# Patient Record
Sex: FEMALE | Race: WHITE | NOT HISPANIC OR LATINO | Employment: FULL TIME | URBAN - METROPOLITAN AREA
[De-identification: names, ages, dates, MRNs, and addresses within clinical notes are randomized per-mention and may not be internally consistent; named-entity substitution may affect disease eponyms.]

---

## 2016-10-27 LAB
ABO + RH BLD: NORMAL
ABO + RH BLD: NORMAL
BLD GP AB SCN SERPL QL: NEGATIVE
C TRACH DNA SPEC QL PROBE+SIG AMP: NEGATIVE
CULTURE MICRO: NO GROWTH
HBV SURFACE AG SERPL QL IA: NORMAL
HCT VFR BLD AUTO: 38.1 %
HEMOGLOBIN: 13 G/DL (ref 11.7–15.7)
HIV 1+2 AB+HIV1 P24 AG SERPL QL IA: NORMAL
N GONORRHOEA DNA SPEC QL PROBE+SIG AMP: NEGATIVE
PAP: NORMAL
PLATELET # BLD AUTO: 349 10^9/L
RUBELLA ANTIBODY IGG QUANTITATIVE: NORMAL IU/ML
T PALLIDUM IGG SER QL: NEGATIVE

## 2017-03-06 ENCOUNTER — PRENATAL OFFICE VISIT (OUTPATIENT)
Dept: OBGYN | Facility: CLINIC | Age: 27
End: 2017-03-06
Payer: COMMERCIAL

## 2017-03-06 ENCOUNTER — TELEPHONE (OUTPATIENT)
Dept: OBGYN | Facility: CLINIC | Age: 27
End: 2017-03-06

## 2017-03-06 VITALS
SYSTOLIC BLOOD PRESSURE: 116 MMHG | BODY MASS INDEX: 28.29 KG/M2 | HEIGHT: 69 IN | WEIGHT: 191 LBS | OXYGEN SATURATION: 98 % | HEART RATE: 100 BPM | DIASTOLIC BLOOD PRESSURE: 74 MMHG

## 2017-03-06 DIAGNOSIS — Z53.9 ERRONEOUS ENCOUNTER--DISREGARD: Primary | ICD-10-CM

## 2017-03-06 DIAGNOSIS — Z23 NEED FOR TDAP VACCINATION: ICD-10-CM

## 2017-03-06 DIAGNOSIS — Z34.90 SUPERVISION OF NORMAL PREGNANCY: Primary | ICD-10-CM

## 2017-03-06 LAB — GLUCOSE 1H P 50 G GLC PO SERPL-MCNC: 93 MG/DL (ref 60–129)

## 2017-03-06 PROCEDURE — 90471 IMMUNIZATION ADMIN: CPT | Performed by: OBSTETRICS & GYNECOLOGY

## 2017-03-06 PROCEDURE — 82950 GLUCOSE TEST: CPT | Performed by: OBSTETRICS & GYNECOLOGY

## 2017-03-06 PROCEDURE — 90715 TDAP VACCINE 7 YRS/> IM: CPT | Performed by: OBSTETRICS & GYNECOLOGY

## 2017-03-06 PROCEDURE — 99207 ZZC FIRST OB VISIT: CPT | Performed by: OBSTETRICS & GYNECOLOGY

## 2017-03-06 PROCEDURE — 36415 COLL VENOUS BLD VENIPUNCTURE: CPT | Performed by: OBSTETRICS & GYNECOLOGY

## 2017-03-06 RX ORDER — PRENATAL VIT/IRON FUM/FOLIC AC 27MG-0.8MG
1 TABLET ORAL DAILY
COMMUNITY
End: 2024-03-20

## 2017-03-06 NOTE — MR AVS SNAPSHOT
"              After Visit Summary   3/6/2017    Sophie Felton    MRN: [id number]           Patient Information     Date Of Birth          1990        Visit Information        Provider Department      3/6/2017 8:15 AM Gloria Wills MD [facility]        Today's Diagnoses     Supervision of normal pregnancy    -  1    Need for Tdap vaccination           Follow-ups after your visit        Who to contact     If you have questions or need follow up information about today's clinic visit or your schedule please contact [facility] directly at [phone].  Normal or non-critical lab and imaging results will be communicated to you by Wander [employer]hart, letter or phone within 4 business days after the clinic has received the results. If you do not hear from us within 7 days, please contact the clinic through Wander [employer]ongo[employer]hart or phone. If you have a critical or abnormal lab result, we will notify you by phone as soon as possible.  Submit refill requests through [employer] or call your pharmacy and they will forward the refill request to us. Please allow 3 business days for your refill to be completed.          Additional Information About Your Visit        MyChart Information     [employer] lets you send messages to your doctor, view your test results, renew your prescriptions, schedule appointments and more. To sign up, go to [email or web address] . Click on \"Log in\" on the left side of the screen, which will take you to the Welcome page. Then click on \"Sign up Now\" on the right side of the page.     You will be asked to enter the access code listed below, as well as some personal information. Please follow the directions to create your username and password.     Your access code is: [email or web address]  Expires: 2017  9:11 AM     Your access code will  in 90 days. If you need help or a new code, please call your [facility] or [phone].        Care EveryWhere ID     This is your Care EveryWhere " "ID. This could be used by other organizations to access your [place] medical records  [id number]        Your Vitals Were     Pulse Height Last Period Pulse Oximetry Breastfeeding? BMI (Body Mass Index)    100 5' 9\" (1.753 m) 08/24/2016 98% No 28.21 kg/m2       Blood Pressure from Last 3 Encounters:   03/06/17 116/74    Weight from Last 3 Encounters:   03/06/17 191 lb (86.6 kg)              We Performed the Following     ABO and Rh     Anti Treponema     CBC with platelets     Chlamydia trachomatis PCR     Conventional pap smear, diagnostic     Glucose tolerance gest screen 1 hour     Hepatitis B surface antigen     HIV Antigen Antibody Combo     INJECTION INTRAMUSCULAR OR SUB-Q     Neisseria gonorrhoeae PCR     OB hemoglobin     Rubella Antibody IgG Quantitative     TDAP (ADACEL AGES 11-64)     Urine Culture Aerobic Bacterial        Primary Care Provider    None Specified       No primary provider on file.        Thank you!     Thank you for choosing [facility]  for your care. Our goal is always to provide you with excellent care. Hearing back from our patients is one way we can continue to improve our services. Please take a few minutes to complete the written survey that you may receive in the mail after your visit with us. Thank you!             Your Updated Medication List - Protect others around you: Learn how to safely use, store and throw away your medicines at www.disposemymeds.org.          This list is accurate as of: 3/6/17  9:11 AM.  Always use your most recent med list.                   Brand Name Dispense Instructions for use    COLACE PO          prenatal multivitamin  plus iron 27-0.8 MG Tabs per tablet      Take 1 tablet by mouth daily         "

## 2017-03-06 NOTE — PROGRESS NOTES
SUBJECTIVE: Sophie Felton is an 27 year old female  here for initial OB visit with us.  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  Her pregnancy has been uncomplicated to date.  History of , SOL at 41 weeks, uncomplicated.  Then had SAB, periods resumed more widely spaced.  Pregnancy is dated by a 7w2d ultrasound which was 6 days discrepant with LMP NISHANT.  Had reassuring survey ultrasound.  Was seen at 24 weeks at [facility], had decreased fetal activity, baby has been active since then.  Considering delivery at [place], discussed, she will transfer to a  group that delivers there if this is her choice.  Also discussed CNM service here.  Tdap given today.        LMP was Patient's last menstrual period was 2016.  NISHANT is Estimated Date of Delivery: 2017, and she is approximately 26w6d  weeks today.     She reports no current significant problems.     EXAM: Blood pressure 116/74, pulse 100, weight 191 lb (86.6 kg), last menstrual period 2016, SpO2 98 %, not currently breastfeeding.  General appearance revealed a healthy, well-nourished woman in no distress.  Abdomen is soft, nontender without hernia or hepatosplenomegaly. There is no abnormal inguinal nor axillary adenopathy. Skin was normal.  Extremeties were without edema.  Neuro/psych exam revealed appropriate mood and affect.      PELVIC EXAM:  Deferred.     ASSESSMENT: Healthy , 26w6d.   PLAN:  1.  Follow up in 4 weeks  2.  Await one hour glucose.

## 2017-04-05 ENCOUNTER — PRENATAL OFFICE VISIT (OUTPATIENT)
Dept: OBGYN | Facility: CLINIC | Age: 27
End: 2017-04-05
Payer: COMMERCIAL

## 2017-04-05 VITALS
OXYGEN SATURATION: 95 % | WEIGHT: 193 LBS | BODY MASS INDEX: 28.5 KG/M2 | DIASTOLIC BLOOD PRESSURE: 67 MMHG | HEART RATE: 80 BPM | SYSTOLIC BLOOD PRESSURE: 104 MMHG

## 2017-04-05 DIAGNOSIS — Z34.80 SUPERVISION OF OTHER NORMAL PREGNANCY, ANTEPARTUM: Primary | ICD-10-CM

## 2017-04-05 PROCEDURE — 99207 ZZC PRENATAL VISIT: CPT | Performed by: OBSTETRICS & GYNECOLOGY

## 2017-04-05 NOTE — MR AVS SNAPSHOT
"              After Visit Summary   4/5/2017    Sophie Felton    MRN: [id number]           Patient Information     Date Of Birth          1990        Visit Information        Provider Department      4/5/2017 9:00 AM Gloria Wills MD [facility]        Today's Diagnoses     Supervision of other normal pregnancy, antepartum    -  1       Follow-ups after your visit        Your next 10 appointments already scheduled     Apr 18, 2017 12:45 PM CDT   ESTABLISHED PRENATAL with CHRISTOPHER Maldonado CNZORA   [facility] ([facility])    [address] [place] [address] [phone]              Who to contact     If you have questions or need follow up information about today's clinic visit or your schedule please contact [facility] directly at [phone].  Normal or non-critical lab and imaging results will be communicated to you by MyChart, letter or phone within 4 business days after the clinic has received the results. If you do not hear from us within 7 days, please contact the clinic through MyChart or phone. If you have a critical or abnormal lab result, we will notify you by phone as soon as possible.  Submit refill requests through Druidly or call your pharmacy and they will forward the refill request to us. Please allow 3 business days for your refill to be completed.          Additional Information About Your Visit        MyChart Information     Druidly lets you send messages to your doctor, view your test results, renew your prescriptions, schedule appointments and more. To sign up, go to [email or web address] . Click on \"Log in\" on the left side of the screen, which will take you to the Welcome page. Then click on \"Sign up Now\" on the right side of the page.     You will be asked to enter the access code listed below, as well as some personal information. Please follow the directions to create your " username and password.     Your access code is: [email or web address]  Expires: 2017 10:11 AM     Your access code will  in 90 days. If you need help or a new code, please call your [facility] or [phone].        Care EveryWhere ID     This is your Care EveryWhere ID. This could be used by other organizations to access your [place] medical records  [id number]        Your Vitals Were     Pulse Last Period Pulse Oximetry Breastfeeding? BMI (Body Mass Index)       80 2016 95% No 28.5 kg/m2        Blood Pressure from Last 3 Encounters:   17 104/67   17 116/74    Weight from Last 3 Encounters:   17 193 lb (87.5 kg)   17 191 lb (86.6 kg)              Today, you had the following     No orders found for display       Primary Care Provider    None Specified       No primary provider on file.        Thank you!     Thank you for choosing [facility]  for your care. Our goal is always to provide you with excellent care. Hearing back from our patients is one way we can continue to improve our services. Please take a few minutes to complete the written survey that you may receive in the mail after your visit with us. Thank you!             Your Updated Medication List - Protect others around you: Learn how to safely use, store and throw away your medicines at www.disposemymeds.org.          This list is accurate as of: 17  9:21 AM.  Always use your most recent med list.                   Brand Name Dispense Instructions for use    COLACE PO          prenatal multivitamin  plus iron 27-0.8 MG Tabs per tablet      Take 1 tablet by mouth daily

## 2017-04-05 NOTE — PROGRESS NOTES
Back pain, right sciatic pain--discussed, she will call if she wishes PT referral.  One hour glucose normal.  Probably will make next appt with CNM service, plans to stay with delivery at [place].  RTC 2 weeks.  AM

## 2017-04-18 ENCOUNTER — PRENATAL OFFICE VISIT (OUTPATIENT)
Dept: MIDWIFE SERVICES | Facility: CLINIC | Age: 27
End: 2017-04-18
Payer: COMMERCIAL

## 2017-04-18 VITALS
HEART RATE: 89 BPM | OXYGEN SATURATION: 96 % | WEIGHT: 198 LBS | SYSTOLIC BLOOD PRESSURE: 121 MMHG | DIASTOLIC BLOOD PRESSURE: 79 MMHG | BODY MASS INDEX: 29.24 KG/M2

## 2017-04-18 DIAGNOSIS — Z34.83 OTHER NORMAL PREGNANCY, NOT FIRST, THIRD TRIMESTER: Primary | ICD-10-CM

## 2017-04-18 PROCEDURE — 99207 ZZC PRENATAL VISIT: CPT | Performed by: ADVANCED PRACTICE MIDWIFE

## 2017-04-18 NOTE — MR AVS SNAPSHOT
"              After Visit Summary   4/18/2017    Sophie Felton    MRN: [id number]           Patient Information     Date Of Birth          1990        Visit Information        Provider Department      4/18/2017 12:45 PM [facility]        Today's Diagnoses     Other normal pregnancy, not first, third trimester    -  1       Follow-ups after your visit        Your next 10 appointments already scheduled     May 04, 2017  9:30 AM CDT   ESTABLISHED PRENATAL with Lizette Garrett CNM   [facility] ([facility]    [address] [place] [address] [phone]              Who to contact     If you have questions or need follow up information about today's clinic visit or your schedule please contact [facility] directly at [phone].  Normal or non-critical lab and imaging results will be communicated to you by MyChart, letter or phone within 4 business days after the clinic has received the results. If you do not hear from us within 7 days, please contact the clinic through MyChart or phone. If you have a critical or abnormal lab result, we will notify you by phone as soon as possible.  Submit refill requests through [employer] or call your pharmacy and they will forward the refill request to us. Please allow 3 business days for your refill to be completed.          Additional Information About Your Visit        MyChart Information     [employer] lets you send messages to your doctor, view your test results, renew your prescriptions, schedule appointments and more. To sign up, go to [email or web address] . Click on \"Log in\" on the left side of the screen, which will take you to the Welcome page. Then click on \"Sign up Now\" on the right side of the page.     You will be asked to enter the access code listed below, as well as some personal information. Please follow the directions to " create your username and password.     Your access code is: [email or web address]  Expires: 2017 10:11 AM     Your access code will  in 90 days. If you need help or a new code, please call your [facility] or [phone].        Care EveryWhere ID     This is your Care EveryWhere ID. This could be used by other organizations to access your [place] medical records  [id number]        Your Vitals Were     Pulse Last Period Pulse Oximetry Breastfeeding? BMI (Body Mass Index)       89 2016 96% No 29.24 kg/m2        Blood Pressure from Last 3 Encounters:   17 121/79   17 104/67   17 116/74    Weight from Last 3 Encounters:   17 198 lb (89.8 kg)   17 193 lb (87.5 kg)   17 191 lb (86.6 kg)              Today, you had the following     No orders found for display       Primary Care Provider    None Specified       No primary provider on file.        Thank you!     Thank you for choosing [facility]  for your care. Our goal is always to provide you with excellent care. Hearing back from our patients is one way we can continue to improve our services. Please take a few minutes to complete the written survey that you may receive in the mail after your visit with us. Thank you!             Your Updated Medication List - Protect others around you: Learn how to safely use, store and throw away your medicines at www.disposemymeds.org.          This list is accurate as of: 17  2:02 PM.  Always use your most recent med list.                   Brand Name Dispense Instructions for use    COLACE PO          prenatal multivitamin  plus iron 27-0.8 MG Tabs per tablet      Take 1 tablet by mouth daily

## 2017-04-18 NOTE — PROGRESS NOTES
33w0d  INO to [facility] care. Had unmedicated first birth at [place]. Had a . This time she is planning to not have a  but wants the support of midwifery and also the possibility of a waterbirth if things go well. She is uncertain if she wants to have another unmedicated birth and may opt for nitrous or fentanyl this time. She works as a nurse in [employer]. Reports good fetal movement. Denies leaking of fluid, vaginal bleeding, regular uterine contractions, headache or other concerns.    RTC in 2-3 wks . Discussed gbs, hgb, and hep c. EVELYNE

## 2017-05-04 ENCOUNTER — PRENATAL OFFICE VISIT (OUTPATIENT)
Dept: MIDWIFE SERVICES | Facility: CLINIC | Age: 27
End: 2017-05-04
Payer: COMMERCIAL

## 2017-05-04 VITALS
WEIGHT: 200.6 LBS | SYSTOLIC BLOOD PRESSURE: 116 MMHG | TEMPERATURE: 98 F | BODY MASS INDEX: 29.62 KG/M2 | HEART RATE: 98 BPM | OXYGEN SATURATION: 98 % | DIASTOLIC BLOOD PRESSURE: 68 MMHG

## 2017-05-04 DIAGNOSIS — Z34.80 SUPERVISION OF OTHER NORMAL PREGNANCY, ANTEPARTUM: Primary | ICD-10-CM

## 2017-05-04 PROCEDURE — 99207 ZZC PRENATAL VISIT: CPT | Performed by: ADVANCED PRACTICE MIDWIFE

## 2017-05-04 NOTE — PROGRESS NOTES
Feeling well.  Baby is active. Denies any leaking of fluid, vaginal bleeding, regular uterine contractions, or headaches or other concerns.  Discussed labs, including hep C, at 36 weeks, then weekly visits.  She wanted to take waterbirth consent home to review.  Needs to sign.  Reviewed to call [phone] for contractions, loss of fluid, vaginal bleeding, decreased fetal movement or any other questions or concerns.    RTC in 1 weeks.  Lizette Garrett, ESTEFANIA, APRN, CNM

## 2017-05-04 NOTE — MR AVS SNAPSHOT
After Visit Summary   5/4/2017    Sophie Felton    MRN: [id number]           Patient Information     Date Of Birth          1990        Visit Information        Provider Department      5/4/2017 9:30 AM Lizette Garrett CNM [facility]        Today's Diagnoses     Supervision of other normal pregnancy, antepartum    -  1       Follow-ups after your visit        Follow-up notes from your care team     Return in about 1 week (around 5/11/2017) for Prenatal care with ERICKA.      Your next 10 appointments already scheduled     May 10, 2017 10:00 AM CDT   ESTABLISHED PRENATAL with CHRISTOPHER Sweet CNM   [facility] ([facility])    [address] [place] [address] [address]  [facility] [id number] [phone]            May 16, 2017  9:30 AM CDT   ESTABLISHED PRENATAL with CHRISTOPHER Cummings CNM   [facility] ([facility])    [address] [place] [address]  [address]  [facility] [id number] [phone]            May 24, 2017 10:15 AM CDT   ESTABLISHED PRENATAL with CHRISTOPHER Monte CNM   [facility] ([facility]    [address] [place] [address]  [address]  [facility] [id number] [phone]            May 30, 2017  9:30 AM CDT   ESTABLISHED PRENATAL with CHRISTOPHER Kothari CNM   [facility] ([facility]    [address] [place] [address]  [address]  [facility] [address] [phone]            Jun 06, 2017  9:00 AM CDT   ESTABLISHED PRENATAL with CHRISTOPHER Stein CNM   [facility] ([facility])    [address] [place] [address]  [address]  [facility] [address] [phone]              Who to contact     If you have questions or need follow up information about today's clinic visit or your schedule please contact [facility] directly at [phone].  Normal or non-critical lab and imaging results  will be communicated to you by MyChart, letter or phone within 4 business days after the clinic has received the results. If you do not hear from us within 7 days, please contact the clinic through MyChart or phone. If you have a critical or abnormal lab result, we will notify you by phone as soon as possible.  Submit refill requests through Zak[employer] or call your pharmacy and they will forward the refill request to us. Please allow 3 business days for your refill to be completed.          Additional Information About Your Visit        Care EveryWhere ID     This is your Care EveryWhere ID. This could be used by other organizations to access your [place] medical records  [id number]        Your Vitals Were     Pulse Temperature Last Period Pulse Oximetry BMI (Body Mass Index)       98 98  F (36.7  C) (Oral) 08/24/2016 98% 29.62 kg/m2        Blood Pressure from Last 3 Encounters:   05/04/17 116/68   04/18/17 121/79   04/05/17 104/67    Weight from Last 3 Encounters:   05/04/17 200 lb 9.6 oz (91 kg)   04/18/17 198 lb (89.8 kg)   04/05/17 193 lb (87.5 kg)              Today, you had the following     No orders found for display       Primary Care Provider    None Specified       No primary provider on file.        Thank you!     Thank you for choosing [facility]  for your care. Our goal is always to provide you with excellent care. Hearing back from our patients is one way we can continue to improve our services. Please take a few minutes to complete the written survey that you may receive in the mail after your visit with us. Thank you!             Your Updated Medication List - Protect others around you: Learn how to safely use, store and throw away your medicines at www.disposemymeds.org.          This list is accurate as of: 5/4/17 10:23 AM.  Always use your most recent med list.                   Brand Name Dispense Instructions for use    COLACE PO          prenatal multivitamin  plus iron 27-0.8  MG Tabs per tablet      Take 1 tablet by mouth daily

## 2017-05-10 ENCOUNTER — PRENATAL OFFICE VISIT (OUTPATIENT)
Dept: MIDWIFE SERVICES | Facility: CLINIC | Age: 27
End: 2017-05-10
Payer: COMMERCIAL

## 2017-05-10 VITALS
SYSTOLIC BLOOD PRESSURE: 109 MMHG | WEIGHT: 200.3 LBS | DIASTOLIC BLOOD PRESSURE: 73 MMHG | HEART RATE: 87 BPM | TEMPERATURE: 97.7 F | OXYGEN SATURATION: 98 % | BODY MASS INDEX: 29.58 KG/M2

## 2017-05-10 DIAGNOSIS — Z34.80 SUPERVISION OF OTHER NORMAL PREGNANCY, ANTEPARTUM: Primary | ICD-10-CM

## 2017-05-10 LAB — HGB BLD-MCNC: 12.6 G/DL (ref 11.7–15.7)

## 2017-05-10 PROCEDURE — 36415 COLL VENOUS BLD VENIPUNCTURE: CPT | Performed by: ADVANCED PRACTICE MIDWIFE

## 2017-05-10 PROCEDURE — 00000218 ZZHCL STATISTIC OBHBG - HEMOGLOBIN: Performed by: ADVANCED PRACTICE MIDWIFE

## 2017-05-10 PROCEDURE — 99207 ZZC PRENATAL VISIT: CPT | Performed by: ADVANCED PRACTICE MIDWIFE

## 2017-05-10 PROCEDURE — 86803 HEPATITIS C AB TEST: CPT | Performed by: ADVANCED PRACTICE MIDWIFE

## 2017-05-10 PROCEDURE — 87653 STREP B DNA AMP PROBE: CPT | Performed by: ADVANCED PRACTICE MIDWIFE

## 2017-05-10 NOTE — MR AVS SNAPSHOT
After Visit Summary   5/10/2017    Sophie Felton    MRN: [id number]           Patient Information     Date Of Birth          1990        Visit Information        Provider Department      5/10/2017 10:00 AM Romana Lance APRN CNM [facility]        Today's Diagnoses     Supervision of other normal pregnancy, antepartum    -  1       Follow-ups after your visit        Your next 10 appointments already scheduled     May 16, 2017  9:30 AM CDT   ESTABLISHED PRENATAL with CHRISTOPHER Cummings CNM   [facility]    [address] [place] [id number]   [phone]            May 24, 2017 10:15 AM CDT   ESTABLISHED PRENATAL with CHRISTOPHER Monte CNM   [facility] ([facility])    [address] [place] [address] [phone]            May 30, 2017  9:30 AM CDT   ESTABLISHED PRENATAL with CHRISTOPHER Kothari CNM   [facility] ([facility]    [address] [place] [address] [phone]            Jun 06, 2017  9:00 AM CDT   ESTABLISHED PRENATAL with CHRISTOPHER Stein CNM   [facility] ([facility]    [address] [place] [id number] [phone]              Who to contact     If you have questions or need follow up information about today's clinic visit or your schedule please contact [facility] directly at [phone].  Normal or non-critical lab and imaging results will be communicated to you by MyChart, letter or phone within 4 business days after the clinic has received the results. If you do not hear from us within 7 days, please contact the clinic through MyChart or phone. If you have a critical or abnormal lab result, we will notify you by phone as soon as possible.  Submit refill requests  through [employer] or call your pharmacy and they will forward the refill request to us. Please allow 3 business days for your refill to be completed.          Additional Information About Your Visit        Care EveryWhere ID     This is your Care EveryWhere ID. This could be used by other organizations to access your [place] medical records  [id number]        Your Vitals Were     Pulse Temperature Last Period Pulse Oximetry BMI (Body Mass Index)       87 97.7  F (36.5  C) (Oral) 08/24/2016 98% 29.58 kg/m2        Blood Pressure from Last 3 Encounters:   05/10/17 109/73   05/04/17 116/68   04/18/17 121/79    Weight from Last 3 Encounters:   05/10/17 200 lb 4.8 oz (90.9 kg)   05/04/17 200 lb 9.6 oz (91 kg)   04/18/17 198 lb (89.8 kg)              We Performed the Following     Group B strep PCR     Hepatitis C antibody     OB hemoglobin        Primary Care Provider    None Specified       No primary provider on file.        Thank you!     Thank you for choosing [facility]  for your care. Our goal is always to provide you with excellent care. Hearing back from our patients is one way we can continue to improve our services. Please take a few minutes to complete the written survey that you may receive in the mail after your visit with us. Thank you!             Your Updated Medication List - Protect others around you: Learn how to safely use, store and throw away your medicines at www.disposemymeds.org.          This list is accurate as of: 5/10/17 10:31 AM.  Always use your most recent med list.                   Brand Name Dispense Instructions for use    COLACE PO          prenatal multivitamin  plus iron 27-0.8 MG Tabs per tablet      Take 1 tablet by mouth daily

## 2017-05-11 LAB
GP B STREP DNA SPEC QL NAA+PROBE: NORMAL
HCV AB SERPL QL IA: NORMAL
SPECIMEN SOURCE: NORMAL

## 2017-05-11 ASSESSMENT — PATIENT HEALTH QUESTIONNAIRE - PHQ9: SUM OF ALL RESPONSES TO PHQ QUESTIONS 1-9: 0

## 2017-05-16 ENCOUNTER — PRENATAL OFFICE VISIT (OUTPATIENT)
Dept: MIDWIFE SERVICES | Facility: CLINIC | Age: 27
End: 2017-05-16
Payer: COMMERCIAL

## 2017-05-16 VITALS
SYSTOLIC BLOOD PRESSURE: 103 MMHG | OXYGEN SATURATION: 97 % | WEIGHT: 202 LBS | HEART RATE: 100 BPM | BODY MASS INDEX: 29.83 KG/M2 | DIASTOLIC BLOOD PRESSURE: 72 MMHG

## 2017-05-16 DIAGNOSIS — O26.893 HEARTBURN DURING PREGNANCY IN THIRD TRIMESTER: ICD-10-CM

## 2017-05-16 DIAGNOSIS — Z34.80 SUPERVISION OF OTHER NORMAL PREGNANCY, ANTEPARTUM: Primary | ICD-10-CM

## 2017-05-16 DIAGNOSIS — R12 HEARTBURN DURING PREGNANCY IN THIRD TRIMESTER: ICD-10-CM

## 2017-05-16 PROCEDURE — 99207 ZZC PRENATAL VISIT: CPT | Performed by: NURSE PRACTITIONER

## 2017-05-16 NOTE — PATIENT INSTRUCTIONS
HEARTBURN PREVENTION    Heartburn can be incredibly uncomfortable and bothersome. It can worsen with pregnancy, certain medications, and other illnesses. Try the lifestyle tips and remedies to combat your symptoms. We encourage you to try these first before using medications.      Small frequent meals      Eat slowly       Separate eating solid foods from liquids by 30-60 minutes      Do not eat within 2 hour of going to sleep      Avoid going to sleep right after eating or prop yourself up on pillows to avoid being completely flat      Avoid acidic foods like tomatoes, spicy foods, caffeine, chocolate, peppermint, alcohol, sugar, white bread rice, and pasta (whole grains are easier to digest)      Chew gum or have a throat lozenge before eating to encourage salivation, saliva neutralizes stomach acid       Avoid tobacco and alcohol      Weight loss may also be helpful if you are overweight      NATURAL REMEDIES FOR HEARTBURN  You should avoid overuse of standard over the counter (OTC) antacids since they neutralize hydrochloric acid and arrest digestion.  Neutralized stomach acids later rebound with an increased acid secretion after the antacid wears off, which in turn produces even more stomach acid.  Prolonged use of antacids can lead to diarrhea and constipation with nausea.  Additionally, brucellae, a beneficial bacteria found in some dairy products, is killed by antacids.  It is better to try these natural remedies first.       Papaya tablets, papaya or papaya leaf tea      Fennel seeds, cumin seeds, anise seeds, and dill seed, can be eaten alone or made into tea      Essential oils to massage into the upper abdomen chamomile, fennel, lemon balm, nini sandalwood                                                      Sip a small amount of apple cider vinegar with warm water      Raw almonds      Drink a glass of milk before bed      Supplement with digestive enzymes (can be found in most drug/health food)  "stores    Plain baked potatoes    Comfrey-pepsin tablets (digestive aid)    Eat raw apple peels  Medications    If natural remedies and lifestyle changes fail consider using OTC medications. Many OTC medications are available for heartburn and are often very effective.      Antacids like Tums, Mylanta, Maalox     Zantac/Pepcid    Prilosec     Most OTC medications are recommended for short term use only, if you continue to have issues despite all of these helpful methods we recommend you be seen by a primary care provider for evaluation.    For questions or concerns please call:    Labor Instructions for Midwife Patients    When to call:  Both during and after office hours call [phone]. There is a triage RN to take your calls and answer your questions 24 hours a day.  If she cannot answer your question she will page the midwife on call for you.    When to call:  Call anytime you have important concerns about you or your baby.     Call if:    You are having contractions at regular intervals about 5-6 minutes apart lasting 30-60 seconds and becoming increasingly more intense     You have an uncontrollable gush of fluid from your vagina or feel a pop and gush like your water has broken    You have HEAVY bleeding, like heavy period, blood running down your legs, or  soaking a pad.     Some bleeding after a pelvic exam, after intercourse, or in labor when your cervix is dilating is normal and is referred to as \"bloody show\"    You have severe, continuous back or abdominal pain    You feel it is time to go to the hospital    If this is your first labor, call when contractions are very intense and have been about every 3-4 minutes for about an hour    If it is your second labor or more, call when contractions are strong and about every 3-5 minutes or sooner depending on your level of discomfort.     Keep in mind we are always here for you! If you have questions, concerns please don't hesitate to call us.     What to " "eat/drink in labor: Drink plenty of fluid (water most importantly, juice, soda or tea without caffeine). Eat rice, pasta, soup, cereal, bread/toast, and fruit. Avoid dairy and greasy food as they are difficult to digest and you may experience some nausea during labor.    Comfort measures:    Baths and showers (ok even with ruptured membranes, it may temporarily slow contractions if you are still in the early stage of labor)    Warm/hot packs for back pain or discomfort    Back, belly, or thigh massages    Standing, rocking, walking, leaning over bed or tables, side-lying and sleeping    Miscellaneous:     Contractions are timed from the beginning of one to the beginning of the next    Try hard to sleep during the early stage of labor when you are not that uncomfortable. Timing of contractions at this point is not important    Even if you cannot sleep, resting in bed or on the couch can help you maintain your energy for labor    When you arrive at the hospital the nurse will check your baby's heartbeat, check your cervix, and will call us. The midwife on call will come in and be with you when you are in active labor    After hours you need to enter the hospital through the emergency room    PREECLAMPSIA SIGNS AND SYMPTOMS    Preeclampsia is a dangerous condition that some women develop in the second half of pregnancy. It can also begin after the baby is born.  Preeclampsia causes high blood pressure and can cause problems with many organ systems in your body.  It can also affect the growth of your baby. The exact cause of preeclampsia is unknown, however, there are signs and symptoms to watch for:    -A bad headache that doesn't improve with Tylenol  -Visual changes such as spots, flashes of light, blurry vision  -Pain in the upper right part of your abdomen, especially under the ribs that doesn't go away  -Nausea and/or vomiting  -Feeling extremely tired  -Yellowing of the skin and/or eyes  -Feeling \"not quite " "right\" or that something is wrong  -An extreme amount of swelling (some swelling in pregnancy is very normal)    If your midwife feels that you are developing preeclampsia, you will have lab tests drawn and will be monitored very closely.     If you are experiencing anyof these symptoms, call  "

## 2017-05-16 NOTE — PROGRESS NOTES
States that she has been having daily heartburn; states that she is getting some relief with Tums.    Fetal movement: positive  Denies vaginal bleeding/lof, some contractions that resolve on own with rest  Warning signs reviewed  Labor signs and symptoms discussed, aware of numbers to call  Denies s/sx of preeclampsia and aware of what to report  Counseled on using Zantac OTC 150mg 1-2x day for heartburn  Plans for birth control after baby: barrier methods  Return to clinic 1 week OB visit    Comfort WHITE CNM

## 2017-05-16 NOTE — MR AVS SNAPSHOT
After Visit Summary   5/16/2017    Sophie Felton    MRN: [id number]           Patient Information     Date Of Birth          1990        Visit Information        Provider Department      5/16/2017 9:30 AM [facility]        Today's Diagnoses     Supervision of other normal pregnancy, antepartum    -  1    Heartburn during pregnancy in third trimester          Care Instructions         HEARTBURN PREVENTION    Heartburn can be incredibly uncomfortable and bothersome. It can worsen with pregnancy, certain medications, and other illnesses. Try the lifestyle tips and remedies to combat your symptoms. We encourage you to try these first before using medications.      Small frequent meals      Eat slowly       Separate eating solid foods from liquids by 30-60 minutes      Do not eat within 2 hour of going to sleep      Avoid going to sleep right after eating or prop yourself up on pillows to avoid being completely flat      Avoid acidic foods like tomatoes, spicy foods, caffeine, chocolate, peppermint, alcohol, sugar, white bread rice, and pasta (whole grains are easier to digest)      Chew gum or have a throat lozenge before eating to encourage salivation, saliva neutralizes stomach acid       Avoid tobacco and alcohol      Weight loss may also be helpful if you are overweight      NATURAL REMEDIES FOR HEARTBURN  You should avoid overuse of standard over the counter (OTC) antacids since they neutralize hydrochloric acid and arrest digestion.  Neutralized stomach acids later rebound with an increased acid secretion after the antacid wears off, which in turn produces even more stomach acid.  Prolonged use of antacids can lead to diarrhea and constipation with nausea.  Additionally, brucellae, a beneficial bacteria found in some dairy products, is killed by antacids.  It is better to try these natural remedies first.       Papaya tablets, papaya or papaya  "leaf tea      Fennel seeds, cumin seeds, anise seeds, and dill seed, can be eaten alone or made into tea      Essential oils to massage into the upper abdomen chamomile, fennel, lemon balm, nini sandalwood                                                      Sip a small amount of apple cider vinegar with warm water      Raw almonds      Drink a glass of milk before bed      Supplement with digestive enzymes (can be found in most drug/health food) stores    Plain baked potatoes    Comfrey-pepsin tablets (digestive aid)    Eat raw apple peels  Medications    If natural remedies and lifestyle changes fail consider using OTC medications. Many OTC medications are available for heartburn and are often very effective.      Antacids like Tums, Mylanta, Maalox     Zantac/Pepcid    Prilosec     Most OTC medications are recommended for short term use only, if you continue to have issues despite all of these helpful methods we recommend you be seen by a primary care provider for evaluation.    For questions or concerns please call:    Labor Instructions for Midwife Patients    When to call:  Both during and after office hours call [phone]. There is a triage RN to take your calls and answer your questions 24 hours a day.  If she cannot answer your question she will page the midwife on call for you.    When to call:  Call anytime you have important concerns about you or your baby.     Call if:    You are having contractions at regular intervals about 5-6 minutes apart lasting 30-60 seconds and becoming increasingly more intense     You have an uncontrollable gush of fluid from your vagina or feel a pop and gush like your water has broken    You have HEAVY bleeding, like heavy period, blood running down your legs, or  soaking a pad.     Some bleeding after a pelvic exam, after intercourse, or in labor when your cervix is dilating is normal and is referred to as \"bloody show\"    You have severe, continuous back or abdominal " pain    You feel it is time to go to the hospital    If this is your first labor, call when contractions are very intense and have been about every 3-4 minutes for about an hour    If it is your second labor or more, call when contractions are strong and about every 3-5 minutes or sooner depending on your level of discomfort.     Keep in mind we are always here for you! If you have questions, concerns please don't hesitate to call us.     What to eat/drink in labor: Drink plenty of fluid (water most importantly, juice, soda or tea without caffeine). Eat rice, pasta, soup, cereal, bread/toast, and fruit. Avoid dairy and greasy food as they are difficult to digest and you may experience some nausea during labor.    Comfort measures:    Baths and showers (ok even with ruptured membranes, it may temporarily slow contractions if you are still in the early stage of labor)    Warm/hot packs for back pain or discomfort    Back, belly, or thigh massages    Standing, rocking, walking, leaning over bed or tables, side-lying and sleeping    Miscellaneous:     Contractions are timed from the beginning of one to the beginning of the next    Try hard to sleep during the early stage of labor when you are not that uncomfortable. Timing of contractions at this point is not important    Even if you cannot sleep, resting in bed or on the couch can help you maintain your energy for labor    When you arrive at the hospital the nurse will check your baby's heartbeat, check your cervix, and will call us. The midwife on call will come in and be with you when you are in active labor    After hours you need to enter the hospital through the emergency room    PREECLAMPSIA SIGNS AND SYMPTOMS    Preeclampsia is a dangerous condition that some women develop in the second half of pregnancy. It can also begin after the baby is born.  Preeclampsia causes high blood pressure and can cause problems with many organ systems in your body.  It can also  "affect the growth of your baby. The exact cause of preeclampsia is unknown, however, there are signs and symptoms to watch for:    -A bad headache that doesn't improve with Tylenol  -Visual changes such as spots, flashes of light, blurry vision  -Pain in the upper right part of your abdomen, especially under the ribs that doesn't go away  -Nausea and/or vomiting  -Feeling extremely tired  -Yellowing of the skin and/or eyes  -Feeling \"not quite right\" or that something is wrong  -An extreme amount of swelling (some swelling in pregnancy is very normal)    If your midwife feels that you are developing preeclampsia, you will have lab tests drawn and will be monitored very closely.     If you are experiencing anyof these symptoms, call        Follow-ups after your visit        Follow-up notes from your care team     Return in about 1 week (around 5/23/2017) for Prenatal visit.      Your next 10 appointments already scheduled     May 24, 2017 10:15 AM CDT   [place] CHRISTOPHER Monte [place]   [facility] [place]    [address] [place] [id number] [phone]            May 30, 2017  9:30 AM CDT   ESTABLISHED PRENATAL with CHRISTOPHER Kothari CNM   [facility] [place]    [address] [place] [id number] [phone]            Jun 06, 2017  9:00 AM CDT   ESTABLISHED PRENATAL with CHRISTOPHER Stein CNM   [facility] [place]    [address] [place] [address] [id number] [phone]              Who to contact     If you have questions or need follow up information about today's clinic visit or your schedule please [place] [facility] at [phone].  Normal or non-critical lab and imaging results will be communicated to you by MyChart, letter or phone within 4 business days after the clinic " "has received the results. If you do not hear from us within 7 days, please contact the clinic through [employer] or phone. If you have a critical or abnormal lab result, we will notify you by phone as soon as possible.  Submit refill requests through [employer] or call your pharmacy and they will forward the refill request to us. Please allow 3 business days for your refill to be completed.          Additional Information About Your Visit        [employer] Information     [employer] lets you send messages to your doctor, view your test results, renew your prescriptions, schedule appointments and more. To sign up, go to [email or web address] . Click on \"Log in\" on the left side of the screen, which will take you to the Welcome page. Then click on \"Sign up Now\" on the right side of the page.     You will be asked to enter the access code listed below, as well as some personal information. Please follow the directions to create your username and password.     Your access code is: [email or web address]  Expires: 2017  9:30 AM     Your access code will  in 90 days. If you need help or a new code, please call your [facility] clinic or [phone].        Care EveryWhere ID     This is your Care EveryWhere ID. This could be used by other organizations to access your [facility] medical records  [id number]        Your Vitals Were     Pulse Last Period Pulse Oximetry Breastfeeding? BMI (Body Mass Index)       100 2016 97% No 29.83 kg/m2        Blood Pressure from Last 3 Encounters:   17 103/72   05/10/17 109/73   17 116/68    Weight from Last 3 Encounters:   17 202 lb (91.6 kg)   05/10/17 200 lb 4.8 oz (90.9 kg)   17 200 lb 9.6 oz (91 kg)              Today, you had the following     No orders found for display         Today's Medication Changes          These changes are accurate as of: 17 11:34 AM.  If you have any questions, ask your nurse or doctor.               Start taking these medicines.     "    Dose/Directions    ranitidine 150 MG tablet   Commonly known as:  ZANTAC   Used for:  Heartburn during pregnancy in third trimester   Started by:  Comfort Diaz APRN CNM        Dose:  150 mg   Take 1 tablet (150 mg) by mouth 2 times daily   Quantity:  60 tablet   Refills:  1            Where to get your medicines      Some of these will need a paper prescription and others can be bought over the counter.  Ask your nurse if you have questions.     You don't need a prescription for these medications     ranitidine 150 MG tablet                Primary Care Provider    None Specified       No primary provider on file.        Thank you!     [place] you [place] [facility]  [place]. Our goal is always to provide you with excellent care. Hearing back from our patients is one way we can continue to improve our services. Please take a few minutes to complete the written survey that you may receive in the mail after your visit with us. Thank you!             Your Updated Medication List - Protect others around you: Learn how to safely use, store and throw away your medicines at www.disposemymeds.org.          This list is accurate as of: 5/16/17 11:34 AM.  Always use your most recent med list.                   Brand Name Dispense Instructions for use    COLACE PO          prenatal multivitamin  plus iron 27-0.8 MG Tabs per tablet      Take 1 tablet by mouth daily       ranitidine 150 MG tablet    ZANTAC    60 tablet    Take 1 tablet (150 mg) by mouth 2 times daily

## 2017-05-24 ENCOUNTER — PRENATAL OFFICE VISIT (OUTPATIENT)
Dept: MIDWIFE SERVICES | Facility: CLINIC | Age: 27
End: 2017-05-24
Payer: COMMERCIAL

## 2017-05-24 VITALS — DIASTOLIC BLOOD PRESSURE: 70 MMHG | WEIGHT: 205.3 LBS | SYSTOLIC BLOOD PRESSURE: 124 MMHG | BODY MASS INDEX: 30.32 KG/M2

## 2017-05-24 DIAGNOSIS — Z34.80 SUPERVISION OF OTHER NORMAL PREGNANCY, ANTEPARTUM: Primary | ICD-10-CM

## 2017-05-24 PROCEDURE — 99207 ZZC PRENATAL VISIT: CPT | Performed by: ADVANCED PRACTICE MIDWIFE

## 2017-05-24 NOTE — MR AVS SNAPSHOT
"              After Visit Summary   5/24/2017    Sophie Felton    MRN: [id number]           Patient Information     Date Of Birth          1990        Visit Information        Provider Department      5/24/2017 10:15 AM Augusta Kim APRN CN[facility]        Today's Diagnoses     Supervision of other normal pregnancy, antepartum    -  1       Follow-ups after your visit        Your next 10 appointments already scheduled     Jun 06, 2017 11:00 AM CDT   ESTABLISHED PRENATAL with CHRISTOPHER Sweet CNM   [facility] [place]    [address] [place] [id number] [phone]              Who to contact     If you have questions or need follow up information about today's clinic visit or your schedule please contact [facility] [place] at [phone].  Normal or non-critical lab and imaging results will be communicated to you by MyChart, letter or phone within 4 business days after the clinic has received the results. If you do not hear from us within 7 days, please contact the clinic through MyChart or phone. If you have a critical or abnormal lab result, we will notify you by phone as soon as possible.  Submit refill requests through [employer] or call your pharmacy and they will forward the refill request to us. Please allow 3 business days for your refill to be completed.          Additional Information About Your Visit        MyChart Information     [employer] lets you send messages to your doctor, view your test results, renew your prescriptions, schedule appointments and more. To sign up, go to [email or web address] . Click on \"Log in\" on the left side of the screen, which will take you to the Welcome page. Then click on \"Sign up Now\" on the right side of the page.     You will be asked to enter the access code listed below, as well as some personal information. Please follow the directions to create " your username and password.     Your access code is: [email or web address]  Expires: 2017  9:30 AM     Your access code will  in 90 days. If you need help or a new code, please call your [facility] or [phone].        Care EveryWhere ID     This is your Care EveryWhere ID. This could be used by other organizations to access your [place] medical records  [id number]        Your Vitals Were     Last Period BMI (Body Mass Index)                2016 30.32 kg/m2           Blood Pressure from Last 3 Encounters:   17 124/70   17 103/72   05/10/17 109/73    Weight from Last 3 Encounters:   17 205 lb 4.8 oz (93.1 kg)   17 202 lb (91.6 kg)   05/10/17 200 lb 4.8 oz (90.9 kg)              Today, you had the following     No orders found for display       Primary Care Provider    None Specified       No primary provider on file.        Thank you!     Thank you for choosing [facility]  for your care. Our goal is always to provide you with excellent care. Hearing back from our patients is one way we can continue to improve our services. Please take a few minutes to complete the written survey that you may receive in the mail after your visit with us. Thank you!             Your Updated Medication List - Protect others around you: Learn how to safely use, store and throw away your medicines at www.disposemymeds.org.          This list is accurate as of: 17 10:57 AM.  Always use your most recent med list.                   Brand Name Dispense Instructions for use    COLACE PO          prenatal multivitamin  plus iron 27-0.8 MG Tabs per tablet      Take 1 tablet by mouth daily       ranitidine 150 MG tablet    ZANTAC    60 tablet    Take 1 tablet (150 mg) by mouth 2 times daily

## 2017-05-24 NOTE — PROGRESS NOTES
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 Discussed some comfort measures but likely r/t end of pregnancy. Baby is active. No regular contractions, LOF or bleeding. Discussed numbers to call, peds providers. RTC weekly. MML

## 2017-05-30 ENCOUNTER — PRENATAL OFFICE VISIT (OUTPATIENT)
Dept: MIDWIFE SERVICES | Facility: CLINIC | Age: 27
End: 2017-05-30
Payer: COMMERCIAL

## 2017-05-30 VITALS
BODY MASS INDEX: 30.42 KG/M2 | WEIGHT: 206 LBS | HEART RATE: 74 BPM | DIASTOLIC BLOOD PRESSURE: 84 MMHG | TEMPERATURE: 97.4 F | SYSTOLIC BLOOD PRESSURE: 125 MMHG

## 2017-05-30 DIAGNOSIS — Z34.80 SUPERVISION OF OTHER NORMAL PREGNANCY, ANTEPARTUM: Primary | ICD-10-CM

## 2017-05-30 PROCEDURE — 99207 ZZC PRENATAL VISIT: CPT | Performed by: ADVANCED PRACTICE MIDWIFE

## 2017-05-30 NOTE — MR AVS SNAPSHOT
"              After Visit Summary   5/30/2017    Sophie Felton    MRN: [id number]           Patient Information     Date Of Birth          1990        Visit Information        Provider Department      5/30/2017 9:30 AM Alejandro Amador APRN CNM [facility]        Today's Diagnoses     Supervision of other normal pregnancy, antepartum    -  1       Follow-ups after your visit        Your next 10 appointments already scheduled     Jun 06, 2017 11:00 AM CDT   ESTABLISHED PRENATAL with CHRISTOPHER Sweet CNM   [facility] ([facility])    [address] [place] [id number] [phone]              Who to contact     If you have questions or need follow up information about today's clinic visit or your schedule please contact [facility] directly at [phone].  Normal or non-critical lab and imaging results will be communicated to you by MyChart, letter or phone within 4 business days after the clinic has received the results. If you do not hear from us within 7 days, please contact the clinic through MyChart or phone. If you have a critical or abnormal lab result, we will notify you by phone as soon as possible.  Submit refill requests through [employer] or call your pharmacy and they will forward the refill request to us. Please allow 3 business days for your refill to be completed.          Additional Information About Your Visit        MyChart Information     [employer] lets you send messages to your doctor, view your test results, renew your prescriptions, schedule appointments and more. To sign up, go to [email or web address] . Click on \"Log in\" on the left side of the screen, which will take you to the Welcome page. Then click on \"Sign up Now\" on the right side of the page.     You will be asked to enter the access code listed below, as well as some personal information. Please follow the directions to create your " username and password.     Your access code is: [email or web address]  Expires: 2017  9:30 AM     Your access code will  in 90 days. If you need help or a new code, please call your [place] clinic or [phone].        Care EveryWhere ID     This is your Care EveryWhere ID. This could be used by other organizations to access your [place] medical records  [id number]        Your Vitals Were     Pulse Temperature Last Period BMI (Body Mass Index)          74 97.4  F (36.3  C) (Oral) 2016 30.42 kg/m2         Blood Pressure from Last 3 Encounters:   17 125/84   17 124/70   17 103/72    Weight from Last 3 Encounters:   17 206 lb (93.4 kg)   17 205 lb 4.8 oz (93.1 kg)   17 202 lb (91.6 kg)              Today, you had the following     No orders found for display       Primary Care Provider    None Specified       No primary provider on file.        Thank you!     Thank you for choosing [facility]  for your care. Our goal is always to provide you with excellent care. Hearing back from our patients is one way we can continue to improve our services. Please take a few minutes to complete the written survey that you may receive in the mail after your visit with us. Thank you!             Your Updated Medication List - Protect others around you: Learn how to safely use, store and throw away your medicines at www.disposemymeds.org.          This list is accurate as of: 17  3:28 PM.  Always use your most recent med list.                   Brand Name Dispense Instructions for use    COLACE PO          prenatal multivitamin  plus iron 27-0.8 MG Tabs per tablet      Take 1 tablet by mouth daily       ranitidine 150 MG tablet    ZANTAC    60 tablet    Take 1 tablet (150 mg) by mouth 2 times daily

## 2017-06-06 ENCOUNTER — PRENATAL OFFICE VISIT (OUTPATIENT)
Dept: MIDWIFE SERVICES | Facility: CLINIC | Age: 27
End: 2017-06-06
Payer: COMMERCIAL

## 2017-06-06 VITALS
BODY MASS INDEX: 30.72 KG/M2 | SYSTOLIC BLOOD PRESSURE: 118 MMHG | WEIGHT: 208 LBS | DIASTOLIC BLOOD PRESSURE: 81 MMHG | HEART RATE: 99 BPM | OXYGEN SATURATION: 100 %

## 2017-06-06 DIAGNOSIS — Z34.80 SUPERVISION OF OTHER NORMAL PREGNANCY, ANTEPARTUM: Primary | ICD-10-CM

## 2017-06-06 DIAGNOSIS — O48.0 POST-TERM PREGNANCY, 40-42 WEEKS OF GESTATION: ICD-10-CM

## 2017-06-06 PROCEDURE — 59426 ANTEPARTUM CARE ONLY: CPT | Performed by: ADVANCED PRACTICE MIDWIFE

## 2017-06-06 PROCEDURE — 99207 ZZC PRENATAL VISIT: CPT | Performed by: ADVANCED PRACTICE MIDWIFE

## 2017-06-06 NOTE — PROGRESS NOTES
Waiting for baby.  Post dates testing ordered, pt states she delivered 8 days past her due date last time.  Stopped working last Friday so is feeling more time pressure now to deliver. RTC 1 wk JR

## 2017-06-06 NOTE — MR AVS SNAPSHOT
"              After Visit Summary   6/6/2017    Sophie Felton    MRN: [id number]           Patient Information     Date Of Birth          1990        Visit Information        Provider Department      6/6/2017 11:00 AM Romana Lance APRN CNM [facility]        Today's Diagnoses     Supervision of other normal pregnancy, antepartum    -  1    Post-term pregnancy, 40-42 weeks of gestation           Follow-ups after your visit        Future tests that were ordered for you today     Open Future Orders        Priority Expected Expires Ordered    US OB > 14 Weeks Complete Single Routine  6/6/2018 6/6/2017            Who to contact     If you have questions or need follow up information about today's clinic visit or your schedule please contact [facility] directly at [phone].  Normal or non-critical lab and imaging results will be communicated to you by MyChart, letter or phone within 4 business days after the clinic has received the results. If you do not hear from us within 7 days, please contact the clinic through MyChart or phone. If you have a critical or abnormal lab result, we will notify you by phone as soon as possible.  Submit refill requests through [employer] or call your pharmacy and they will forward the refill request to us. Please allow 3 business days for your refill to be completed.          Additional Information About Your Visit        MyChart Information     [employer] lets you send messages to your doctor, view your test results, renew your prescriptions, schedule appointments and more. To sign up, go to [email or web address] . Click on \"Log in\" on the left side of the screen, which will take you to the Welcome page. Then click on \"Sign up Now\" on the right side of the page.     You will be asked to enter the access code listed below, as well as some personal information. Please follow the directions to create your username and password.     Your access " code is: [email or web address]  Expires: 2017  9:30 AM     Your access code will  in 90 days. If you need help or a new code, please call your [place] clinic or [phone].        Care EveryWhere ID     This is your Care EveryWhere ID. This could be used by other organizations to access your [place] medical records  [id number]        Your Vitals Were     Pulse Last Period Pulse Oximetry Breastfeeding? BMI (Body Mass Index)       99 2016 100% No 30.72 kg/m2        Blood Pressure from Last 3 Encounters:   17 118/81   17 125/84   17 124/70    Weight from Last 3 Encounters:   17 208 lb (94.3 kg)   17 206 lb (93.4 kg)   17 205 lb 4.8 oz (93.1 kg)               Primary Care Provider    None Specified       No primary provider on file.        Thank you!     Thank you for choosing [facility]  for your care. Our goal is always to provide you with excellent care. Hearing back from our patients is one way we can continue to improve our services. Please take a few minutes to complete the written survey that you may receive in the mail after your visit with us. Thank you!             Your Updated Medication List - Protect others around you: Learn how to safely use, store and throw away your medicines at www.disposemymeds.org.          This list is accurate as of: 17 12:12 PM.  Always use your most recent med list.                   Brand Name Dispense Instructions for use    COLACE PO          prenatal multivitamin  plus iron 27-0.8 MG Tabs per tablet      Take 1 tablet by mouth daily       ranitidine 150 MG tablet    ZANTAC    60 tablet    Take 1 tablet (150 mg) by mouth 2 times daily

## 2017-06-08 ENCOUNTER — HOSPITAL ENCOUNTER (INPATIENT)
Facility: CLINIC | Age: 27
LOS: 2 days | Discharge: HOME HEALTH | End: 2017-06-10
Attending: ADVANCED PRACTICE MIDWIFE | Admitting: ADVANCED PRACTICE MIDWIFE
Payer: COMMERCIAL

## 2017-06-08 ENCOUNTER — TELEPHONE (OUTPATIENT)
Dept: MIDWIFE SERVICES | Facility: CLINIC | Age: 27
End: 2017-06-08

## 2017-06-08 PROCEDURE — 72200001 ZZH LABOR CARE VAGINAL DELIVERY SINGLE

## 2017-06-08 PROCEDURE — 12000030 ZZH R&B OB INTERMEDIATE UMMC

## 2017-06-08 PROCEDURE — 99214 OFFICE O/P EST MOD 30 MIN: CPT

## 2017-06-08 PROCEDURE — 25000132 ZZH RX MED GY IP 250 OP 250 PS 637: Performed by: ADVANCED PRACTICE MIDWIFE

## 2017-06-08 PROCEDURE — 99215 OFFICE O/P EST HI 40 MIN: CPT

## 2017-06-08 PROCEDURE — 59410 OBSTETRICAL CARE: CPT | Performed by: ADVANCED PRACTICE MIDWIFE

## 2017-06-08 PROCEDURE — 25000132 ZZH RX MED GY IP 250 OP 250 PS 637

## 2017-06-08 RX ORDER — ACETAMINOPHEN 325 MG/1
650 TABLET ORAL EVERY 4 HOURS PRN
Status: DISCONTINUED | OUTPATIENT
Start: 2017-06-08 | End: 2017-06-10 | Stop reason: HOSPADM

## 2017-06-08 RX ORDER — IBUPROFEN 800 MG/1
800 TABLET, FILM COATED ORAL
Status: DISCONTINUED | OUTPATIENT
Start: 2017-06-08 | End: 2017-06-08

## 2017-06-08 RX ORDER — METHYLERGONOVINE MALEATE 0.2 MG/ML
200 INJECTION INTRAVENOUS
Status: DISCONTINUED | OUTPATIENT
Start: 2017-06-08 | End: 2017-06-08

## 2017-06-08 RX ORDER — OXYCODONE AND ACETAMINOPHEN 5; 325 MG/1; MG/1
1 TABLET ORAL
Status: DISCONTINUED | OUTPATIENT
Start: 2017-06-08 | End: 2017-06-08

## 2017-06-08 RX ORDER — ACETAMINOPHEN 325 MG/1
650 TABLET ORAL EVERY 4 HOURS PRN
Status: DISCONTINUED | OUTPATIENT
Start: 2017-06-08 | End: 2017-06-08

## 2017-06-08 RX ORDER — OXYTOCIN 10 [USP'U]/ML
10 INJECTION, SOLUTION INTRAMUSCULAR; INTRAVENOUS
Status: DISCONTINUED | OUTPATIENT
Start: 2017-06-08 | End: 2017-06-08

## 2017-06-08 RX ORDER — CARBOPROST TROMETHAMINE 250 UG/ML
250 INJECTION, SOLUTION INTRAMUSCULAR
Status: DISCONTINUED | OUTPATIENT
Start: 2017-06-08 | End: 2017-06-08

## 2017-06-08 RX ORDER — LANOLIN 100 %
OINTMENT (GRAM) TOPICAL
Status: DISCONTINUED | OUTPATIENT
Start: 2017-06-08 | End: 2017-06-10 | Stop reason: HOSPADM

## 2017-06-08 RX ORDER — OXYTOCIN/0.9 % SODIUM CHLORIDE 30/500 ML
100 PLASTIC BAG, INJECTION (ML) INTRAVENOUS CONTINUOUS
Status: DISCONTINUED | OUTPATIENT
Start: 2017-06-08 | End: 2017-06-10 | Stop reason: HOSPADM

## 2017-06-08 RX ORDER — NALOXONE HYDROCHLORIDE 0.4 MG/ML
.1-.4 INJECTION, SOLUTION INTRAMUSCULAR; INTRAVENOUS; SUBCUTANEOUS
Status: DISCONTINUED | OUTPATIENT
Start: 2017-06-08 | End: 2017-06-08

## 2017-06-08 RX ORDER — OXYTOCIN/0.9 % SODIUM CHLORIDE 30/500 ML
100-340 PLASTIC BAG, INJECTION (ML) INTRAVENOUS CONTINUOUS PRN
Status: DISCONTINUED | OUTPATIENT
Start: 2017-06-08 | End: 2017-06-08

## 2017-06-08 RX ORDER — OXYTOCIN 10 [USP'U]/ML
10 INJECTION, SOLUTION INTRAMUSCULAR; INTRAVENOUS
Status: DISCONTINUED | OUTPATIENT
Start: 2017-06-08 | End: 2017-06-10 | Stop reason: HOSPADM

## 2017-06-08 RX ORDER — SODIUM CHLORIDE, SODIUM LACTATE, POTASSIUM CHLORIDE, CALCIUM CHLORIDE 600; 310; 30; 20 MG/100ML; MG/100ML; MG/100ML; MG/100ML
INJECTION, SOLUTION INTRAVENOUS CONTINUOUS
Status: DISCONTINUED | OUTPATIENT
Start: 2017-06-08 | End: 2017-06-08

## 2017-06-08 RX ORDER — FENTANYL CITRATE 50 UG/ML
50-100 INJECTION, SOLUTION INTRAMUSCULAR; INTRAVENOUS
Status: DISCONTINUED | OUTPATIENT
Start: 2017-06-08 | End: 2017-06-08

## 2017-06-08 RX ORDER — OXYTOCIN/0.9 % SODIUM CHLORIDE 30/500 ML
340 PLASTIC BAG, INJECTION (ML) INTRAVENOUS CONTINUOUS PRN
Status: DISCONTINUED | OUTPATIENT
Start: 2017-06-08 | End: 2017-06-10 | Stop reason: HOSPADM

## 2017-06-08 RX ORDER — HYDROCORTISONE 2.5 %
CREAM (GRAM) TOPICAL 3 TIMES DAILY PRN
Status: DISCONTINUED | OUTPATIENT
Start: 2017-06-08 | End: 2017-06-10 | Stop reason: HOSPADM

## 2017-06-08 RX ORDER — NALOXONE HYDROCHLORIDE 0.4 MG/ML
.1-.4 INJECTION, SOLUTION INTRAMUSCULAR; INTRAVENOUS; SUBCUTANEOUS
Status: DISCONTINUED | OUTPATIENT
Start: 2017-06-08 | End: 2017-06-10 | Stop reason: HOSPADM

## 2017-06-08 RX ORDER — ONDANSETRON 2 MG/ML
4 INJECTION INTRAMUSCULAR; INTRAVENOUS EVERY 6 HOURS PRN
Status: DISCONTINUED | OUTPATIENT
Start: 2017-06-08 | End: 2017-06-08

## 2017-06-08 RX ORDER — OXYTOCIN/0.9 % SODIUM CHLORIDE 30/500 ML
PLASTIC BAG, INJECTION (ML) INTRAVENOUS
Status: DISCONTINUED
Start: 2017-06-08 | End: 2017-06-08 | Stop reason: HOSPADM

## 2017-06-08 RX ORDER — AMOXICILLIN 250 MG
1-2 CAPSULE ORAL 2 TIMES DAILY
Status: DISCONTINUED | OUTPATIENT
Start: 2017-06-08 | End: 2017-06-10 | Stop reason: HOSPADM

## 2017-06-08 RX ORDER — MISOPROSTOL 200 UG/1
400 TABLET ORAL
Status: DISCONTINUED | OUTPATIENT
Start: 2017-06-08 | End: 2017-06-10 | Stop reason: HOSPADM

## 2017-06-08 RX ORDER — OXYTOCIN 10 [USP'U]/ML
INJECTION, SOLUTION INTRAMUSCULAR; INTRAVENOUS
Status: DISCONTINUED
Start: 2017-06-08 | End: 2017-06-08 | Stop reason: HOSPADM

## 2017-06-08 RX ORDER — BISACODYL 10 MG
10 SUPPOSITORY, RECTAL RECTAL DAILY PRN
Status: DISCONTINUED | OUTPATIENT
Start: 2017-06-10 | End: 2017-06-10 | Stop reason: HOSPADM

## 2017-06-08 RX ORDER — IBUPROFEN 400 MG/1
400-800 TABLET, FILM COATED ORAL EVERY 6 HOURS PRN
Status: DISCONTINUED | OUTPATIENT
Start: 2017-06-08 | End: 2017-06-10 | Stop reason: HOSPADM

## 2017-06-08 RX ORDER — OXYCODONE HYDROCHLORIDE 5 MG/1
5-10 TABLET ORAL
Status: DISCONTINUED | OUTPATIENT
Start: 2017-06-08 | End: 2017-06-10 | Stop reason: HOSPADM

## 2017-06-08 RX ORDER — LIDOCAINE HYDROCHLORIDE 10 MG/ML
INJECTION, SOLUTION EPIDURAL; INFILTRATION; INTRACAUDAL; PERINEURAL
Status: DISCONTINUED
Start: 2017-06-08 | End: 2017-06-08 | Stop reason: HOSPADM

## 2017-06-08 RX ADMIN — MISOPROSTOL 800 MCG: 200 TABLET ORAL at 13:03

## 2017-06-08 RX ADMIN — SENNOSIDES AND DOCUSATE SODIUM 2 TABLET: 8.6; 5 TABLET ORAL at 19:31

## 2017-06-08 RX ADMIN — ACETAMINOPHEN 650 MG: 325 TABLET, FILM COATED ORAL at 21:27

## 2017-06-08 RX ADMIN — IBUPROFEN 800 MG: 400 TABLET ORAL at 13:43

## 2017-06-08 RX ADMIN — IBUPROFEN 800 MG: 400 TABLET ORAL at 19:31

## 2017-06-08 NOTE — TELEPHONE ENCOUNTER
Call to pt, pt talking on phone, states she slept on and off through the night, has been losing her mucous plug.  Discussed early labor at home, when to come to the hospital.  Birthplace notified.    Romana Lance CNM

## 2017-06-08 NOTE — IP AVS SNAPSHOT
MRN:[id number]                      After Visit Summary   6/8/2017    Sophie Felton    MRN: [id number]           Thank you!     Thank you for choosing [facility] for your care. Our goal is always to provide you with excellent care. Hearing back from our patients is one way we can continue to improve our services. Please take a few minutes to complete the written survey that you may receive in the mail after you visit with us. Thank you!        Patient Information     Date Of Birth          1990        Designated Caregiver       Most Recent Value    Caregiver    Will someone help with your care after discharge? no      About your hospital stay     You were admitted on:  June 8, 2017 You last received care in the:  [facility]    You were discharged on:  Lorrie 10, 2017       Who to Call     For medical emergencies, please call 911.  For non-urgent questions about your medical care, please call your primary care provider or clinic, None          Attending Provider     Provider Specialty    Romana Lance APRN CNM OB/Gyn       Primary Care Provider    Physician No Ref-Primary      Further instructions from your care team       Postpartum Vaginal Delivery Instructions    Activity       Ask family and friends for help when you need it.    Do not place anything in your vagina for 6 weeks.    You are not restricted on other activities, but take it easy for a few weeks to allow your body to recover from delivery.  You are able to do any activities you feel up to that point.    No driving until you have stopped taking your pain medications (usually two weeks after delivery).     Call your health care provider if you have any of these symptoms:       Increased pain, swelling, redness, or fluid around your stiches from an episiotomy or perineal tear.    A fever above 100.4 F (38 C) with or without chills when placing a thermometer under your tongue.    You soak a sanitary pad with blood within 1 hour,  "or you see blood clots larger than a golf ball.    Bleeding that lasts more than 6 weeks.    Vaginal discharge that smells bad.    Severe pain, cramping or tenderness in your lower belly area.    A need to urinate more frequently (use the toilet more often), more urgently (use the toilet very quickly), or it burns when you urinate.    Nausea and vomiting.    Redness, swelling or pain around a vein in your leg.    Problems breastfeeding or a red or painful area on your breast.    Chest pain and cough or are gasping for air.    Problems coping with sadness, anxiety, or depression.  If you have any concerns about hurting yourself or the baby, call your provider immediately.     You have questions or concerns after you return home.     Keep your hands clean:  Always wash your hands before touching your perineal area and stitches.  This helps reduce your risk of infection.  If your hands aren't dirty, you may use an alcohol hand-rub to clean your hands. Keep your nails clean and short.        Pending Results     No orders found from 6/6/2017 to 6/9/2017.            Statement of Approval     Ordered          06/10/17 1104  I have reviewed and agree with all the recommendations and orders detailed in this document.  EFFECTIVE NOW     Approved and electronically signed by:  Augusta Kim APRN CNM             Admission Information     Date & Time Provider Department Dept. Phone    6/8/2017 Romana Lance APRN CNM [facility] [phone]      Your Vitals Were     Blood Pressure Pulse Temperature Respirations Height Weight    125/86 73 97.6  F (36.4  C) (Oral) 18 1.778 m (5' 10\") 94.3 kg (208 lb)    Last Period Pulse Oximetry BMI (Body Mass Index)             08/24/2016 98% 29.84 kg/m2         MyChart Information     [employer] lets you send messages to your doctor, view your test results, renew your prescriptions, schedule appointments and more. To sign up, go to [email or web address] . Click on \"Log in\" on the left " "side of the screen, which will take you to the Welcome page. Then click on \"Sign up Now\" on the right side of the page.     You will be asked to enter the access code listed below, as well as some personal information. Please follow the directions to create your username and password.     Your access code is: [email or web address]  Expires: 2017  9:30 AM     Your access code will  in 90 days. If you need help or a new code, please call your [facility] or [phone].        Care EveryWhere ID     This is your Care EveryWhere ID. This could be used by other organizations to access your [place] medical records  [id number]           Review of your medicines      CONTINUE these medicines which have NOT CHANGED        Dose / Directions    COLACE PO        Refills:  0       prenatal multivitamin  plus iron 27-0.8 MG Tabs per tablet        Dose:  1 tablet   Take 1 tablet by mouth daily   Refills:  0       ranitidine 150 MG tablet   Commonly known as:  ZANTAC   Used for:  Heartburn during pregnancy in third trimester        Dose:  150 mg   Take 1 tablet (150 mg) by mouth 2 times daily   Quantity:  60 tablet   Refills:  1                Protect others around you: Learn how to safely use, store and throw away your medicines at www.disposemymeds.org.             Medication List: This is a list of all your medications and when to take them. Check marks below indicate your daily home schedule. Keep this list as a reference.      Medications           Morning Afternoon Evening Bedtime As Needed    COLACE PO                                prenatal multivitamin  plus iron 27-0.8 MG Tabs per tablet   Take 1 tablet by mouth daily                                ranitidine 150 MG tablet   Commonly known as:  ZANTAC   Take 1 tablet (150 mg) by mouth 2 times daily   Last time this was given:  150 mg on 6/10/2017  8:23 AM                                  "

## 2017-06-08 NOTE — PROGRESS NOTES
Pt is breathing with contractions and appears that she is in active labor currently.  Will admit and transfer to a room.    Offer comfort measures, bath tub, nitrous.    Romana Lance CNM

## 2017-06-08 NOTE — PLAN OF CARE
Problem: Postpartum, Vaginal Delivery (Adult)  Goal: Signs and Symptoms of Listed Potential Problems Will be Absent or Manageable (Postpartum, Vaginal Delivery)  Signs and symptoms of listed potential problems will be absent or manageable by discharge/transition of care (reference Postpartum, Vaginal Delivery (Adult) CPG).   Outcome: Therapy, progress toward functional goals as expected  Data: Sophie Felton transferred to [facility] via wheelchair at 1505. Baby transferred via parent's arms.  Action: Receiving unit notified of transfer: Yes. Patient and family notified of room change. Report given to Shanda at 1520. Belongings sent to receiving unit. Accompanied by Registered Nurse. Oriented patient to surroundings. Call light within reach. ID bands double-checked with receiving RN.  Response: Patient tolerated transfer and is stable.

## 2017-06-08 NOTE — TELEPHONE ENCOUNTER
Pt's  called in and stated that they were going to be heading in to the hospital soon. Ctx started yesterday 2:00pm, they are currently 5-6 mins apart, lasting about a min, a little mucous/spotting, + FM, GBS negative  at 40w2d. Informed them that the midwife on call would call them to come up with a plan, but if things progressed rapidly before they here from her, they should go on in to the hospital. Shanda Feliz, RN

## 2017-06-08 NOTE — L&D DELIVERY NOTE
Delivery Note  IUP at 40w2d  weeks gestation delivered on 2017.     delivery of a viable  Female infant.  Apgars of 8 at 1 minute and 9 at 5 minutes.  Labor was spontaneous.  Medications administered  in labor:  Pain Rx nitrous; Antibiotics No; Other none  Perineum: Intact  Placenta-mechanism: spontaneous, intact,  with a 3 vessel cord. Rectal misoprostol was given.  QBL being calculated.  Complications of regency, labor and delivery: None, beautiful birth  Birth attendants: Romana Lance CNZORA    Delivery Summary    Sophie Felton MRN# [id number]   Age: 27 year old YOB: 1990     ASSESSMENT & PLAN:         Rupture date/time:     Rupture type:  Spontaneous rupture of membranes occuring during spontaneous labor or augmentation      Delivery/Placenta Date and Time    Delivery Date:  17 Delivery Time:  12:57 PM      Apgars    Living status:  Living    1 Minute 5 Minute 10 Minute 15 Minute 20 Minute   Skin color: 1  1       Heart rate: 2  2       Reflex irritability: 1  2       Muscle tone: 2  2       Respiratory effort: 2  2       Total: 8  9          Apgars assigned by:  STACEY ANDRADE RN      Cord    Vessels:  3 Vessels Complications:  None   Cord Blood Disposition:  Lab Gases Sent?:  No          Resuscitation    Methods:  None       Care at Delivery:  Infant born with spontaneous cry. Infant dried and stimulated and placed skin to skin on mother's abdomen.       Skin to Skin and Feeding Plan    Skin to skin initiation date/time: 17 1257   Skin to skin with:  Mother   Skin to skin end date/time:        Labor Events and Shoulder Dystocia    Fetal Tracing Prior to Delivery:  Category 1   Shoulder dystocia present?:  Neg            Delivery (Maternal) (Provider to Complete) (109894)    Episiotomy:  None   Perineal lacerations:  None          Mother's Information  Mother: Sophie Felton #[id number]    Start of Mother's Information     IO Blood Loss  17 0057 -  06/08/17 1316    Mom's I/O Activity            End of Mother's Information  Mother: Sophie Felton #[id number]            Delivery - Provider to Complete (588900)    Delivering clinician:  KEYSHA SANTANA CNM Care:  Exclusive CNM care in labor   Attempted Delivery Types (Choose all that apply):  Spontaneous Vaginal Delivery   Delivery Type (Choose the 1 that will go to the Birth History):  Vaginal, Spontaneous Delivery                           Placenta    Delayed Cord Clamping:  Done   Removal:  Spontaneous   Disposition:  Hospital disposal      Anesthesia    Method:  Nitrous Oxide         Presentation and Position    Presentation:  Vertex   Position:  Left Occiput Anterior                    CHRISTOPHER Hopkins CNM

## 2017-06-08 NOTE — IP AVS SNAPSHOT
[facility]    [address] [address]    Phone:  [phone]                                       After Visit Summary   6/8/2017    Sophie Felton    MRN: [id number]           After Visit Summary Signature Page     I have received my discharge instructions, and my questions have been answered. I have discussed any challenges I see with this plan with the nurse or doctor.    ..........................................................................................................................................  Patient/Patient Representative Signature      ..........................................................................................................................................  Patient Representative Print Name and Relationship to Patient    ..................................................               ................................................  Date                                            Time    ..........................................................................................................................................  Reviewed by Signature/Title    ...................................................              ..............................................  Date                                                            Time

## 2017-06-09 LAB — HGB BLD-MCNC: 11.2 G/DL (ref 11.7–15.7)

## 2017-06-09 PROCEDURE — 85018 HEMOGLOBIN: CPT | Performed by: ADVANCED PRACTICE MIDWIFE

## 2017-06-09 PROCEDURE — 25000132 ZZH RX MED GY IP 250 OP 250 PS 637: Performed by: ADVANCED PRACTICE MIDWIFE

## 2017-06-09 PROCEDURE — 36415 COLL VENOUS BLD VENIPUNCTURE: CPT | Performed by: ADVANCED PRACTICE MIDWIFE

## 2017-06-09 PROCEDURE — 12000030 ZZH R&B OB INTERMEDIATE UMMC

## 2017-06-09 RX ADMIN — ACETAMINOPHEN 650 MG: 325 TABLET, FILM COATED ORAL at 09:21

## 2017-06-09 RX ADMIN — RANITIDINE HYDROCHLORIDE 150 MG: 150 TABLET, FILM COATED ORAL at 23:34

## 2017-06-09 RX ADMIN — ACETAMINOPHEN 650 MG: 325 TABLET, FILM COATED ORAL at 05:05

## 2017-06-09 RX ADMIN — IBUPROFEN 800 MG: 400 TABLET ORAL at 01:11

## 2017-06-09 RX ADMIN — IBUPROFEN 800 MG: 400 TABLET ORAL at 08:02

## 2017-06-09 RX ADMIN — IBUPROFEN 800 MG: 400 TABLET ORAL at 20:10

## 2017-06-09 RX ADMIN — IBUPROFEN 800 MG: 400 TABLET ORAL at 13:48

## 2017-06-09 RX ADMIN — ACETAMINOPHEN 650 MG: 325 TABLET, FILM COATED ORAL at 13:49

## 2017-06-09 RX ADMIN — OXYCODONE HYDROCHLORIDE 5 MG: 5 TABLET ORAL at 05:19

## 2017-06-09 RX ADMIN — ACETAMINOPHEN 650 MG: 325 TABLET, FILM COATED ORAL at 22:25

## 2017-06-09 RX ADMIN — SENNOSIDES AND DOCUSATE SODIUM 1 TABLET: 8.6; 5 TABLET ORAL at 18:16

## 2017-06-09 RX ADMIN — ACETAMINOPHEN 650 MG: 325 TABLET, FILM COATED ORAL at 18:16

## 2017-06-09 RX ADMIN — ACETAMINOPHEN 650 MG: 325 TABLET, FILM COATED ORAL at 01:11

## 2017-06-09 NOTE — PLAN OF CARE
Problem: Goal Outcome Summary  Goal: Goal Outcome Summary  Outcome: Improving  Data: Vital signs within normal limits. Postpartum checks within normal limits - see flow record. Patient eating and drinking normally. Patient able to empty bladder independently and is up ambulating. Patient performing self cares and is able to care for infant.  Action: Pain has been adequately managed with Tylenol, Ibuprofen, and hot packs. Patient education done about hand expression. Will continue to offer assistance with breast feeding. Pt hand expressing.  Response: Positive attachment behaviors observed with infant. Pt's mother visited earlier this evening.   Plan: Continue with the plan of care.

## 2017-06-09 NOTE — LACTATION NOTE
This note was copied from a baby's chart.  Met with family on rounds, called back at time of latch.  Mom  first baby with no use of formula for 8 months, remembers some difficulty in first couple days with bottom lip, latch but all was working well by the time they got home. Infant now with some difficulty getting bottom lip out and staying interested in feeding, latches but often no or disorganized suck. Mom with excellent technique, reinforce good hold of baby and breast & encourage aiming high and trying to keep hand low on baby's head. Encourage breast massage briefly before, hand express drops prn to get him started (mom easily expressed several drops) and hand expression to spoon after each feeding & give her results. Dad here also, helpful with positioning and comforting baby, encouraging mom. Infant fussy & hesitant to suck, demo sucking on finger first & latch after get organized suck. Large burp, encourage skin to skin & burp then try latch again but if not latched after 20 min attempt, ok to hand express and give that - try at breast again with next cues.

## 2017-06-09 NOTE — PROGRESS NOTES
"Post Partum Note    Sophie Felton      MRN#: [id number]  Age: 27 year old      YOB: 1990      Post-partum day #1    SIGNIFICANT PROBLEMS:  Patient Active Problem List    Diagnosis Date Noted     Normal labor and delivery 2017     Priority: Medium     Heartburn during pregnancy in third trimester 2017     Priority: Medium     Supervision of other normal pregnancy, antepartum 2017     Priority: Medium     Need for Tdap vaccination 2017     Priority: Medium     Given 2017         INTERVAL HISTORY:  /87  Pulse 73  Temp 98.4  F (36.9  C) (Oral)  Resp 16  Ht 1.778 m (5' 10\")  Wt 94.3 kg (208 lb)  LMP 2016  Breastfeeding? Unknown  BMI 29.84 kg/m2    Pt stable, baby is rooming in  Breast feeding status:initiated but problematic as daughter is not latching on well and having to hand express into spoon to make sure she is getting calories.  Wt down 3 oz since birth.  Peds monitoring.  Complications since 2 hours post delivery: None  Patient is tolerating acitivity well Voiding without difficulty, cramping is relieved by Ibuprophen, lochia is decreasing and patient denies clots.  Perineal pain is is minimal.  The perineum is intact    Normal postpartum exam    Postpartum hemoglobin   Hemoglobin   Date Value Ref Range Status   2017 11.2 (L) 11.7 - 15.7 g/dL Final     Blood type   Lab Results   Component Value Date    ABO A 10/27/2016       Lab Results   Component Value Date    RH Pos 10/27/2016     Rubella status No results found for: RUBELLAABIGG    ASSESSMENT/PLAN:  Stable Post-partum day #1.  Complications:breastfeeding difficulties - latch.    Plan d/c home tomorrow  RTC 6 weeks  Teaching done: D/C Instructions: Nutrition/Activity, Engorgement Management, Birth Control Options, Warning Signs/When to Call: Excessive Bleeding, Infection, PP Depression, Kegals and Crunches, RTC Clinic for PP Appointment and PNV    Postpartum warning s/s reviewed, " including bleeding/clots, fever, mastitis, or depression    Birthcontrol planned:Condoms  Current Discharge Medication List      CONTINUE these medications which have NOT CHANGED    Details   ranitidine (ZANTAC) 150 MG tablet Take 1 tablet (150 mg) by mouth 2 times daily  Qty: 60 tablet, Refills: 1    Associated Diagnoses: Heartburn during pregnancy in third trimester      Prenatal Vit-Fe Fumarate-FA (PRENATAL MULTIVITAMIN  PLUS IRON) 27-0.8 MG TABS per tablet Take 1 tablet by mouth daily      Docusate Sodium (COLACE PO)

## 2017-06-09 NOTE — PLAN OF CARE
Problem: Goal Outcome Summary  Goal: Goal Outcome Summary  Outcome: Improving  Data: Vital signs within normal limits. Postpartum checks within normal limits - see flow record. Patient eating and drinking normally. Patient able to empty bladder independently and is up ambulating. Patient performing self cares and is able to care for infant. Pt passed one clot in the toilet that was hard to visualize. Fundus firm at U/1, small amount of lochia. No further clots. Instructed pt to call if she passes any more clots or feels any gushes of fluid.   Action: Patient medicated during the shift for pain. See MAR. Patient reassessed within 1 hour after each medication and pain was improved - patient stated she was comfortable. Patient education done about hand expression and checking babys latch. See flow record.  Response: Positive attachment behaviors observed with infant. Support person, spouse, present.   Plan: Continue with the plan of care.

## 2017-06-10 ENCOUNTER — NURSE TRIAGE (OUTPATIENT)
Dept: NURSING | Facility: CLINIC | Age: 27
End: 2017-06-10

## 2017-06-10 VITALS
HEART RATE: 73 BPM | BODY MASS INDEX: 29.78 KG/M2 | WEIGHT: 208 LBS | DIASTOLIC BLOOD PRESSURE: 86 MMHG | OXYGEN SATURATION: 98 % | SYSTOLIC BLOOD PRESSURE: 125 MMHG | TEMPERATURE: 97.6 F | HEIGHT: 70 IN | RESPIRATION RATE: 18 BRPM

## 2017-06-10 PROCEDURE — 25000132 ZZH RX MED GY IP 250 OP 250 PS 637: Performed by: ADVANCED PRACTICE MIDWIFE

## 2017-06-10 RX ADMIN — RANITIDINE HYDROCHLORIDE 150 MG: 150 TABLET, FILM COATED ORAL at 08:23

## 2017-06-10 RX ADMIN — ACETAMINOPHEN 650 MG: 325 TABLET, FILM COATED ORAL at 02:09

## 2017-06-10 RX ADMIN — IBUPROFEN 800 MG: 400 TABLET ORAL at 08:23

## 2017-06-10 RX ADMIN — ACETAMINOPHEN 650 MG: 325 TABLET, FILM COATED ORAL at 08:23

## 2017-06-10 RX ADMIN — SENNOSIDES AND DOCUSATE SODIUM 1 TABLET: 8.6; 5 TABLET ORAL at 08:23

## 2017-06-10 RX ADMIN — IBUPROFEN 800 MG: 400 TABLET ORAL at 02:09

## 2017-06-10 NOTE — PLAN OF CARE
Problem: Goal Outcome Summary  Goal: Goal Outcome Summary  Outcome: Adequate for Discharge Date Met:  06/10/17  Patient taking care of self and infant independently. Breastfeeding independently, good latch observed. Postpartum checks wnl. Sent with electric breast pump, educated on use and care of pump.  Pt educated on discharge instructions and given written handout, verbalized understanding of instructions. Patient discharged via wheel chair with infant at 1154.

## 2017-06-10 NOTE — LACTATION NOTE
This note was copied from a baby's chart.  Resource RN assisted with infant feedings this evening. Tried finger training, infant responded by shallow sucking, gagging, tongue thrusting, and biting. Infant eventually developed a uncoordinated suck. Hand expressed and spoon fed mother's abundant colostrum. Attempted latching with sandwich hold support after finger training. Infant did not achieve a latch. Or would latch and suck a couple times then remove self. Infant was often frustrated and fighting feeding. Nipple shield used intermittently, though infant was unable to maintain a sustained suck with nipple shield. At 2200 feeding, infant did latch and was content at breast with nipple shield. Infant did suck a couple of times over the fifteen minute period. It was the first time the infant had stayed content that long at breast. This was still a fair feeding. Continue to work at gentle feedings, try to have positive experiences at the breast and latching. Hand express and spoon feed if infant is not sustaining a feeding at breast. Pediatrician should further assess infant in am. Continue to monitor/assess and assist as needed.

## 2017-06-10 NOTE — PLAN OF CARE
Problem: Goal Outcome Summary  Goal: Goal Outcome Summary  Outcome: Improving  Patient able to rest this afternoon for a few hours. States adequate pain control.

## 2017-06-10 NOTE — PLAN OF CARE
Problem: Goal Outcome Summary  Goal: Goal Outcome Summary  Outcome: Improving  PP assessment WNL. Vital signs stable. Pt up ad jamie, steady gait. Intake and output remains appropriate. Pain managed with Ibuprofen and tylenol-see MAR. Breastfeeding infant with assistance.  is in room, positive support system. Will continue with plan of care.

## 2017-06-10 NOTE — DISCHARGE SUMMARY
"Post Partum Note    Sophie Felton      MRN#: [id number]  Age: 27 year old      YOB: 1990      Post-partum day #2    SIGNIFICANT PROBLEMS:  Patient Active Problem List    Diagnosis Date Noted     Normal labor and delivery 2017     Priority: Medium     Heartburn during pregnancy in third trimester 2017     Priority: Medium     Supervision of other normal pregnancy, antepartum 2017     Priority: Medium     Need for Tdap vaccination 2017     Priority: Medium     Given 2017         INTERVAL HISTORY:  /86  Pulse 73  Temp 97.6  F (36.4  C) (Oral)  Resp 18  Ht 1.778 m (5' 10\")  Wt 94.3 kg (208 lb)  LMP 2016  SpO2 98%  Breastfeeding? Unknown  BMI 29.84 kg/m2    Pt stable, baby is rooming in  Breast feeding status:initiated - having some difficulties with latch, using nipple shield. Has information for LC. Plans to make appointment early next week.   Complications since 2 hours post delivery: None  Patient is tolerating activity well, voiding without difficulty, cramping is minimal and is relieved by Ibuprofen, lochia is decreasing and patient denies clots.  Perineal pain is is minimal and is relieved by Ibuprofen.  The perineum is intact.    Normal postpartum exam    Postpartum hemoglobin   Hemoglobin   Date Value Ref Range Status   2017 11.2 (L) 11.7 - 15.7 g/dL Final     Blood type   Lab Results   Component Value Date    ABO A 10/27/2016       Lab Results   Component Value Date    RH Pos 10/27/2016     Rubella immune    ASSESSMENT/PLAN:  Stable Post-partum day #2  Complications:breastfeeding difficulties  Plan d/c home today  RTC 6 weeks  Teaching done: Birth Control Options, Warning Signs/When to Call: Excessive Bleeding, Infection, PP Depression, RTC Clinic for PP Appointment and PNV    Postpartum warning s/s reviewed, including bleeding/clots, fever, mastitis, or depression    Birthcontrol planned:Condoms  Current Discharge Medication " List      CONTINUE these medications which have NOT CHANGED    Details   ranitidine (ZANTAC) 150 MG tablet Take 1 tablet (150 mg) by mouth 2 times daily  Qty: 60 tablet, Refills: 1    Associated Diagnoses: Heartburn during pregnancy in third trimester      Prenatal Vit-Fe Fumarate-FA (PRENATAL MULTIVITAMIN  PLUS IRON) 27-0.8 MG TABS per tablet Take 1 tablet by mouth daily      Docusate Sodium (COLACE PO)          Augusta Kim CNM

## 2017-07-24 ENCOUNTER — PRENATAL OFFICE VISIT (OUTPATIENT)
Dept: MIDWIFE SERVICES | Facility: CLINIC | Age: 27
End: 2017-07-24
Payer: COMMERCIAL

## 2017-07-24 VITALS
DIASTOLIC BLOOD PRESSURE: 63 MMHG | BODY MASS INDEX: 27.26 KG/M2 | SYSTOLIC BLOOD PRESSURE: 110 MMHG | TEMPERATURE: 99.3 F | WEIGHT: 190 LBS | HEART RATE: 80 BPM

## 2017-07-24 PROBLEM — Z34.80 SUPERVISION OF OTHER NORMAL PREGNANCY, ANTEPARTUM: Status: RESOLVED | Noted: 2017-04-05 | Resolved: 2017-07-24

## 2017-07-24 PROBLEM — R12 HEARTBURN DURING PREGNANCY IN THIRD TRIMESTER: Status: RESOLVED | Noted: 2017-05-16 | Resolved: 2017-07-24

## 2017-07-24 PROBLEM — O26.893 HEARTBURN DURING PREGNANCY IN THIRD TRIMESTER: Status: RESOLVED | Noted: 2017-05-16 | Resolved: 2017-07-24

## 2017-07-24 PROBLEM — Z23 NEED FOR TDAP VACCINATION: Status: RESOLVED | Noted: 2017-03-06 | Resolved: 2017-07-24

## 2017-07-24 PROCEDURE — 99207 ZZC POST PARTUM EXAM: CPT | Performed by: ADVANCED PRACTICE MIDWIFE

## 2017-07-24 NOTE — NURSING NOTE
"Chief Complaint   Patient presents with     Consult       Initial /63 (BP Location: Right arm, Patient Position: Sitting, Cuff Size: Adult Regular)  Pulse 80  Temp 99.3  F (37.4  C) (Oral)  Wt 190 lb (86.2 kg)  Breastfeeding? Yes  BMI 27.26 kg/m2 Estimated body mass index is 27.26 kg/(m^2) as calculated from the following:    Height as of 17: 5' 10\" (1.778 m).    Weight as of this encounter: 190 lb (86.2 kg).  BP completed using cuff size: regular        The following HM Due: NONE      The following patient reported/Care Every where data was sent to:  P ABSTRACT QUALITY INITIATIVES [[id number]]       patient has appointment for today    Glenn Logan               "

## 2017-07-24 NOTE — MR AVS SNAPSHOT
"              After Visit Summary   2017    Sophie Felton    MRN: [id number]           Patient Information     Date Of Birth          1990        Visit Information        Provider Department      2017 11:15 AM Jane Gray APRN CNM [facility]        Today's Diagnoses     Routine postpartum follow-up    -  1       Follow-ups after your visit        Who to contact     If you have questions or need follow up information about today's clinic visit or your schedule please contact [facility] directly at [phone].  Normal or non-critical lab and imaging results will be communicated to you by [employer], letter or phone within 4 business days after the clinic has received the results. If you do not hear from us within 7 days, please contact the clinic through [employer] or phone. If you have a critical or abnormal lab result, we will notify you by phone as soon as possible.  Submit refill requests through [employer] or call your pharmacy and they will forward the refill request to us. Please allow 3 business days for your refill to be completed.          Additional Information About Your Visit        MyChart Information     [employer] lets you send messages to your doctor, view your test results, renew your prescriptions, schedule appointments and more. To sign up, go to [email or web address] . Click on \"Log in\" on the left side of the screen, which will take you to the Welcome page. Then click on \"Sign up Now\" on the right side of the page.     You will be asked to enter the access code listed below, as well as some personal information. Please follow the directions to create your username and password.     Your access code is: [email or web address]  Expires: 2017  9:30 AM     Your access code will  in 90 days. If you need help or a new code, please call your [facility] or [phone].        Care EveryWhere ID     This is your Care EveryWhere ID. This could be " used by other organizations to access your [place] medical records  [id number]        Your Vitals Were     Pulse Temperature Breastfeeding? BMI (Body Mass Index)          80 99.3  F (37.4  C) (Oral) Yes 27.26 kg/m2         Blood Pressure from Last 3 Encounters:   07/24/17 110/63   06/10/17 125/86   06/06/17 118/81    Weight from Last 3 Encounters:   07/24/17 190 lb (86.2 kg)   06/08/17 208 lb (94.3 kg)   06/06/17 208 lb (94.3 kg)              Today, you had the following     No orders found for display       Primary Care Provider    Physician No Ref-Primary       No address on file        Equal Access to Services     BERT RALPH : Sandra Galvin, morena basilio, hung garcias, nitza bravo . [facility] [phone].    ATENCIÓN: Si habla español, tiene a bowman disposición servicios gratuitos de asistencia lingüística. Llame al [phone].    We comply with applicable federal civil rights laws and [place] laws. We do not discriminate on the basis of race, color, national origin, age, disability sex, sexual orientation or gender identity.            Thank you!     Thank you for choosing [facility]  for your care. Our goal is always to provide you with excellent care. Hearing back from our patients is one way we can continue to improve our services. Please take a few minutes to complete the written survey that you may receive in the mail after your visit with us. Thank you!             Your Updated Medication List - Protect others around you: Learn how to safely use, store and throw away your medicines at www.disposemymeds.org.          This list is accurate as of: 7/24/17 12:15 PM.  Always use your most recent med list.                   Brand Name Dispense Instructions for use Diagnosis    COLACE PO           prenatal multivitamin  plus iron 27-0.8 MG Tabs per tablet      Take 1 tablet by mouth daily        ranitidine 150 MG tablet    ZANTAC     60 tablet    Take 1 tablet (150 mg) by mouth 2 times daily    Heartburn during pregnancy in third trimester

## 2017-07-24 NOTE — LETTER
[address] [address]  [phone]      July 24, 2017      Sophie Felton  [address] [place] [id number]              To Whom It May Concern:    Sophie Felton is being seen in our clinic for prenatal care.  She had an uncomplicated delivery on June 8, 2017.   Ms. Felton is able to return to work on August 8, 2017.    This patient should be allowed 2-15 minute breaks and a 30 minute break per 8 hour shift.  She should also have breast pumping time.     Sincerely,        Jane Gray CNM

## 2017-07-24 NOTE — PROGRESS NOTES
SUBJECTIVE: Sophie Felton is here for a 6-week postpartum checkup.    Delivery date was 17. She had a  of a viable girl, weight 6 pounds 14 oz., with Breast fed complications.  Since delivery, she has been breast pumping and feeding infant with bottle  She has no signs of infection, bleeding or other complications.    We discussed contraception and she has chosen condoms.  She  has had intercourse since delivery and complains of trace discomfort. Patient screened for postpartum depression and complaints are positive for sleep disturbance and depression. Screening has also been completed for intimate partner violence.    EXAM:  GENERAL healthy, alert, mild distress, cooperative and crying  NECK: thyroid normal to palpation and trachea midline and normal to palpation  RESP: Clear to auscultation  BREASTS: NEGATIVE, not examined and filling breast pumping 4-5 times per day  CV: NSR without murmur  GYN PELVIC:deferred  MS: reflexes +2 no clonus  PSYCH: positive for sleep disturbance, anxiety and depression  Today's Depression Rating was 13    ASSESSMENT:   Normal postpartum exam after .    PLAN:  Return as needed or at time interval for next routine pap, pelvic, or breast exam.  Encourage Kegals and abdominal exercise.  Continue a multi vitamin supplement, especially if breastfeeding.  Pap smear was not obtained.  Post partum Hgb was not obtained.  Condoms for birth control    Patient has had difficulty breastfeeding from the start.  Seems to be infant not wanting to get on breast.  Pt has been pumping and bottle feeding infant.  Pt is feeling a little overwhelmed about this.  Pt has seen lactation several times and infant will not get on breast.  Discussed at length about stopping if she wants too, pt concerned about cost of formula feeding.  Reviewed that consider reducing pumping and 1/2 formula 1/2 breastfeed.  Pt was teary several times.  Letter written to return to work on .  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  Discussed quitting breastfeeding all together. Pt states depression /anxiety related to this.  Pt will consider all options.    rtc in 1 year.

## 2017-07-25 ASSESSMENT — PATIENT HEALTH QUESTIONNAIRE - PHQ9: SUM OF ALL RESPONSES TO PHQ QUESTIONS 1-9: 13

## 2018-07-10 ASSESSMENT — PATIENT HEALTH QUESTIONNAIRE - PHQ9
SUM OF ALL RESPONSES TO PHQ QUESTIONS 1-9: 7
SUM OF ALL RESPONSES TO PHQ QUESTIONS 1-9: 7
10. IF YOU CHECKED OFF ANY PROBLEMS, HOW DIFFICULT HAVE THESE PROBLEMS MADE IT FOR YOU TO DO YOUR WORK, TAKE CARE OF THINGS AT HOME, OR GET ALONG WITH OTHER PEOPLE: SOMEWHAT DIFFICULT

## 2018-07-11 ASSESSMENT — PATIENT HEALTH QUESTIONNAIRE - PHQ9: SUM OF ALL RESPONSES TO PHQ QUESTIONS 1-9: 7

## 2018-07-12 ENCOUNTER — OFFICE VISIT (OUTPATIENT)
Dept: FAMILY MEDICINE | Facility: CLINIC | Age: 28
End: 2018-07-12
Payer: COMMERCIAL

## 2018-07-12 VITALS
DIASTOLIC BLOOD PRESSURE: 62 MMHG | HEART RATE: 69 BPM | SYSTOLIC BLOOD PRESSURE: 112 MMHG | RESPIRATION RATE: 12 BRPM | WEIGHT: 172.4 LBS | TEMPERATURE: 98.1 F | OXYGEN SATURATION: 100 % | BODY MASS INDEX: 25.53 KG/M2 | HEIGHT: 69 IN

## 2018-07-12 DIAGNOSIS — L85.8 KERATOSIS PILARIS: ICD-10-CM

## 2018-07-12 DIAGNOSIS — L40.9 SCALP PSORIASIS: ICD-10-CM

## 2018-07-12 DIAGNOSIS — Z80.0 FAMILY HISTORY OF COLON CANCER: ICD-10-CM

## 2018-07-12 DIAGNOSIS — F32.0 MILD MAJOR DEPRESSION (H): ICD-10-CM

## 2018-07-12 DIAGNOSIS — Z00.01 ENCOUNTER FOR ROUTINE ADULT PHYSICAL EXAM WITH ABNORMAL FINDINGS: Primary | ICD-10-CM

## 2018-07-12 PROBLEM — J30.2 SEASONAL ALLERGIC RHINITIS: Status: ACTIVE | Noted: 2018-07-12

## 2018-07-12 LAB — TSH SERPL DL<=0.005 MIU/L-ACNC: 1.1 MU/L (ref 0.4–4)

## 2018-07-12 PROCEDURE — 82306 VITAMIN D 25 HYDROXY: CPT | Performed by: FAMILY MEDICINE

## 2018-07-12 PROCEDURE — 99385 PREV VISIT NEW AGE 18-39: CPT | Performed by: FAMILY MEDICINE

## 2018-07-12 PROCEDURE — 99214 OFFICE O/P EST MOD 30 MIN: CPT | Mod: 25 | Performed by: FAMILY MEDICINE

## 2018-07-12 PROCEDURE — 84443 ASSAY THYROID STIM HORMONE: CPT | Performed by: FAMILY MEDICINE

## 2018-07-12 PROCEDURE — 36415 COLL VENOUS BLD VENIPUNCTURE: CPT | Performed by: FAMILY MEDICINE

## 2018-07-12 RX ORDER — FLUOCINONIDE TOPICAL SOLUTION USP, 0.05% 0.5 MG/ML
SOLUTION TOPICAL
Qty: 60 ML | Refills: 0 | Status: SHIPPED | OUTPATIENT
Start: 2018-07-12 | End: 2019-02-21

## 2018-07-12 RX ORDER — LORATADINE 10 MG/1
20 TABLET ORAL DAILY
COMMUNITY

## 2018-07-12 RX ORDER — AMMONIUM LACTATE 12 G/100G
LOTION TOPICAL 2 TIMES DAILY PRN
Qty: 360 G | Refills: 3 | Status: SHIPPED | OUTPATIENT
Start: 2018-07-12

## 2018-07-12 RX ORDER — ESCITALOPRAM OXALATE 20 MG/1
TABLET ORAL
Qty: 30 TABLET | Refills: 0 | Status: SHIPPED | OUTPATIENT
Start: 2018-07-12 | End: 2018-08-07

## 2018-07-12 ASSESSMENT — PAIN SCALES - GENERAL: PAINLEVEL: NO PAIN (0)

## 2018-07-12 NOTE — LETTER
My Depression Action Plan  Name: Sophie Felton   Date of Birth 1990  Date: 7/12/2018    My doctor: No Ref-Primary, Physician   My clinic: [address]  [place] [address]  [phone]          GREEN    ZONE   Good Control    What it looks like:     Things are going generally well. You have normal up s and down s. You may even feel depressed from time to time, but bad moods usually last less than a day.   What you need to do:  1. Continue to care for yourself (see self care plan)  2. Check your depression survival kit and update it as needed  3. Follow your physician s recommendations including any medication.  4. Do not stop taking medication unless you consult with your physician first.           YELLOW         ZONE Getting Worse    What it looks like:     Depression is starting to interfere with your life.     It may be hard to get out of bed; you may be starting to isolate yourself from others.    Symptoms of depression are starting to last most all day and this has happened for several days.     You may have suicidal thoughts but they are not constant.   What you need to do:     1. Call your care team, your response to treatment will improve if you keep your care team informed of your progress. Yellow periods are signs an adjustment may need to be made.     2. Continue your self-care, even if you have to fake it!    3. Talk to someone in your support network    4. Open up your depression survival kit           RED    ZONE Medical Alert - Get Help    What it looks like:     Depression is seriously interfering with your life.     You may experience these or other symptoms: You can t get out of bed most days, can t work or engage in other necessary activities, you have trouble taking care of basic hygiene, or basic responsibilities, thoughts of suicide or death that will not go away, self-injurious behavior.     What you need to do:  1. Call your care  team and request a same-day appointment. If they are not available (weekends or after hours) call your local crisis line, emergency room or 911.            Depression Self Care Plan / Survival Kit    Self-Care for Depression  Here s the deal. Your body and mind are really not as separate as most people think.  What you do and think affects how you feel and how you feel influences what you do and think. This means if you do things that people who feel good do, it will help you feel better.  Sometimes this is all it takes.  There is also a place for medication and therapy depending on how severe your depression is, so be sure to consult with your medical provider and/ or Behavioral Health Consultant if your symptoms are worsening or not improving.     In order to better manage my stress, I will:    Exercise  Get some form of exercise, every day. This will help reduce pain and release endorphins, the  feel good  chemicals in your brain. This is almost as good as taking antidepressants!  This is not the same as joining a gym and then never going! (they count on that by the way ) It can be as simple as just going for a walk or doing some gardening, anything that will get you moving.      Hygiene   Maintain good hygiene (Get out of bed in the morning, Make your bed, Brush your teeth, Take a shower, and Get dressed like you were going to work, even if you are unemployed).  If your clothes don't fit try to get ones that do.    Diet  I will strive to eat foods that are good for me, drink plenty of water, and avoid excessive sugar, caffeine, alcohol, and other mood-altering substances.  Some foods that are helpful in depression are: complex carbohydrates, B vitamins, flaxseed, fish or fish oil, fresh fruits and vegetables.    Psychotherapy  I agree to participate in Individual Therapy (if recommended).    Medication  If prescribed medications, I agree to take them.  Missing doses can result in serious side effects.  I  understand that drinking alcohol, or other illicit drug use, may cause potential side effects.  I will not stop my medication abruptly without first discussing it with my provider.    Staying Connected With Others  I will stay in touch with my friends, family members, and my primary care provider/team.    Use your imagination  Be creative.  We all have a creative side; it doesn t matter if it s oil painting, sand castles, or mud pies! This will also kick up the endorphins.    Witness Beauty  (AKA stop and smell the roses) Take a look outside, even in mid-winter. Notice colors, textures. Watch the squirrels and birds.     Service to others  Be of service to others.  There is always someone else in need.  By helping others we can  get out of ourselves  and remember the really important things.  This also provides opportunities for practicing all the other parts of the program.    Humor  Laugh and be silly!  Adjust your TV habits for less news and crime-drama and more comedy.    Control your stress  Try breathing deep, massage therapy, biofeedback, and meditation. Find time to relax each day.     My support system    Clinic Contact:  Phone number:    Contact 1:  Phone number:    Contact 2:  Phone number:    Anglican/:  Phone number:    Therapist:  Phone number:    Local crisis center:    Phone number:    Other community support:  Phone number:

## 2018-07-12 NOTE — LETTER
[address] [place] [address]    July 12, 2018    Sophie Felton  [address] [facility] [address]          Dear Sophie,  Thyroid is normal   Take care   Enclosed is a copy of your results.     Results for orders placed or performed in visit on 07/12/18   TSH with free T4 reflex   Result Value Ref Range    TSH 1.10 0.40 - 4.00 mU/L       If you have any questions or concerns, please call myself or my nurse at [phone].      Sincerely,        Giulia Godoy MD/pb

## 2018-07-12 NOTE — PATIENT INSTRUCTIONS

## 2018-07-12 NOTE — MR AVS SNAPSHOT
After Visit Summary   7/12/2018    Sophie Felton    MRN: [id number]           Patient Information     Date Of Birth          1990        Visit Information        Provider Department      7/12/2018 10:20 AM Giulia Godoy MD [facility]        Today's Diagnoses     Scalp psoriasis    -  1    Keratosis pilaris        Mild major depression (H)          Care Instructions      Preventive Health Recommendations  Female Ages 26 - 39  Yearly exam:   See your health care provider every year in order to    Review health changes.     Discuss preventive care.      Review your medicines if you your doctor has prescribed any.    Until age 30: Get a Pap test every three years (more often if you have had an abnormal result).    After age 30: Talk to your doctor about whether you should have a Pap test every 3 years or have a Pap test with HPV screening every 5 years.   You do not need a Pap test if your uterus was removed (hysterectomy) and you have not had cancer.  You should be tested each year for STDs (sexually transmitted diseases), if you're at risk.   Talk to your provider about how often to have your cholesterol checked.  If you are at risk for diabetes, you should have a diabetes test (fasting glucose).  Shots: Get a flu shot each year. Get a tetanus shot every 10 years.   Nutrition:     Eat at least 5 servings of fruits and vegetables each day.    Eat whole-grain bread, whole-wheat pasta and brown rice instead of white grains and rice.    Get adequate Calcium and Vitamin D.     Lifestyle    Exercise at least 150 minutes a week (30 minutes a day, 5 days of the week). This will help you control your weight and prevent disease.    Limit alcohol to one drink per day.    No smoking.     Wear sunscreen to prevent skin cancer.    See your dentist every six months for an exam and cleaning.      Discharge RAHUL Meredith  [facility]            Follow-ups after your visit        Additional Services      MENTAL HEALTH REFERRAL  - Adult; Assessments and Testing; General Psychological Testing; [facility]: [facility] [phone]; We will contact you to schedule the appointment or please call with any questions       All scheduling is subject to the client's specific insurance plan & benefits, provider/location availability, and provider clinical specialities.  Please arrive 15 minutes early for your first appointment and bring your completed paperwork.    Please be aware that coverage of these services is subject to the terms and limitations of your health insurance plan.  Call member services at your health plan with any benefit or coverage questions.                            Who to contact     If you have questions or need follow up information about today's clinic visit or your schedule please contact [facility] directly at [phone].  Normal or non-critical lab and imaging results will be communicated to you by MyChart, letter or phone within 4 business days after the clinic has received the results. If you do not hear from us within 7 days, please contact the clinic through [employer] or phone. If you have a critical or abnormal lab result, we will notify you by phone as soon as possible.  Submit refill requests through [employer] or call your pharmacy and they will forward the refill request to us. Please allow 3 business days for your refill to be completed.          Additional Information About Your Visit        [employer] Information     [employer] gives you secure access to your electronic health record. If you see a primary care provider, you can also send messages to your care team and make appointments. If you have questions, please call your primary care clinic.  If you do not have a primary care provider, please call [phone] and they will assist you.        Care EveryWhere ID     This is your Care EveryWhere ID. This could be used by other organizations to access your [place]  "medical records  [id number]        Your Vitals Were     Pulse Temperature Respirations Height Last Period Pulse Oximetry    69 98.1  F (36.7  C) (Oral) 12 5' 8.86\" (1.749 m) 06/16/2018 100%    BMI (Body Mass Index)                   25.56 kg/m2            Blood Pressure from Last 3 Encounters:   07/12/18 112/62   07/24/17 110/63   06/10/17 125/86    Weight from Last 3 Encounters:   07/12/18 172 lb 6.4 oz (78.2 kg)   07/24/17 190 lb (86.2 kg)   06/08/17 208 lb (94.3 kg)              We Performed the Following     MENTAL HEALTH REFERRAL  - Adult; Assessments and Testing; General Psychological Testing; [facility]: [facility] [phone]; We will contact you to schedule the appointment or please call with any questions     TSH with free T4 reflex     Vitamin D Deficiency          Today's Medication Changes          These changes are accurate as of 7/12/18 10:59 AM.  If you have any questions, ask your nurse or doctor.               Start taking these medicines.        Dose/Directions    ammonium lactate 12 % lotion   Commonly known as:  LAC-HYDRIN   Used for:  Keratosis pilaris   Started by:  Giulia Godoy MD        Apply topically 2 times daily as needed for dry skin   Quantity:  360 g   Refills:  3       escitalopram 20 MG tablet   Commonly known as:  LEXAPRO   Used for:  Mild major depression (H)   Started by:  Giulia Godoy MD        Take 1/2 tablet (10 mg) for 1-2 weeks, then increase to 1 tablet orally daily   Quantity:  30 tablet   Refills:  0       fluocinonide 0.05 % solution   Commonly known as:  LIDEX   Used for:  Scalp psoriasis   Started by:  Giulia Godoy MD        Apply sparingly to affected area twice daily as needed.  Do not apply to face.   Quantity:  60 mL   Refills:  0            Where to get your medicines      These medications were sent to [facility] - [address]  [address], [place] [address]     Phone:  [phone]     " ammonium lactate 12 % lotion    escitalopram 20 MG tablet    fluocinonide 0.05 % solution                Primary Care Provider Fax #    Physician No Ref-Primary [phone]       No address on file        Equal Access to Services     JOSHBIRAN RAMO : Sandra jeison villafana mike Galvin, wadinahda lularissa, qaybta kasrini garcias, nitza sotololy noreen. So [facility] [phone].    ATENCIÓN: Si habla español, tiene a bowman disposición servicios gratuitos de asistencia lingüística. Llame al [phone].    We comply with applicable federal civil rights laws and [place] laws. We do not discriminate on the basis of race, color, national origin, age, disability, sex, sexual orientation, or gender identity.            Thank you!     Thank you for choosing [facility]  for your care. Our goal is always to provide you with excellent care. Hearing back from our patients is one way we can continue to improve our services. Please take a few minutes to complete the written survey that you may receive in the mail after your visit with us. Thank you!             Your Updated Medication List - Protect others around you: Learn how to safely use, store and throw away your medicines at www.disposemymeds.org.          This list is accurate as of 7/12/18 10:59 AM.  Always use your most recent med list.                   Brand Name Dispense Instructions for use Diagnosis    ammonium lactate 12 % lotion    LAC-HYDRIN    360 g    Apply topically 2 times daily as needed for dry skin    Keratosis pilaris       COLACE PO           escitalopram 20 MG tablet    LEXAPRO    30 tablet    Take 1/2 tablet (10 mg) for 1-2 weeks, then increase to 1 tablet orally daily    Mild major depression (H)       fluocinonide 0.05 % solution    LIDEX    60 mL    Apply sparingly to affected area twice daily as needed.  Do not apply to face.    Scalp psoriasis       loratadine 10 MG tablet    CLARITIN     Take 20 mg by mouth daily         prenatal multivitamin plus iron 27-0.8 MG Tabs per tablet      Take 1 tablet by mouth daily        ranitidine 150 MG tablet    ZANTAC    60 tablet    Take 1 tablet (150 mg) by mouth 2 times daily    Heartburn during pregnancy in third trimester

## 2018-07-12 NOTE — PROGRESS NOTES
SUBJECTIVE:   CC: Sophie Felton is an 28 year old woman who presents for preventive health visit.     Physical   Annual:     Getting at least 3 servings of Calcium per day:  NO    Bi-annual eye exam:  NO    Dental care twice a year:  NO    Sleep apnea or symptoms of sleep apnea:  Daytime drowsiness    Diet:  Regular (no restrictions) and Breakfast skipped    Frequency of exercise:  None    Taking medications regularly:  Not Applicable    Additional concerns today:  YES        Abnormal Mood Symptoms  Onset: several months    Description:   Depression: YES  Anxiety: YES    Accompanying Signs & Symptoms:  Still participating in activities that you used to enjoy: no  Fatigue: YES  Irritability: YES  Difficulty concentrating: YES  Changes in appetite: YES  Problems with sleep: YES  Heart racing/beating fast : no   Thoughts of hurting yourself or others: none    History:   Recent stress: YES- father has colon cancer,financial Problems,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  Prior depression hospitalization: None  Family history of depression: YES  Family history of anxiety: YES    Precipitating factors:   Alcohol/drug use: no    Alleviating factors:  Brother has alcohol issues    Therapies Tried and outcome: Zoloft (Sertraline)  Rash  Onset: several years-was diagnosed with scalp psoriasis    Description:   Location: scalp,arms and legs  Character: scaley on scalp and small lesions arms and legs/both are Itchy  Itching (Pruritis): YES    Progression of Symptoms:  same    Accompanying Signs & Symptoms:  Fever: no   Body aches or joint pain: no   Sore throat symptoms: no   Recent cold symptoms: YES    History:   Previous similar rash: YES- saw Dermatologist and diagnosed as having scalp Psoriasis    Precipitating factors:   Exposure to similar rash: no   New exposures: None   Recent travel: no     Alleviating factors:      Today's PHQ-2 Score:   PHQ-2 ( 1999 Pfizer) 7/10/2018   Q1: Little interest or  pleasure in doing things 2   Q2: Feeling down, depressed or hopeless 2   PHQ-2 Score 4   Q1: Little interest or pleasure in doing things More than half the days   Q2: Feeling down, depressed or hopeless More than half the days   PHQ-2 Score 4       Abuse: Current or Past(Physical, Sexual or Emotional)- No  Do you feel safe in your environment - Yes    Social History   Substance Use Topics     Smoking status: Never Smoker     Smokeless tobacco: Never Used     Alcohol use No     Alcohol Use 7/10/2018   If you drink alcohol do you typically have greater than 3 drinks per day OR greater than 7 drinks per week? No       Reviewed orders with patient.  Reviewed health maintenance and updated orders accordingly - Yes  Labs reviewed in EPIC  BP Readings from Last 3 Encounters:   07/12/18 112/62   07/24/17 110/63   06/10/17 125/86    Wt Readings from Last 3 Encounters:   07/12/18 172 lb 6.4 oz (78.2 kg)   07/24/17 190 lb (86.2 kg)   06/08/17 208 lb (94.3 kg)                  Patient Active Problem List   Diagnosis     Seasonal allergic rhinitis     Past Surgical History:   Procedure Laterality Date     EYE SURGERY  2010    LASIK       Social History   Substance Use Topics     Smoking status: Never Smoker     Smokeless tobacco: Never Used     Alcohol use No     Family History   Problem Relation Age of Onset     Diabetes Brother      Substance Abuse Brother      Thyroid Disease Brother      Colon Cancer Father      age 62     Thyroid Disease Maternal Grandmother      Prostate Cancer Maternal Grandfather          Current Outpatient Prescriptions   Medication Sig Dispense Refill     ammonium lactate (LAC-HYDRIN) 12 % lotion Apply topically 2 times daily as needed for dry skin 360 g 3     escitalopram (LEXAPRO) 20 MG tablet Take 1/2 tablet (10 mg) for 1-2 weeks, then increase to 1 tablet orally daily 30 tablet 0     fluocinonide (LIDEX) 0.05 % solution Apply sparingly to affected area twice daily as needed.  Do not apply to face.  "60 mL 0     loratadine (CLARITIN) 10 MG tablet Take 20 mg by mouth daily       Docusate Sodium (COLACE PO)        Prenatal Vit-Fe Fumarate-FA (PRENATAL MULTIVITAMIN  PLUS IRON) 27-0.8 MG TABS per tablet Take 1 tablet by mouth daily       ranitidine (ZANTAC) 150 MG tablet Take 1 tablet (150 mg) by mouth 2 times daily (Patient not taking: Reported on 7/24/2017) 60 tablet 1     Allergies   Allergen Reactions     Septra [Sulfamethoxazole W/Trimethoprim]      Sulfamethoxazole-Trimethoprim Hives     As a child     No lab results found.     Mammo discussed, not appropriate for or declined by this patient.    Pertinent mammograms are reviewed under the imaging tab.  History of abnormal Pap smear: NO - age 30- 65 PAP every 3 years recommended  PAP / HPV 10/27/2016   PAP NIL     Reviewed and updated as needed this visit by clinical staff  Tobacco  Allergies  Meds         Reviewed and updated as needed this visit by Provider        Past Medical History:   Diagnosis Date     Depressive disorder Age 15    Anxiety and depression has worsened since swcond birth      Past Surgical History:   Procedure Laterality Date     EYE SURGERY  2010    LASIK       Review of Systems  CONSTITUTIONAL: NEGATIVE for fever, chills, change in weight  INTEGUMENTARY/SKIN: as above  EYES: NEGATIVE for vision changes or irritation  ENT: NEGATIVE for ear, mouth and throat problems  RESP: NEGATIVE for significant cough or SOB  BREAST: NEGATIVE for masses, tenderness or discharge  CV: NEGATIVE for chest pain, palpitations or peripheral edema  GI: NEGATIVE for nausea, abdominal pain, heartburn, or change in bowel habits  : NEGATIVE for unusual urinary or vaginal symptoms. Periods are regular.  MUSCULOSKELETAL: NEGATIVE for significant arthralgias or myalgia  NEURO: NEGATIVE for weakness, dizziness or paresthesias  PSYCHIATRIC: as above     OBJECTIVE:   /62  Pulse 69  Temp 98.1  F (36.7  C) (Oral)  Resp 12  Ht 5' 8.86\" (1.749 m)  Wt 172 lb " 6.4 oz (78.2 kg)  LMP 06/16/2018  SpO2 100%  BMI 25.56 kg/m2  Physical Exam  GENERAL: healthy, alert and no distress  EYES: Eyes grossly normal to inspection, PERRL and conjunctivae and sclerae normal  HENT: ear canals and TM's normal, nose and mouth without ulcers or lesions  NECK: no adenopathy, no asymmetry, masses, or scars and thyroid normal to palpation  RESP: lungs clear to auscultation - no rales, rhonchi or wheezes  BREAST: normal without masses, tenderness or nipple discharge and no palpable axillary masses or adenopathy  CV: regular rate and rhythm, normal S1 S2, no S3 or S4, no murmur, click or rub, no peripheral edema and peripheral pulses strong  ABDOMEN: soft, nontender, no hepatosplenomegaly, no masses and bowel sounds normal  MS: no gross musculoskeletal defects noted, no edema  SKIN: mild sclaing seen on scalp  Small Pinpoint  Raised lesions arms and legs  NEURO: Normal strength and tone, mentation intact and speech normal  PSYCH: tearful and anxious  LYMPH: no cervical, supraclavicular, axillary, or inguinal adenopathy    Diagnostic Test Results:  Pending     ASSESSMENT/PLAN:   1. Encounter for routine adult physical exam with abnormal findings      2. Scalp psoriasis  Advised use T gel Neutrogena shampoo  - fluocinonide (LIDEX) 0.05 % solution; Apply sparingly to affected area twice daily as needed.  Do not apply to face.  Dispense: 60 mL; Refill: 0  - OFFICE/OUTPT VISIT,EST,LEVL III  Follow up  Dermatology if not better    3. Keratosis pilaris    - ammonium lactate (LAC-HYDRIN) 12 % lotion; Apply topically 2 times daily as needed for dry skin  Dispense: 360 g; Refill: 3  - OFFICE/OUTPT VISIT,EST,LEVL III    4. Mild major depression (H)  Discussed meds  I've explained to her that drugs of the SSRI class can have side effects such as weight gain, sexual dysfunction, insomnia, headache, nausea, suicide . These medications are generally effective at alleviating symptoms of anxiety and/or  "depression. Let me know if significant side effects do occur.    - escitalopram (LEXAPRO) 20 MG tablet; Take 1/2 tablet (10 mg) for 1-2 weeks, then increase to 1 tablet orally daily  Dispense: 30 tablet; Refill: 0  - TSH with free T4 reflex  - Vitamin D Deficiency  - MENTAL HEALTH REFERRAL  - Adult; Assessments and Testing; General Psychological Testing; G: [facility] [phone]; We will contact you to schedule the appointment or please call with any questions  - OFFICE/OUTPT VISIT,NEHEMIAS CRAWFORD III  Follow up 3 weeks  See conselor  COUNSELING:  Reviewed preventive health counseling, as reflected in patient instructions       Regular exercise       Healthy diet/nutrition       Contraception       Colon cancer screening    BP Readings from Last 1 Encounters:   07/12/18 112/62     Estimated body mass index is 25.56 kg/(m^2) as calculated from the following:    Height as of this encounter: 5' 8.86\" (1.749 m).    Weight as of this encounter: 172 lb 6.4 oz (78.2 kg).           reports that she has never smoked. She has never used smokeless tobacco.      Counseling Resources:  ATP IV Guidelines  Pooled Cohorts Equation Calculator  Breast Cancer Risk Calculator  FRAX Risk Assessment  ICSI Preventive Guidelines  Dietary Guidelines for Americans, 2010  USDA's MyPlate  ASA Prophylaxis  Lung CA Screening    Giulia Godoy MD  [facility]  "

## 2018-07-16 LAB — DEPRECATED CALCIDIOL+CALCIFEROL SERPL-MC: 28 UG/L (ref 20–75)

## 2018-08-07 ENCOUNTER — OFFICE VISIT (OUTPATIENT)
Dept: FAMILY MEDICINE | Facility: CLINIC | Age: 28
End: 2018-08-07
Payer: COMMERCIAL

## 2018-08-07 VITALS
OXYGEN SATURATION: 99 % | HEART RATE: 83 BPM | WEIGHT: 171.2 LBS | TEMPERATURE: 98.1 F | RESPIRATION RATE: 16 BRPM | BODY MASS INDEX: 25.36 KG/M2 | HEIGHT: 69 IN | DIASTOLIC BLOOD PRESSURE: 60 MMHG | SYSTOLIC BLOOD PRESSURE: 116 MMHG

## 2018-08-07 DIAGNOSIS — F32.0 MILD MAJOR DEPRESSION (H): ICD-10-CM

## 2018-08-07 DIAGNOSIS — F41.9 ANXIETY: Primary | ICD-10-CM

## 2018-08-07 PROCEDURE — 99213 OFFICE O/P EST LOW 20 MIN: CPT | Performed by: FAMILY MEDICINE

## 2018-08-07 RX ORDER — ESCITALOPRAM OXALATE 20 MG/1
TABLET ORAL
Qty: 30 TABLET | Refills: 0 | Status: SHIPPED | OUTPATIENT
Start: 2018-08-07 | End: 2018-09-17

## 2018-08-07 RX ORDER — BUSPIRONE HYDROCHLORIDE 5 MG/1
TABLET ORAL
Qty: 150 TABLET | Refills: 0 | Status: SHIPPED | OUTPATIENT
Start: 2018-08-07 | End: 2018-09-17

## 2018-08-07 ASSESSMENT — ANXIETY QUESTIONNAIRES
IF YOU CHECKED OFF ANY PROBLEMS ON THIS QUESTIONNAIRE, HOW DIFFICULT HAVE THESE PROBLEMS MADE IT FOR YOU TO DO YOUR WORK, TAKE CARE OF THINGS AT HOME, OR GET ALONG WITH OTHER PEOPLE: SOMEWHAT DIFFICULT
3. WORRYING TOO MUCH ABOUT DIFFERENT THINGS: MORE THAN HALF THE DAYS
GAD7 TOTAL SCORE: 13
2. NOT BEING ABLE TO STOP OR CONTROL WORRYING: MORE THAN HALF THE DAYS
5. BEING SO RESTLESS THAT IT IS HARD TO SIT STILL: SEVERAL DAYS
1. FEELING NERVOUS, ANXIOUS, OR ON EDGE: NEARLY EVERY DAY
7. FEELING AFRAID AS IF SOMETHING AWFUL MIGHT HAPPEN: MORE THAN HALF THE DAYS
6. BECOMING EASILY ANNOYED OR IRRITABLE: MORE THAN HALF THE DAYS

## 2018-08-07 ASSESSMENT — PAIN SCALES - GENERAL: PAINLEVEL: NO PAIN (0)

## 2018-08-07 ASSESSMENT — PATIENT HEALTH QUESTIONNAIRE - PHQ9: 5. POOR APPETITE OR OVEREATING: SEVERAL DAYS

## 2018-08-07 NOTE — MR AVS SNAPSHOT
After Visit Summary   8/7/2018    Sophie Felton    MRN: [id number]           Patient Information     Date Of Birth          1990        Visit Information        Provider Department      8/7/2018 11:30 AM Giulia Godoy MD [facility]        Today's Diagnoses     Anxiety    -  1    Mild major depression (H)          Care Instructions    [facility]    If you have any questions regarding to your visit please contact your care team:       Team Red:   Clinic Hours Telephone Number   Dr. Shelley Dennis, NP   7am-7pm  Monday - Thursday   7am-5pm  Fridays  [phone]  (Appointment scheduling available 24/7)    Questions about your recent visit?   Team Line  [phone]   Urgent Care - [place] and [facility] - 11am-9pm Monday-Friday Saturday-Sunday- 9am-5pm   [place] - 5pm-9pm Monday-Friday Saturday-Sunday- 9am-5pm  [phone] - [place]  [phone] - [place]       What options do I have for a visit other than an office visit? We offer electronic visits (e-visits) and telephone visits, when medically appropriate.  Please check with your medical insurance to see if these types of visits are covered, as you will be responsible for any charges that are not paid by your insurance.      You can use MemSQL (secure electronic communication) to access to your chart, send your primary care provider a message, or make an appointment. Ask a team member how to get started.     For a price quote for your services, please call our Consumer Price Line at [phone] or our Imaging Cost estimation line at [phone] (for imaging tests).    Eugenia Cohen CMA             Follow-ups after your visit        Additional Services     MENTAL HEALTH REFERRAL  - Adult; Psychiatry and Medication Management; Psychiatry; Other: [facility] [phone]; We will contact you to schedule the  "appointment or please call with any questions       All scheduling is subject to the client's specific insurance plan & benefits, provider/location availability, and provider clinical specialities.  Please arrive 15 minutes early for your first appointment and bring your completed paperwork.    Please be aware that coverage of these services is subject to the terms and limitations of your health insurance plan.  Call member services at your health plan with any benefit or coverage questions.                            Who to contact     If you have questions or need follow up information about today's clinic visit or your schedule please contact [facility] directly at [phone].  Normal or non-critical lab and imaging results will be communicated to you by [employer], letter or phone within 4 business days after the clinic has received the results. If you do not hear from us within 7 days, please contact the clinic through [employer] or phone. If you have a critical or abnormal lab result, we will notify you by phone as soon as possible.  Submit refill requests through N[employer] or call your pharmacy and they will forward the refill request to us. Please allow 3 business days for your refill to be completed.          Additional Information About Your Visit        MyChart Information     [employer] gives you secure access to your electronic health record. If you see a primary care provider, you can also send messages to your care team and make appointments. If you have questions, please call your primary care clinic.  If you do not have a primary care provider, please call [phone] and they will assist you.        Care EveryWhere ID     This is your Care EveryWhere ID. This could be used by other organizations to access your [facility] medical records  [id number]        Your Vitals Were     Pulse Temperature Respirations Height Last Period Pulse Oximetry    83 98.1  F (36.7  C) (Oral) 16 5' 8.8\" (1.748 m) " 07/16/2018 (Approximate) 99%    BMI (Body Mass Index)                   25.43 kg/m2            Blood Pressure from Last 3 Encounters:   08/07/18 116/60   07/12/18 112/62   07/24/17 110/63    Weight from Last 3 Encounters:   08/07/18 171 lb 3.2 oz (77.7 kg)   07/12/18 172 lb 6.4 oz (78.2 kg)   07/24/17 190 lb (86.2 kg)              We Performed the Following     MENTAL HEALTH REFERRAL  - Adult; Psychiatry and Medication Management; Psychiatry; Other: [facility] [phone]; We will contact you to schedule the appointment or please call with any questions          Today's Medication Changes          These changes are accurate as of 8/7/18 12:04 PM.  If you have any questions, ask your nurse or doctor.               Start taking these medicines.        Dose/Directions    busPIRone 5 MG tablet   Commonly known as:  BUSPAR   Used for:  Anxiety   Started by:  Giulia Godoy MD        Start at 5 mg twice daily for 3 days, then 7.5 mg (1.5 tabs) twice daily for 3 days, then 10 mg (2 tabs) twice daily for 3 days, then 12.5 mg (2.5 tabs) twice daily for 3 days, then 15 mg (3 tabs) twice daily and stay at that dose   Quantity:  150 tablet   Refills:  0         These medicines have changed or have updated prescriptions.        Dose/Directions    escitalopram 20 MG tablet   Commonly known as:  LEXAPRO   This may have changed:  additional instructions   Used for:  Mild major depression (H)   Changed by:  Giulia Godoy MD        20 mg daily   Quantity:  30 tablet   Refills:  0            Where to get your medicines      These medications were sent to [facility] - [address]  [address], [place] [address]     Phone:  [phone]     escitalopram 20 MG tablet         Some of these will need a paper prescription and others can be bought over the counter.  Ask your nurse if you have questions.     Bring a paper prescription for each of these  medications     busPIRone 5 MG tablet                Primary Care Provider Office Phone # Fax #    Giulia Godoy [phone] [address] [address]        Equal Access to Services     BERT RALPH : Hadbabar mchugh ku hardiko Sostewali, waaxda luqadaha, qaybta kaalmada adeegyada, nitza ariasn ariel cano laNicolejena sorto. So [facility] [phone].    ATENCIÓN: Si habla español, tiene a bowman disposición servicios gratuitos de asistencia lingüística. Llame al [phone].    We comply with applicable federal civil rights laws and [place] laws. We do not discriminate on the basis of race, color, national origin, age, disability, sex, sexual orientation, or gender identity.            Thank you!     Thank you for choosing [facility]  for your care. Our goal is always to provide you with excellent care. Hearing back from our patients is one way we can continue to improve our services. Please take a few minutes to complete the written survey that you may receive in the mail after your visit with us. Thank you!             Your Updated Medication List - Protect others around you: Learn how to safely use, store and throw away your medicines at www.disposemymeds.org.          This list is accurate as of 8/7/18 12:04 PM.  Always use your most recent med list.                   Brand Name Dispense Instructions for use Diagnosis    ammonium lactate 12 % lotion    LAC-HYDRIN    360 g    Apply topically 2 times daily as needed for dry skin    Keratosis pilaris       busPIRone 5 MG tablet    BUSPAR    150 tablet    Start at 5 mg twice daily for 3 days, then 7.5 mg (1.5 tabs) twice daily for 3 days, then 10 mg (2 tabs) twice daily for 3 days, then 12.5 mg (2.5 tabs) twice daily for 3 days, then 15 mg (3 tabs) twice daily and stay at that dose    Anxiety       COLACE PO           escitalopram 20 MG tablet    LEXAPRO    30 tablet    20 mg daily    Mild major depression (H)        fluocinonide 0.05 % solution    LIDEX    60 mL    Apply sparingly to affected area twice daily as needed.  Do not apply to face.    Scalp psoriasis       loratadine 10 MG tablet    CLARITIN     Take 20 mg by mouth daily        prenatal multivitamin plus iron 27-0.8 MG Tabs per tablet      Take 1 tablet by mouth daily        ranitidine 150 MG tablet    ZANTAC    60 tablet    Take 1 tablet (150 mg) by mouth 2 times daily    Heartburn during pregnancy in third trimester

## 2018-08-07 NOTE — PROGRESS NOTES
SUBJECTIVE:   Sophie Felton is a 28 year old female who presents to clinic today for the following health issues:        Medication Followup of Lexapro 20 mg    Taking Medication as prescribed: yes    Side Effects:  None    Medication Helping Symptoms:  yes       Depression and Anxiety Follow-Up    Status since last visit: Depression is better but still very anxious    Works as a Nurse    Has 2 Kids     stays at Home    Financial Pressures    Father is sick    Other associated symptoms:None    Complicating factors:     Significant life event: Yes-  As above     Current substance abuse: None    PHQ-9 7/24/2017 7/10/2018 8/7/2018   Total Score 13 7 5   Q9: Suicide Ideation Not at all Not at all Not at all     GRETA-7 SCORE 8/7/2018   Total Score 13     In the past two weeks have you had thoughts of suicide or self-harm?  No.    Do you have concerns about your personal safety or the safety of others?   No  PHQ-9  English  PHQ-9   Any Language  GRETA-7  Suicide Assessment Five-step Evaluation and Treatment (SAFE-T)    Problem list and histories reviewed & adjusted, as indicated.  Additional history: as documented    Patient Active Problem List   Diagnosis     Seasonal allergic rhinitis     Scalp psoriasis     Keratosis pilaris     Mild major depression (H)     Family history of colon cancer     Past Surgical History:   Procedure Laterality Date     EYE SURGERY  2010    LASIK       Social History   Substance Use Topics     Smoking status: Never Smoker     Smokeless tobacco: Never Used     Alcohol use No     Family History   Problem Relation Age of Onset     Diabetes Brother      Substance Abuse Brother      Thyroid Disease Brother      Colon Cancer Father      age 62     Thyroid Disease Maternal Grandmother      Prostate Cancer Maternal Grandfather          Current Outpatient Prescriptions   Medication Sig Dispense Refill     ammonium lactate (LAC-HYDRIN) 12 % lotion Apply topically 2 times daily as needed for  "dry skin 360 g 3     busPIRone (BUSPAR) 5 MG tablet Start at 5 mg twice daily for 3 days, then 7.5 mg (1.5 tabs) twice daily for 3 days, then 10 mg (2 tabs) twice daily for 3 days, then 12.5 mg (2.5 tabs) twice daily for 3 days, then 15 mg (3 tabs) twice daily and stay at that dose 150 tablet 0     Docusate Sodium (COLACE PO)        escitalopram (LEXAPRO) 20 MG tablet 20 mg daily 30 tablet 0     fluocinonide (LIDEX) 0.05 % solution Apply sparingly to affected area twice daily as needed.  Do not apply to face. 60 mL 0     loratadine (CLARITIN) 10 MG tablet Take 20 mg by mouth daily       Prenatal Vit-Fe Fumarate-FA (PRENATAL MULTIVITAMIN  PLUS IRON) 27-0.8 MG TABS per tablet Take 1 tablet by mouth daily       ranitidine (ZANTAC) 150 MG tablet Take 1 tablet (150 mg) by mouth 2 times daily (Patient not taking: Reported on 7/24/2017) 60 tablet 1     [DISCONTINUED] escitalopram (LEXAPRO) 20 MG tablet Take 1/2 tablet (10 mg) for 1-2 weeks, then increase to 1 tablet orally daily 30 tablet 0     Allergies   Allergen Reactions     Septra [Sulfamethoxazole W/Trimethoprim]      Sulfamethoxazole-Trimethoprim Hives     As a child     Recent Labs   Lab Test  07/12/18   1102   TSH  1.10      BP Readings from Last 3 Encounters:   08/07/18 116/60   07/12/18 112/62   07/24/17 110/63    Wt Readings from Last 3 Encounters:   08/07/18 171 lb 3.2 oz (77.7 kg)   07/12/18 172 lb 6.4 oz (78.2 kg)   07/24/17 190 lb (86.2 kg)                  Labs reviewed in EPIC    Reviewed and updated as needed this visit by clinical staff       Reviewed and updated as needed this visit by Provider         ROS:  Rest of the pertinent  ROS is Negative except see above and Problem list [stable]      OBJECTIVE:     /60  Pulse 83  Temp 98.1  F (36.7  C) (Oral)  Resp 16  Ht 5' 8.8\" (1.748 m)  Wt 171 lb 3.2 oz (77.7 kg)  LMP 07/16/2018 (Approximate)  SpO2 99%  BMI 25.43 kg/m2  Body mass index is 25.43 kg/(m^2).  GENERAL: healthy, alert and no " distress  PSYCH: tearful and anxious    Diagnostic Test Results:  none     ASSESSMENT/PLAN:         ICD-10-CM    1. Anxiety F41.9 busPIRone (BUSPAR) 5 MG tablet     MENTAL HEALTH REFERRAL  - Adult; Psychiatry and Medication Management; Psychiatry; Other: [facility] [phone]; We will contact you to schedule the appointment or please call with any questions   2. Mild major depression (H) F32.0 escitalopram (LEXAPRO) 20 MG tablet     MENTAL HEALTH REFERRAL  - Adult; Psychiatry and Medication Management; Psychiatry; Other: [facility] [phone]; We will contact you to schedule the appointment or please call with any questions     SEE EPIC care orders  The potential side effects of this medication have been discussed with the patient.  Call if any significant problems with these are experienced.  Consider seeing Psych  Consider counseling-Pt has declined this  Follow up 6 weeks  Giulia Godoy MD  [facility]

## 2018-08-07 NOTE — PATIENT INSTRUCTIONS
[facility]    If you have any questions regarding to your visit please contact your care team:       Team Red:   Clinic Hours Telephone Number   Dr. Shelley Dennis, NP   7am-7pm  Monday - Thursday   7am-5pm  Fridays  [phone]  (Appointment scheduling available 24/7)    Questions about your recent visit?   Team Line  [phone]   Urgent Care - [place] and [facility] - 11am-9pm Monday-Friday Saturday-Sunday- 9am-5pm   [place] - 5pm-9pm Monday-Friday Saturday-Sunday- 9am-5pm  [phone] - [place]  [phone] - [place]       What options do I have for a visit other than an office visit? We offer electronic visits (e-visits) and telephone visits, when medically appropriate.  Please check with your medical insurance to see if these types of visits are covered, as you will be responsible for any charges that are not paid by your insurance.      You can use [employer]Now (secure electronic communication) to access to your chart, send your primary care provider a message, or make an appointment. Ask a team member how to get started.     For a price quote for your services, please call our Consumer Price Line at [phone] or our Imaging Cost estimation line at [phone] (for imaging tests).    Eugenia Cohen CMA

## 2018-08-08 ASSESSMENT — ANXIETY QUESTIONNAIRES: GAD7 TOTAL SCORE: 13

## 2018-08-08 ASSESSMENT — PATIENT HEALTH QUESTIONNAIRE - PHQ9: SUM OF ALL RESPONSES TO PHQ QUESTIONS 1-9: 5

## 2018-09-17 ENCOUNTER — OFFICE VISIT (OUTPATIENT)
Dept: FAMILY MEDICINE | Facility: CLINIC | Age: 28
End: 2018-09-17
Payer: COMMERCIAL

## 2018-09-17 VITALS
SYSTOLIC BLOOD PRESSURE: 110 MMHG | RESPIRATION RATE: 16 BRPM | TEMPERATURE: 98.1 F | WEIGHT: 173.6 LBS | HEIGHT: 69 IN | BODY MASS INDEX: 25.71 KG/M2 | DIASTOLIC BLOOD PRESSURE: 60 MMHG | OXYGEN SATURATION: 99 % | HEART RATE: 72 BPM

## 2018-09-17 DIAGNOSIS — F43.21 GRIEF: ICD-10-CM

## 2018-09-17 DIAGNOSIS — Z32.01 PREGNANCY TEST POSITIVE: ICD-10-CM

## 2018-09-17 DIAGNOSIS — F41.9 ANXIETY: Primary | ICD-10-CM

## 2018-09-17 DIAGNOSIS — F32.0 MILD MAJOR DEPRESSION (H): ICD-10-CM

## 2018-09-17 LAB — BETA HCG QUAL IFA URINE: POSITIVE

## 2018-09-17 PROCEDURE — 99214 OFFICE O/P EST MOD 30 MIN: CPT | Performed by: FAMILY MEDICINE

## 2018-09-17 PROCEDURE — 84703 CHORIONIC GONADOTROPIN ASSAY: CPT | Performed by: FAMILY MEDICINE

## 2018-09-17 RX ORDER — ESCITALOPRAM OXALATE 20 MG/1
TABLET ORAL
Qty: 30 TABLET | Refills: 0 | Status: SHIPPED | OUTPATIENT
Start: 2018-09-17 | End: 2018-10-14

## 2018-09-17 ASSESSMENT — ANXIETY QUESTIONNAIRES
2. NOT BEING ABLE TO STOP OR CONTROL WORRYING: MORE THAN HALF THE DAYS
IF YOU CHECKED OFF ANY PROBLEMS ON THIS QUESTIONNAIRE, HOW DIFFICULT HAVE THESE PROBLEMS MADE IT FOR YOU TO DO YOUR WORK, TAKE CARE OF THINGS AT HOME, OR GET ALONG WITH OTHER PEOPLE: SOMEWHAT DIFFICULT
5. BEING SO RESTLESS THAT IT IS HARD TO SIT STILL: SEVERAL DAYS
3. WORRYING TOO MUCH ABOUT DIFFERENT THINGS: SEVERAL DAYS
1. FEELING NERVOUS, ANXIOUS, OR ON EDGE: MORE THAN HALF THE DAYS
6. BECOMING EASILY ANNOYED OR IRRITABLE: MORE THAN HALF THE DAYS

## 2018-09-17 ASSESSMENT — PATIENT HEALTH QUESTIONNAIRE - PHQ9: 5. POOR APPETITE OR OVEREATING: SEVERAL DAYS

## 2018-09-17 NOTE — PROGRESS NOTES
SUBJECTIVE:   Sophie Felton is a 28 year old female who presents to clinic today for the following health issues:      Depression and Anxiety Follow-Up    Status since last visit: Improved     Other associated symptoms:None    Significant life event: Yes-  Expecting 3rd child, 9/1/18 and father has passed away    Current substance abuse: None    PHQ-9 7/10/2018 8/7/2018 9/17/2018   Total Score 7 5 3   Q9: Suicide Ideation Not at all Not at all Not at all     GRETA-7 SCORE 8/7/2018   Total Score 13   She feels better  She Lot her father a few weeks ago from Colon cancer  Pt also Found out she is Pregnant  LMP is July 16  She is a G3  She has some nausea  .  PHQ-9  English  PHQ-9   Any Language  GRETA-7  Suicide Assessment Five-step Evaluation and Treatment (SAFE-T)    Amount of exercise or physical activity: None    Problems taking medications regularly: yes, not very good at taking evening dose of Buspar, almost out, but since becoming pregnant would like to know if ok to continue taking.    Medication side effects: no    Diet: regular (no restrictions)      1.) If possible patient would like to know if a doppler could be used to listen to baby's heartbeat.      Problem list and histories reviewed & adjusted, as indicated.  Additional history: as documented    Patient Active Problem List   Diagnosis     Seasonal allergic rhinitis     Scalp psoriasis     Keratosis pilaris     Mild major depression (H)     Family history of colon cancer     Past Surgical History:   Procedure Laterality Date     EYE SURGERY  2010    LASIK       Social History   Substance Use Topics     Smoking status: Never Smoker     Smokeless tobacco: Never Used     Alcohol use No     Family History   Problem Relation Age of Onset     Diabetes Brother      Substance Abuse Brother      Thyroid Disease Brother      Colon Cancer Father      age 62     Thyroid Disease Maternal Grandmother      Prostate Cancer Maternal Grandfather          Current  "Outpatient Prescriptions   Medication Sig Dispense Refill     ammonium lactate (LAC-HYDRIN) 12 % lotion Apply topically 2 times daily as needed for dry skin 360 g 3     escitalopram (LEXAPRO) 20 MG tablet 20 mg daily 30 tablet 0     fluocinonide (LIDEX) 0.05 % solution Apply sparingly to affected area twice daily as needed.  Do not apply to face. 60 mL 0     loratadine (CLARITIN) 10 MG tablet Take 20 mg by mouth daily       Prenatal Vit-Fe Fumarate-FA (PRENATAL MULTIVITAMIN  PLUS IRON) 27-0.8 MG TABS per tablet Take 1 tablet by mouth daily       Docusate Sodium (COLACE PO)        [DISCONTINUED] escitalopram (LEXAPRO) 20 MG tablet 20 mg daily 30 tablet 0     Allergies   Allergen Reactions     Septra [Sulfamethoxazole W/Trimethoprim]      Sulfamethoxazole-Trimethoprim Hives     As a child     Recent Labs   Lab Test  07/12/18   1102   TSH  1.10      BP Readings from Last 3 Encounters:   09/17/18 110/60   08/07/18 116/60   07/12/18 112/62    Wt Readings from Last 3 Encounters:   09/17/18 173 lb 9.6 oz (78.7 kg)   08/07/18 171 lb 3.2 oz (77.7 kg)   07/12/18 172 lb 6.4 oz (78.2 kg)                  Labs reviewed in EPIC    Reviewed and updated as needed this visit by clinical staff  Tobacco  Allergies  Meds  Problems  Med Hx  Surg Hx  Fam Hx  Soc Hx        Reviewed and updated as needed this visit by Provider  Allergies  Meds  Problems         ROS:  CONSTITUTIONAL: NEGATIVE for fever, chills, change in weight  ENT/MOUTH: NEGATIVE for ear, mouth and throat problems  RESP: NEGATIVE for significant cough or SOB  CV: NEGATIVE for chest pain, palpitations or peripheral edema  GI: NEGATIVE for nausea, abdominal pain, heartburn, or change in bowel habits  MUSCULOSKELETAL: NEGATIVE for significant arthralgias or myalgia    OBJECTIVE:     /60  Pulse 72  Temp 98.1  F (36.7  C) (Oral)  Resp 16  Ht 5' 8.8\" (1.748 m)  Wt 173 lb 9.6 oz (78.7 kg)  LMP 07/16/2018 (Exact Date)  SpO2 99%  Breastfeeding? No  BMI " 25.79 kg/m2  Body mass index is 25.79 kg/(m^2).  GENERAL: healthy, alert and no distress  NECK: no adenopathy, no asymmetry, masses, or scars and thyroid normal to palpation  RESP: lungs clear to auscultation - no rales, rhonchi or wheezes  CV: regular rate and rhythm, normal S1 S2, no S3 or S4, no murmur, click or rub, no peripheral edema and peripheral pulses strong  ABDOMEN: soft, nontender, no hepatosplenomegaly, no masses and bowel sounds normal  MS: no gross musculoskeletal defects noted, no edema  PSYCH: mentation appears normal, affect normal/bright    Diagnostic Test Results:  Pregnancy test     ASSESSMENT/PLAN:       ICD-10-CM    1. Anxiety F41.9 MENTAL HEALTH REFERRAL  - Adult; Psychiatry and Medication Management; Psychiatry; Other: [facility] [phone]; We will contact you to schedule the appointment or please call with any questions     MENTAL HEALTH REFERRAL  - Adult; Assessments and Testing; General Psychological Testing; Other: [facility] [phone]; We will contact you to schedule the appointment or please call with any questions   2. Mild major depression (H) F32.0 MENTAL HEALTH REFERRAL  - Adult; Psychiatry and Medication Management; Psychiatry; Other: [facility] [phone]; We will contact you to schedule the appointment or please call with any questions     MENTAL HEALTH REFERRAL  - Adult; Assessments and Testing; General Psychological Testing; Other: [facility] [phone]; We will contact you to schedule the appointment or please call with any questions     escitalopram (LEXAPRO) 20 MG tablet   3. Pregnancy test positive Z32.01 MENTAL HEALTH REFERRAL  - Adult; Psychiatry and Medication Management; Psychiatry; Other: [facility] [phone]; We will contact you to schedule the appointment or please call with any questions     MENTAL HEALTH REFERRAL  - Adult; Assessments  and Testing; General Psychological Testing; Other: [facility] [phone]; We will contact you to schedule the appointment or please call with any questions     Beta HCG Qual, Urine - [facility] and [place] ([id number])     OB/GYN REFERRAL   4. Grief F43.20      Pt will wean off Buspar  She will continue Lexapro Till she sees psych  Make appointment for first bB   Advised PNV daily  Giulia Godoy MD  [facility]

## 2018-09-17 NOTE — MR AVS SNAPSHOT
After Visit Summary   9/17/2018    Sophie Felton    MRN: [id number]           Patient Information     Date Of Birth          1990        Visit Information        Provider Department      9/17/2018 7:40 AM Giulia Godoy MD [facility]        Today's Diagnoses     Anxiety    -  1    Mild major depression (H)        Pregnancy test positive          Care Instructions    [facility]    If you have any questions regarding to your visit please contact your care team:       Team Red:   Clinic Hours Telephone Number   Dr. Shelley Dennis, NP 7am-7pm  Monday - Thursday   7am-5pm  Fridays  [phone]  (Appointment scheduling available 24/7)   Urgent Care - [place] and [facility] - 11am-9pm Monday-Friday Saturday-Sunday- 9am-5pm   [place] - 5pm-9pm Monday-Friday Saturday-Sunday- 9am-5pm  [phone] - [place]  [phone] - [place]       What options do I have for a visit other than an office visit? We offer electronic visits (e-visits) and telephone visits, when medically appropriate.  Please check with your medical insurance to see if these types of visits are covered, as you will be responsible for any charges that are not paid by your insurance.      You can use [employer] (secure electronic communication) to access to your chart, send your primary care provider a message, or make an appointment. Ask a team member how to get started.     For a price quote for your services, please call our Consumer Price Line at [phone] or our Imaging Cost estimation line at [phone] (for imaging tests).    RIGOBERTO Toribio CMA ([facility])          Follow-ups after your visit        Additional Services     MENTAL HEALTH REFERRAL  - Adult; Assessments and Testing; General Psychological Testing; Other: [facility] [phone]; We will contact you to schedule the appointment or please call with any  questions       All scheduling is subject to the client's specific insurance plan & benefits, provider/location availability, and provider clinical specialities.  Please arrive 15 minutes early for your first appointment and bring your completed paperwork.    Please be aware that coverage of these services is subject to the terms and limitations of your health insurance plan.  Call member services at your health plan with any benefit or coverage questions.                      MENTAL HEALTH REFERRAL  - Adult; Psychiatry and Medication Management; Psychiatry; Other: [facility] [phone]; We will contact you to schedule the appointment or please call with any questions       All scheduling is subject to the client's specific insurance plan & benefits, provider/location availability, and provider clinical specialities.  Please arrive 15 minutes early for your first appointment and bring your completed paperwork.    Please be aware that coverage of these services is subject to the terms and limitations of your health insurance plan.  Call member services at your health plan with any benefit or coverage questions.                      OB/GYN REFERRAL       Your provider has referred you to:  [facility]: [facility] [phone]   [email or web address]    Please be aware that coverage of these services is subject to the terms and limitations of your health insurance plan.  Call member services at your health plan with any benefit or coverage questions.      Please bring the following to your appointment:  >>   Any x-rays, CTs or MRIs which have been performed.  Contact the facility where they were done to arrange for  prior to your scheduled appointment.  Any new CT, MRI or other procedures ordered by your specialist must be performed at a [facility] or coordinated by your clinic's referral office.    >>   List of current medications  "  >>   This referral request   >>   Any documents/labs given to you for this referral                  Follow-up notes from your care team     Return for First OB appointment.      Who to contact     If you have questions or need follow up information about today's clinic visit or your schedule please contact [facility] directly at [phone].  Normal or non-critical lab and imaging results will be communicated to you by MyChart, letter or phone within 4 business days after the clinic has received the results. If you do not hear from us within 7 days, please contact the clinic through [employer] or phone. If you have a critical or abnormal lab result, we will notify you by phone as soon as possible.  Submit refill requests through [employer] or call your pharmacy and they will forward the refill request to us. Please allow 3 business days for your refill to be completed.          Additional Information About Your Visit        MyChart Information     [employer] gives you secure access to your electronic health record. If you see a primary care provider, you can also send messages to your care team and make appointments. If you have questions, please call your primary care clinic.  If you do not have a primary care provider, please call [phone] and they will assist you.        Care EveryWhere ID     This is your Care EveryWhere ID. This could be used by other organizations to access your [facility] medical records  [id number]        Your Vitals Were     Pulse Temperature Respirations Height Last Period Pulse Oximetry    72 98.1  F (36.7  C) (Oral) 16 5' 8.8\" (1.748 m) 07/16/2018 (Exact Date) 99%    Breastfeeding? BMI (Body Mass Index)                No 25.79 kg/m2           Blood Pressure from Last 3 Encounters:   09/17/18 110/60   08/07/18 116/60   07/12/18 112/62    Weight from Last 3 Encounters:   09/17/18 173 lb 9.6 oz (78.7 kg)   08/07/18 171 lb 3.2 oz (77.7 kg)   07/12/18 172 lb 6.4 oz (78.2 kg)    "           We Performed the Following     Beta HCG Qual, Urine - [facility] and [place] ([id number])     MENTAL HEALTH REFERRAL  - Adult; Assessments and Testing; General Psychological Testing; Other: [facility] [phone]; We will contact you to schedule the appointment or please call with any questions     MENTAL HEALTH REFERRAL  - Adult; Psychiatry and Medication Management; Psychiatry; Other: [facility] [phone]; We will contact you to schedule the appointment or please call with any questions     OB/GYN REFERRAL          Today's Medication Changes          These changes are accurate as of 9/17/18  8:28 AM.  If you have any questions, ask your nurse or doctor.               Stop taking these medicines if you haven't already. Please contact your care team if you have questions.     busPIRone 5 MG tablet   Commonly known as:  BUSPAR   Stopped by:  Giulia Godoy MD           ranitidine 150 MG tablet   Commonly known as:  ZANTAC   Stopped by:  Giulia Godoy MD                Where to get your medicines      These medications were sent to [place] [address] [place] [facility] [address] [facility] [address]     Phone:  [phone]     escitalopram 20 MG tablet                Primary Care Provider Office Phone # Fax #    Giulia Godoy [phone] [address] [facility] [address]        Equal Access to Services     [facility] AH: Hadii jeison ku hadasho Soomaali, waaxda luqadaha, qaybta kaalmada adeegyada, nitza rae hayjorge luisn ariel bravo . So [facility] [phone].    ATENCIÓN: Si habla español, tiene a bowman disposición servicios gratuitos de asistencia lingüística. Llame al [phone].    We comply with applicable federal civil rights laws and [place] laws. We do not discriminate on the basis of race, color, national origin, age, disability, sex, sexual orientation, or gender identity.            Thank you!      Thank you for choosing [facility]  for your care. Our goal is always to provide you with excellent care. Hearing back from our patients is one way we can continue to improve our services. Please take a few minutes to complete the written survey that you may receive in the mail after your visit with us. Thank you!             Your Updated Medication List - Protect others around you: Learn how to safely use, store and throw away your medicines at www.disposemymeds.org.          This list is accurate as of 9/17/18  8:28 AM.  Always use your most recent med list.                   Brand Name Dispense Instructions for use Diagnosis    ammonium lactate 12 % lotion    LAC-HYDRIN    360 g    Apply topically 2 times daily as needed for dry skin    Keratosis pilaris       COLACE PO           escitalopram 20 MG tablet    LEXAPRO    30 tablet    20 mg daily    Mild major depression (H)       fluocinonide 0.05 % solution    LIDEX    60 mL    Apply sparingly to affected area twice daily as needed.  Do not apply to face.    Scalp psoriasis       loratadine 10 MG tablet    CLARITIN     Take 20 mg by mouth daily        prenatal multivitamin plus iron 27-0.8 MG Tabs per tablet      Take 1 tablet by mouth daily

## 2018-09-17 NOTE — PATIENT INSTRUCTIONS
[facility]    If you have any questions regarding to your visit please contact your care team:       Team Red:   Clinic Hours Telephone Number   Dr. Shelley Dennis NP 7am-7pm  Monday - Thursday   7am-5pm  Fridays  [phone]  (Appointment scheduling available 24/7)   Urgent Care - [place] and [facility] - 11am-9pm Monday-Friday Saturday-Sunday- 9am-5pm   [place] - 5pm-9pm Monday-Friday Saturday-Sunday- 9am-5pm  [phone] - [place]  [phone] - [place]       What options do I have for a visit other than an office visit? We offer electronic visits (e-visits) and telephone visits, when medically appropriate.  Please check with your medical insurance to see if these types of visits are covered, as you will be responsible for any charges that are not paid by your insurance.      You can use [employer] (secure electronic communication) to access to your chart, send your primary care provider a message, or make an appointment. Ask a team member how to get started.     For a price quote for your services, please call our Consumer Price Line at [phone] or our Imaging Cost estimation line at [phone] (for imaging tests).    RIGOBERTO Toribio CMA ([facility])

## 2018-09-18 ASSESSMENT — PATIENT HEALTH QUESTIONNAIRE - PHQ9: SUM OF ALL RESPONSES TO PHQ QUESTIONS 1-9: 3

## 2018-10-11 ENCOUNTER — ALLIED HEALTH/NURSE VISIT (OUTPATIENT)
Dept: NURSING | Facility: CLINIC | Age: 28
End: 2018-10-11
Payer: COMMERCIAL

## 2018-10-11 VITALS
SYSTOLIC BLOOD PRESSURE: 110 MMHG | WEIGHT: 172 LBS | HEART RATE: 76 BPM | HEIGHT: 69 IN | BODY MASS INDEX: 25.48 KG/M2 | DIASTOLIC BLOOD PRESSURE: 68 MMHG

## 2018-10-11 DIAGNOSIS — O99.340 DEPRESSION COMPLICATING PREGNANCY, ANTEPARTUM: Primary | ICD-10-CM

## 2018-10-11 DIAGNOSIS — F32.A DEPRESSION COMPLICATING PREGNANCY, ANTEPARTUM: Primary | ICD-10-CM

## 2018-10-11 LAB
ABO + RH BLD: NORMAL
ABO + RH BLD: NORMAL
ALBUMIN UR-MCNC: NEGATIVE MG/DL
APPEARANCE UR: CLEAR
BILIRUB UR QL STRIP: NEGATIVE
BLD GP AB SCN SERPL QL: NORMAL
BLOOD BANK CMNT PATIENT-IMP: NORMAL
BLOOD BANK CMNT PATIENT-IMP: NORMAL
COLOR UR AUTO: YELLOW
ERYTHROCYTE [DISTWIDTH] IN BLOOD BY AUTOMATED COUNT: 14.4 % (ref 10–15)
GLUCOSE UR STRIP-MCNC: NEGATIVE MG/DL
HCT VFR BLD AUTO: 38.7 % (ref 35–47)
HGB BLD-MCNC: 12.8 G/DL (ref 11.7–15.7)
HGB UR QL STRIP: NEGATIVE
KETONES UR STRIP-MCNC: NEGATIVE MG/DL
LEUKOCYTE ESTERASE UR QL STRIP: NEGATIVE
MCH RBC QN AUTO: 29.6 PG (ref 26.5–33)
MCHC RBC AUTO-ENTMCNC: 33.1 G/DL (ref 31.5–36.5)
MCV RBC AUTO: 89 FL (ref 78–100)
NITRATE UR QL: NEGATIVE
PH UR STRIP: 7 PH (ref 5–7)
PLATELET # BLD AUTO: 374 10E9/L (ref 150–450)
RBC # BLD AUTO: 4.33 10E12/L (ref 3.8–5.2)
SOURCE: NORMAL
SP GR UR STRIP: 1.01 (ref 1–1.03)
SPECIMEN EXP DATE BLD: NORMAL
UROBILINOGEN UR STRIP-ACNC: 0.2 EU/DL (ref 0.2–1)
WBC # BLD AUTO: 9.9 10E9/L (ref 4–11)

## 2018-10-11 PROCEDURE — 86901 BLOOD TYPING SEROLOGIC RH(D): CPT | Performed by: OBSTETRICS & GYNECOLOGY

## 2018-10-11 PROCEDURE — 85027 COMPLETE CBC AUTOMATED: CPT | Performed by: OBSTETRICS & GYNECOLOGY

## 2018-10-11 PROCEDURE — 86850 RBC ANTIBODY SCREEN: CPT | Performed by: OBSTETRICS & GYNECOLOGY

## 2018-10-11 PROCEDURE — 86762 RUBELLA ANTIBODY: CPT | Performed by: OBSTETRICS & GYNECOLOGY

## 2018-10-11 PROCEDURE — 86900 BLOOD TYPING SEROLOGIC ABO: CPT | Performed by: OBSTETRICS & GYNECOLOGY

## 2018-10-11 PROCEDURE — 87340 HEPATITIS B SURFACE AG IA: CPT | Performed by: OBSTETRICS & GYNECOLOGY

## 2018-10-11 PROCEDURE — 99207 ZZC NO CHARGE NURSE ONLY: CPT

## 2018-10-11 PROCEDURE — 87389 HIV-1 AG W/HIV-1&-2 AB AG IA: CPT | Performed by: OBSTETRICS & GYNECOLOGY

## 2018-10-11 PROCEDURE — 87086 URINE CULTURE/COLONY COUNT: CPT | Performed by: OBSTETRICS & GYNECOLOGY

## 2018-10-11 PROCEDURE — 86780 TREPONEMA PALLIDUM: CPT | Performed by: OBSTETRICS & GYNECOLOGY

## 2018-10-11 PROCEDURE — 36415 COLL VENOUS BLD VENIPUNCTURE: CPT | Performed by: OBSTETRICS & GYNECOLOGY

## 2018-10-11 PROCEDURE — 81003 URINALYSIS AUTO W/O SCOPE: CPT | Performed by: OBSTETRICS & GYNECOLOGY

## 2018-10-11 NOTE — MR AVS SNAPSHOT
After Visit Summary   10/11/2018    Sophie Felton    MRN: [id number]           Patient Information     Date Of Birth          1990        Visit Information        Provider Department      10/11/2018 1:30 PM NE RN [facility]        Today's Diagnoses     Depression complicating pregnancy, antepartum    -  1       Follow-ups after your visit        Your next 10 appointments already scheduled     Oct 16, 2018  8:15 AM CDT   New Prenatal with Christa Winchester MD   [facility] ([facility])    [address] [place] [phone]              Who to contact     If you have questions or need follow up information about today's clinic visit or your schedule please contact [facility] directly at [phone].  Normal or non-critical lab and imaging results will be communicated to you by MyChart, letter or phone within 4 business days after the clinic has received the results. If you do not hear from us within 7 days, please contact the clinic through MyChart or phone. If you have a critical or abnormal lab result, we will notify you by phone as soon as possible.  Submit refill requests through [employer] or call your pharmacy and they will forward the refill request to us. Please allow 3 business days for your refill to be completed.          Additional Information About Your Visit        MyChart Information     [employer] gives you secure access to your electronic health record. If you see a primary care provider, you can also send messages to your care team and make appointments. If you have questions, please call your primary care clinic.  If you do not have a primary care provider, please call [phone] and they will assist you.        Care EveryWhere ID     This is your Care EveryWhere ID. This could be used by other organizations to access your [facility] medical records  [id number]       "  Your Vitals Were     Pulse Height Last Period Breastfeeding? BMI (Body Mass Index)       76 5' 8.9\" (1.75 m) 07/16/2018 No 25.48 kg/m2        Blood Pressure from Last 3 Encounters:   10/11/18 110/68   09/17/18 110/60   08/07/18 116/60    Weight from Last 3 Encounters:   10/11/18 172 lb (78 kg)   09/17/18 173 lb 9.6 oz (78.7 kg)   08/07/18 171 lb 3.2 oz (77.7 kg)              We Performed the Following     *UA reflex to Microscopic     ABO/Rh type and screen     CBC with platelets     Hepatitis B surface antigen     HIV Antigen Antibody Combo     Rubella Antibody IgG Quantitative     Treponema Abs w Reflex to RPR and Titer     Urine Culture Aerobic Bacterial        Primary Care Provider Office Phone # Fax #    Giulia Godoy [phone] [address] [facility] [address]        Equal Access to Services     [facility]ium[facility]US[facility] AH: Hadii aad ku hadasho Soomaali, waaxda luqadaha, qaybta kaalmada adeegyada, nitza bravo . So [facility] [phone].    ATENCIÓN: Si fredola kush, tiene a bowman disposición servicios gratuitos de asistencia lingüística. Llame al [phone].    We comply with applicable federal civil rights laws and [place] laws. We do not discriminate on the basis of race, color, national origin, age, disability, sex, sexual orientation, or gender identity.            Thank you!     Thank you for choosing [facility] [place] [facility]  for your care. Our goal is always to provide you with excellent care. Hearing back from our patients is one way we can continue to improve our services. Please take a few minutes to complete the written survey that you may receive in the mail after your visit with us. Thank you!             Your Updated Medication List - Protect others around you: Learn how to safely use, store and throw away your medicines at www.disposemymeds.org.          This list is accurate as of 10/11/18  2:14 PM.  Always use your most recent med list.       "             Brand Name Dispense Instructions for use Diagnosis    ammonium lactate 12 % lotion    LAC-HYDRIN    360 g    Apply topically 2 times daily as needed for dry skin    Keratosis pilaris       COLACE PO           escitalopram 20 MG tablet    LEXAPRO    30 tablet    20 mg daily    Mild major depression (H)       fluocinonide 0.05 % solution    LIDEX    60 mL    Apply sparingly to affected area twice daily as needed.  Do not apply to face.    Scalp psoriasis       loratadine 10 MG tablet    CLARITIN     Take 20 mg by mouth daily        prenatal multivitamin plus iron 27-0.8 MG Tabs per tablet      Take 1 tablet by mouth daily

## 2018-10-11 NOTE — PROGRESS NOTES
Prenatal OB Questionnaire  Past Medical History  Diabetes   No  Hypertension   No  Heart Disease, mitral valve prolapse, or rheumatic fever?   No  An autoimmune disorder such as Lupus or Rheumatoid Arthritis?   No  Kidney Disease or Urinary Tract Infection?   No  Epilepsy, seizures or spells?   No  Migraine headaches?   No  A stroke or loss of function or sensation?   No  Any other neurological problems?   No  Have you ever been treated for depression?  Yes on laexapro  Are you having problems with crying spells or loss of self-esteem?   No  Have you ever required psychiatric care?   No  Have you ever hepatitis, liver disease or jaundice?   No  Have you ever been treated for blood clots in your veins, deep venous thrombosis, inflammation in the veins, thrombosis, phlebitis, pulmonary embolism or varicosities?   No  Have you had excessive bleeding after surgery or dental work?   No  Do you bleed more than other women after a cut or scratch?   No  Do you have a history of anemia?   No  Have you ever been treated for thyroid problems or taken thyroid medication?  No  Do you have any other endocrine problems?  No  Have you ever been in a major accident or suffered serious trauma?   No  Within the last year, has anyone hit slapped, kicked or otherwise hurt you?  No  In the last year, has anyone forced you to have sex when you didn't want to?  No  Have you ever had a blood transfusion?   No  Would you refuse a blood transfusion if a doctor judged it to be medically necessary?   No  If you answered yes, would you rather die than have a blood transfusion?   No  If you answered yes, is this for Presybeterian reasons?   No  Does anyone in your home smoke?   No  Do you use tobacco products?  No  Do you drink beer, wine, hard liquor?  No  Do you use any of the following: marijuana, speed, cocaine, heroine, hallucinogens, or other drugs?  No  Is your blood type Rh negative?   No  Have you ever had abnormal antibodies in your blood?    No  Have you ever had asthma?   No  Have you ever had tuberculosis?   No  Do you have any allergies to drugs or over-the-counter medications?   Yes    Allergies as of 10/11/2018:    Allergies as of 10/11/2018 - Andrea as Reviewed 09/17/2018   Allergen Reaction Noted     Septra [sulfamethoxazole w/trimethoprim]  03/06/2017     Sulfamethoxazole-trimethoprim Hives 04/22/2009       Do you have any breast problems?   No  Have you ever ?   Yes  Have you had any gynecological surgical procedures such as cervical conization, a LEEP procedure, laser treatment, cryosurgery of the cervix, or a dilation and curettage, etc?  No  Have you had any other surgical procedures?  No  Have you been hospitalized for a nonsurgical reason excluding normal delivery?   No  Have you ever had any anesthetic complications?   No  Have you ever had an abnormal pap smear?   No  Do you have a history of abnormalities of the uterus?   No  Did it take you more than one year to become pregnant?   No  Have you ever been evaluated or treated for infertility?   No  Is there a history of medical problems in your family, which you feel might adversely affect your health or pregnancy?   No  Do you have any other problems we have not asked you about which you feel may be important to this pregnancy?  No    Symptoms since Last Menstrual Period  Do you have any of the following:    *abdominal pain  No  *blood in stool or urine  No  *chest pain  No  *shortness of breath  No  *coughing or vomiting up blood No  *heart racing or skipping beats  No  *nausea and vomiting  Yes  *pain with urination  No  *vaginal discharge or bleeding  No  Current medications are:  Current Outpatient Prescriptions   Medication Sig Dispense Refill     escitalopram (LEXAPRO) 20 MG tablet 20 mg daily 30 tablet 0     Prenatal Vit-Fe Fumarate-FA (PRENATAL MULTIVITAMIN  PLUS IRON) 27-0.8 MG TABS per tablet Take 1 tablet by mouth daily       ammonium lactate (LAC-HYDRIN) 12 % lotion  Apply topically 2 times daily as needed for dry skin 360 g 3     Docusate Sodium (COLACE PO)        fluocinonide (LIDEX) 0.05 % solution Apply sparingly to affected area twice daily as needed.  Do not apply to face. 60 mL 0     loratadine (CLARITIN) 10 MG tablet Take 20 mg by mouth daily         Genetic Screening  At the time of birth, will you be 35 years old or older?  No  Has the patient, baby s father, or anyone in either family had:  Thalassemia (Italian, Greek, Mediterranean, or  background only) and an MCV result less than 80?  No  Neural tube defect such as meningomyelocele, spina bifida or anencephaly?  No  Congenital heart defect?  No  Down s syndrome?  Yes FOB uncle ( moms brother) has downs.  Iron-Sach s disease ([place], Cajun, French-Newton)?  No  Sickle cell disease or trait ([place])?  No  Hemophilia or other inherited problems of blood coagulation? N/A  Muscular dystrophy?  N/A  Cystic Fibrosis?  No  Pemiscot s chorea?  No  Mental retardation/autism? Yes MOB ( fathers brother has MR)   If yes, was the person tested for fragile X?  No  Any other inherited genetic or chromosomal disorder?  No  Maternal metabolic disorder (e.g. insulin-dependent diabetes, PKU)? No  A child with birth defects not listed above?  No  Recurrent pregnancy loss or a stillbirth?  No  Has the patient had any medications/street drugs/alcohol since her last menstrual period? No  Does the patient or baby s father have any other genetic risks?  No   Fob Maternal grandmother was born deaf.  Infection History  Do you object to being tested for Hepatitis B? No  Do you object to being tested for HIV? No  Do you feel that you are at high risk for coming in contact with the AIDS virus?  No  Have you ever been treated for tuberculosis?  No  Have you ever received the BCG vaccine for tuberculosis?  No  Have you ever had a positive skin test for tuberculosis? No  Do you live with someone who has tuberculosis?  No  Have you ever been  exposed to tuberculosis?  No  Do you have genital herpes?  No  Does your partner have genital herpes?  No  Have you had a rash or viral illness since your last period?  No  Have you ever had Gonorrhea, Chlamydia, Syphilis, venereal warts, trichomoniasis, pelvic inflammatory disease or any other sexually transmitted disease?  No  Do you know if you are a genital group B streptococcus carrier? No  You have not had chicken pox/varicella  Yes has had  Have you been vaccinated against chicken pox?  No  Have you had any other infectious disease? No        Early ultrasound screening tool:    Does patient have irregular periods?  No  Did patient use hormonal birth control in the three months prior to positive urine pregnancy test? No  Is the patient breastfeeding?  No  Is the patient 10 weeks or greater at time of education visit?   about 12    No early us indicated.      Patient presents to clinic today prenatal education. This is the patient's 4 pregnancy. She has had 1 miscarriage(s), 0 (s), 2 term birth(s) and 0  birth(s). She has 2 living child(karissa).   This {was a planned pregnancy.  Reviewed answers to patient's Prenatal OB Questionnaire. Screening is positive for as noted above.     Medical, surgical, family and ObGyn history updated as appropriate. Reviewed current medications and allergies. Patient {is taking prenatal vitamins.     Discussed all of the options within [place] for her prenatal care including Dr. Holland and Dr. Saunders at [facility], midwives at [facility] and OB/GYN-Dr. Holm and Dr. Winchester. Next visit scheduled with Dr Winchester.     Reviewed and discussed OB 1st Trimester Plans/Education  and also summarized in detail handouts and brochures included in OB Prenatal Folder that patient is able to keep and bring home with her.    Discussed all of the blood tests with pt who agrees to have all including HIV. Pt aware that results will be discussed at next office visit  with MD unless abnl results are indicated and phone call will be made.    Will forward chart on to Prenatal Care Provider to review.    Tia Sullivan,Clinic Rn  [place]

## 2018-10-12 LAB
BACTERIA SPEC CULT: NORMAL
HBV SURFACE AG SERPL QL IA: NONREACTIVE
HIV 1+2 AB+HIV1 P24 AG SERPL QL IA: NONREACTIVE
SPECIMEN SOURCE: NORMAL

## 2018-10-13 LAB
RUBV IGG SERPL IA-ACNC: 92 IU/ML
T PALLIDUM AB SER QL: NONREACTIVE

## 2018-10-13 NOTE — PROGRESS NOTES
Adryan Barrios,  Congratulations!  Your prenatal labs are normal.  I look forward to seeing you soon.  Dr. Winchester

## 2018-10-16 ENCOUNTER — PRENATAL OFFICE VISIT (OUTPATIENT)
Dept: OBGYN | Facility: CLINIC | Age: 28
End: 2018-10-16
Payer: COMMERCIAL

## 2018-10-16 VITALS
BODY MASS INDEX: 25.74 KG/M2 | DIASTOLIC BLOOD PRESSURE: 60 MMHG | SYSTOLIC BLOOD PRESSURE: 114 MMHG | WEIGHT: 173.8 LBS | TEMPERATURE: 98.5 F

## 2018-10-16 DIAGNOSIS — Z34.01 ENCOUNTER FOR SUPERVISION OF NORMAL FIRST PREGNANCY IN FIRST TRIMESTER: Primary | ICD-10-CM

## 2018-10-16 DIAGNOSIS — O21.9 NAUSEA/VOMITING IN PREGNANCY: ICD-10-CM

## 2018-10-16 PROBLEM — Z34.90 SUPERVISION OF NORMAL PREGNANCY: Status: ACTIVE | Noted: 2017-04-05

## 2018-10-16 PROCEDURE — 99207 ZZC FIRST OB VISIT: CPT | Performed by: OBSTETRICS & GYNECOLOGY

## 2018-10-16 RX ORDER — ONDANSETRON 4 MG/1
4 TABLET, FILM COATED ORAL EVERY 8 HOURS PRN
Qty: 20 TABLET | Refills: 1 | Status: ON HOLD | OUTPATIENT
Start: 2018-10-16 | End: 2019-04-19

## 2018-10-16 NOTE — NURSING NOTE
"Chief Complaint   Patient presents with     Prenatal Care     13+1       Initial /60  Temp 98.5  F (36.9  C) (Oral)  Wt 173 lb 12.8 oz (78.8 kg)  LMP 2018  BMI 25.74 kg/m2 Estimated body mass index is 25.74 kg/(m^2) as calculated from the following:    Height as of 10/11/18: 5' 8.9\" (1.75 m).    Weight as of this encounter: 173 lb 12.8 oz (78.8 kg).  BP completed using cuff size: regular        The following HM Due: NONE      The following patient reported/Care Every where data was sent to:  P ABSTRACT QUALITY INITIATIVES [50140]        patient has appointment for today  Nanette Meredith                "

## 2018-10-16 NOTE — PROGRESS NOTES
SUBJECTIVE: Sophie Felton is a 28 year old   here for initial OB visit.  Pt is a nurse, works at a [employer] in [place].  Porfirio (not here today) stays home, works as a DJ on the weekends.  Nauseated, occ vomiting.   Prior uncomplicated  x 2, no epidural, does not plan again. Maybe nitrous.     Past Medical History:   Diagnosis Date     Depressive disorder Age 15    Anxiety and depression has worsened since swcond birth     Family history of colon cancer 2018       Past Surgical History:   Procedure Laterality Date     EYE SURGERY  2010    LASIK       Family History   Problem Relation Age of Onset     Diabetes Brother      Substance Abuse Brother      Thyroid Disease Brother      Colon Cancer Father      age 62     Thyroid Disease Maternal Grandmother      Prostate Cancer Maternal Grandfather        Social History     Social History     Marital status:      Spouse name: N/A     Number of children: 2     Years of education: N/A     Occupational History     Nurse      Social History Main Topics     Smoking status: Never Smoker     Smokeless tobacco: Never Used     Alcohol use No     Drug use: No     Sexual activity: Yes     Partners: Male     Birth control/ protection: Pull-out method, Condom     Other Topics Concern     Parent/Sibling W/ Cabg, Mi Or Angioplasty Before 65f 55m? No     Social History Narrative         Current Outpatient Prescriptions:      ammonium lactate (LAC-HYDRIN) 12 % lotion, Apply topically 2 times daily as needed for dry skin, Disp: 360 g, Rfl: 3     Docusate Sodium (COLACE PO), , Disp: , Rfl:      escitalopram (LEXAPRO) 20 MG tablet, TAKE 1 TABLET BY MOUTH EVERY DAY, Disp: 30 tablet, Rfl: 0     fluocinonide (LIDEX) 0.05 % solution, Apply sparingly to affected area twice daily as needed.  Do not apply to face., Disp: 60 mL, Rfl: 0     loratadine (CLARITIN) 10 MG tablet, Take 20 mg by mouth daily, Disp: , Rfl:      Prenatal Vit-Fe Fumarate-FA (PRENATAL  MULTIVITAMIN  PLUS IRON) 27-0.8 MG TABS per tablet, Take 1 tablet by mouth daily, Disp: , Rfl:     Allergies   Allergen Reactions     Septra [Sulfamethoxazole W/Trimethoprim]      Sulfamethoxazole-Trimethoprim Hives     As a child         Past Medical History of Father of Baby: No significant medical history    Review of Systems:   Constitutional, HEENT, cardiovascular, pulmonary, gi and gu systems are negative, except as otherwise noted.     History Since Last Menstrual Period: Nausea    EXAM:   Vitals:    10/16/18 0823   BP: 114/60   Temp: 98.5  F (36.9  C)   TempSrc: Oral   Weight: 173 lb 12.8 oz (78.8 kg)   Body mass index is 25.74 kg/(m^2).     GENERAL APPEARANCE: healthy, alert and no distress  EYES: EOMI,  PERRL  HENT: Nose and mouth without ulcers or lesions  NECK: no adenopathy, no asymmetry, masses, or scars and thyroid normal to palpation  RESP: lungs clear to auscultation - no rales, rhonchi or wheezes  BREAST: normal without masses, tenderness or nipple discharge and no palpable axillary masses or adenopathy  CV: regular rates and rhythm, normal S1 S2, no S3 or S4 and no murmur, click or rub -  ABDOMEN:  soft, nontender, no HSM or masses and bowel sounds normal  : normal cervix, adnexae, and uterus without masses or discharge  MS: extremities normal- no gross deformities noted, no evidence of inflammation in joints, FROM in all extremities.  SKIN: no suspicious lesions or rashes  NEURO: Normal strength and tone, sensory exam grossly normal, mentation intact and speech normal  PSYCH: mentation appears normal and affect normal/bright  LYMPHATICS: No axillary, cervical, inguinal, or supraclavicular nodes    Pelvix exam:  Perineum: Intact;   Vulva: Normal;  Vagina: Normal mucosa, no discharge,   Cervix: Parous, closed, mobile, no discharge;  Uterus: 13 weeks, Normal shape, position and consistency and Nontender;   Adnexa: Normal;  Anus: Normal without lesion or mass;   Bony Pelvis: Adequate.      Ultrasound: Informal for heart tones. Viable thurston IUP c/w prior dates.     ASSESSMENT/ PLAN:  Sophie Felton is a 28 year old   at 13 weeks 1 days by LMP c/w ultrasound today.   Follow up in 4 weeks.  Normal exercise.  Normal sexual activity.  Prenatal vitamins.  Anticipated weight gain:  BMI 25-29.9: 15-25 pounds  Flu shot next visit.   TDaP 27-36 weeks  Oriented to practice, PNC  Discussed aneuploidy screening, declines.   Zofran for occ vomiting.

## 2018-10-16 NOTE — MR AVS SNAPSHOT
After Visit Summary   10/16/2018    Sophie Felton    MRN: [id number]           Patient Information     Date Of Birth          1990        Visit Information        Provider Department      10/16/2018 8:15 Christa Salvador MD [facility]        Today's Diagnoses     Encounter for supervision of normal first pregnancy in first trimester    -  1    Nausea/vomiting in pregnancy           Follow-ups after your visit        Who to contact     If you have questions or need follow up information about today's clinic visit or your schedule please contact [facility] directly at [phone].  Normal or non-critical lab and imaging results will be communicated to you by MyChart, letter or phone within 4 business days after the clinic has received the results. If you do not hear from us within 7 days, please contact the clinic through MuckRockhart or phone. If you have a critical or abnormal lab result, we will notify you by phone as soon as possible.  Submit refill requests through Symvato or call your pharmacy and they will forward the refill request to us. Please allow 3 business days for your refill to be completed.          Additional Information About Your Visit        MyChart Information     Symvato gives you secure access to your electronic health record. If you see a primary care provider, you can also send messages to your care team and make appointments. If you have questions, please call your primary care clinic.  If you do not have a primary care provider, please call [phone] and they will assist you.        Care EveryWhere ID     This is your Care EveryWhere ID. This could be used by other organizations to access your [facility] medical records  [id number]        Your Vitals Were     Temperature Last Period BMI (Body Mass Index)             98.5  F (36.9  C) (Oral) 07/16/2018 25.74 kg/m2          Blood Pressure from Last 3 Encounters:   10/16/18  114/60   10/11/18 110/68   09/17/18 110/60    Weight from Last 3 Encounters:   10/16/18 173 lb 12.8 oz (78.8 kg)   10/11/18 172 lb (78 kg)   09/17/18 173 lb 9.6 oz (78.7 kg)              Today, you had the following     No orders found for display         Today's Medication Changes          These changes are accurate as of 10/16/18  1:55 PM.  If you have any questions, ask your nurse or doctor.               Start taking these medicines.        Dose/Directions    ondansetron 4 MG tablet   Commonly known as:  ZOFRAN   Used for:  Nausea/vomiting in pregnancy        Dose:  4 mg   Take 1 tablet (4 mg) by mouth every 8 hours as needed for nausea   Quantity:  20 tablet   Refills:  1            Where to get your medicines      These medications were sent to [place] [address] [place] [facility] [address] [facility]  [address] [facility] [address]     Phone:  [phone]     ondansetron 4 MG tablet                Primary Care Provider Office Phone # Fax #    Giulia Godoy [phone] [address] [facility] [address]        Equal Access to Services     BERT RALPH [facility]: Hadii jeison villafana hadlatonyao Sojennifer, waaxda luqadaha, qaybta kaalmada adeegyada, nitza sorto. So [facility] [phone].    ATENCIÓN: Si habla español, tiene a bowman disposición servicios gratuitos de asistencia lingüística. [facility] [phone].    We comply with applicable federal civil rights laws and [place] laws. We do not discriminate on the basis of race, color, national origin, age, disability, sex, sexual orientation, or gender identity.            Thank you!     Thank you for choosing [facility]  for your care. Our goal is always to provide you with excellent care. Hearing back from our patients is one way we can continue to improve our services. Please take a few minutes to complete the written survey that you may receive in the mail after your visit with us. Thank you!              Your Updated Medication List - Protect others around you: Learn how to safely use, store and throw away your medicines at www.disposemymeds.org.          This list is accurate as of 10/16/18  1:55 PM.  Always use your most recent med list.                   Brand Name Dispense Instructions for use Diagnosis    ammonium lactate 12 % lotion    LAC-HYDRIN    360 g    Apply topically 2 times daily as needed for dry skin    Keratosis pilaris       COLACE PO           escitalopram 20 MG tablet    LEXAPRO    30 tablet    TAKE 1 TABLET BY MOUTH EVERY DAY    Mild major depression (H)       fluocinonide 0.05 % solution    LIDEX    60 mL    Apply sparingly to affected area twice daily as needed.  Do not apply to face.    Scalp psoriasis       loratadine 10 MG tablet    CLARITIN     Take 20 mg by mouth daily        ondansetron 4 MG tablet    ZOFRAN    20 tablet    Take 1 tablet (4 mg) by mouth every 8 hours as needed for nausea    Nausea/vomiting in pregnancy       prenatal multivitamin plus iron 27-0.8 MG Tabs per tablet      Take 1 tablet by mouth daily

## 2018-11-12 ENCOUNTER — PRENATAL OFFICE VISIT (OUTPATIENT)
Dept: OBGYN | Facility: CLINIC | Age: 28
End: 2018-11-12
Payer: COMMERCIAL

## 2018-11-12 VITALS
SYSTOLIC BLOOD PRESSURE: 105 MMHG | HEART RATE: 80 BPM | WEIGHT: 170.4 LBS | DIASTOLIC BLOOD PRESSURE: 57 MMHG | HEIGHT: 69 IN | BODY MASS INDEX: 25.24 KG/M2 | OXYGEN SATURATION: 98 %

## 2018-11-12 DIAGNOSIS — Z34.82 ENCOUNTER FOR SUPERVISION OF OTHER NORMAL PREGNANCY, SECOND TRIMESTER: Primary | ICD-10-CM

## 2018-11-12 DIAGNOSIS — Z23 NEED FOR PROPHYLACTIC VACCINATION AND INOCULATION AGAINST INFLUENZA: ICD-10-CM

## 2018-11-12 PROCEDURE — 99207 ZZC PRENATAL VISIT: CPT | Performed by: OBSTETRICS & GYNECOLOGY

## 2018-11-12 PROCEDURE — 90471 IMMUNIZATION ADMIN: CPT | Performed by: OBSTETRICS & GYNECOLOGY

## 2018-11-12 PROCEDURE — 90686 IIV4 VACC NO PRSV 0.5 ML IM: CPT | Performed by: OBSTETRICS & GYNECOLOGY

## 2018-11-12 NOTE — PROGRESS NOTES
17w0d  No complaints.  Still has some food aversions, otherwise feeling well.   Reviewed weight.  Declines quad screen.  US order placed.    is planning on a vasectomy.   RR

## 2018-11-12 NOTE — MR AVS SNAPSHOT
After Visit Summary   11/12/2018    Sophie Felton    MRN: [id number]           Patient Information     Date Of Birth          1990        Visit Information        Provider Department      11/12/2018 11:45 AM Lulú Matias MD [facility]        Today's Diagnoses     Encounter for supervision of other normal pregnancy, second trimester    -  1    Need for prophylactic vaccination and inoculation against influenza           Follow-ups after your visit        Future tests that were ordered for you today     Open Future Orders        Priority Expected Expires Ordered    US OB > 14 Weeks Routine  11/12/2019 11/12/2018            Who to contact     If you have questions or need follow up information about today's clinic visit or your schedule please contact [facility] directly at [phone].  Normal or non-critical lab and imaging results will be communicated to you by MediProPharmahart, letter or phone within 4 business days after the clinic has received the results. If you do not hear from us within 7 days, please contact the clinic through MediProPharmahart or phone. If you have a critical or abnormal lab result, we will notify you by phone as soon as possible.  Submit refill requests through Veracode or call your pharmacy and they will forward the refill request to us. Please allow 3 business days for your refill to be completed.          Additional Information About Your Visit        [email or web address] Information     Veracode gives you secure access to your electronic health record. If you see a primary care provider, you can also send messages to your care team and make appointments. If you have questions, please call your primary care clinic.  If you do not have a primary care provider, please call [phone] and they will assist you.        Care EveryWhere ID     This is your Care EveryWhere ID. This could be used by other organizations to access your [facility]  "records  [id number]        Your Vitals Were     Pulse Height Last Period Pulse Oximetry BMI (Body Mass Index)       80 5' 8.9\" (1.75 m) 07/16/2018 98% 25.24 kg/m2        Blood Pressure from Last 3 Encounters:   11/12/18 105/57   10/16/18 114/60   10/11/18 110/68    Weight from Last 3 Encounters:   11/12/18 170 lb 6.4 oz (77.3 kg)   10/16/18 173 lb 12.8 oz (78.8 kg)   10/11/18 172 lb (78 kg)              We Performed the Following     FLU VACCINE, SPLIT VIRUS, IM (QUADRIVALENT) [63666]- >3 YRS     Vaccine Administration, Initial [56914]        Primary Care Provider Office Phone # Fax #    Giulia Godoy [phone] [address] [facility] [address]        Equal Access to Services     [facility]: Hadii jeison villafana hadasho Sojennifer, waaxda luqadaha, qaybta kaalmada adeegyada, nitza bravo . So [facility] [phone].    ATENCIÓN: Si habla español, tiene a bowman disposición servicios gratuitos de asistencia lingüística. [facility]adi al [phone].    We comply with applicable federal civil rights laws and [place] laws. We do not discriminate on the basis of race, color, national origin, age, disability, sex, sexual orientation, or gender identity.            Thank you!     Thank you for choosing [facility]  for your care. Our goal is always to provide you with excellent care. Hearing back from our patients is one way we can continue to improve our services. Please take a few minutes to complete the written survey that you may receive in the mail after your visit with us. Thank you!             Your Updated Medication List - Protect others around you: Learn how to safely use, store and throw away your medicines at www.disposemymeds.org.          This list is accurate as of 11/12/18  1:10 PM.  Always use your most recent med list.                   Brand Name Dispense Instructions for use Diagnosis    ammonium lactate 12 % lotion    LAC-HYDRIN    360 g    Apply " topically 2 times daily as needed for dry skin    Keratosis pilaris       COLACE PO           escitalopram 20 MG tablet    LEXAPRO    30 tablet    TAKE 1 TABLET BY MOUTH EVERY DAY    Mild major depression (H)       fluocinonide 0.05 % solution    LIDEX    60 mL    Apply sparingly to affected area twice daily as needed.  Do not apply to face.    Scalp psoriasis       loratadine 10 MG tablet    CLARITIN     Take 20 mg by mouth daily        ondansetron 4 MG tablet    ZOFRAN    20 tablet    Take 1 tablet (4 mg) by mouth every 8 hours as needed for nausea    Nausea/vomiting in pregnancy       prenatal multivitamin plus iron 27-0.8 MG Tabs per tablet      Take 1 tablet by mouth daily

## 2018-11-12 NOTE — PROGRESS NOTES

## 2018-11-28 ENCOUNTER — RADIANT APPOINTMENT (OUTPATIENT)
Dept: ULTRASOUND IMAGING | Facility: CLINIC | Age: 28
End: 2018-11-28
Attending: OBSTETRICS & GYNECOLOGY
Payer: COMMERCIAL

## 2018-11-28 DIAGNOSIS — Z34.82 ENCOUNTER FOR SUPERVISION OF OTHER NORMAL PREGNANCY, SECOND TRIMESTER: ICD-10-CM

## 2018-11-28 PROCEDURE — 76805 OB US >/= 14 WKS SNGL FETUS: CPT | Performed by: OBSTETRICS & GYNECOLOGY

## 2018-12-13 ENCOUNTER — PRENATAL OFFICE VISIT (OUTPATIENT)
Dept: OBGYN | Facility: CLINIC | Age: 28
End: 2018-12-13
Payer: COMMERCIAL

## 2018-12-13 VITALS
HEART RATE: 99 BPM | DIASTOLIC BLOOD PRESSURE: 67 MMHG | WEIGHT: 174 LBS | SYSTOLIC BLOOD PRESSURE: 112 MMHG | OXYGEN SATURATION: 97 % | BODY MASS INDEX: 25.77 KG/M2

## 2018-12-13 DIAGNOSIS — Z34.82 ENCOUNTER FOR SUPERVISION OF OTHER NORMAL PREGNANCY, SECOND TRIMESTER: Primary | ICD-10-CM

## 2018-12-13 PROCEDURE — 99207 ZZC PRENATAL VISIT: CPT | Performed by: OBSTETRICS & GYNECOLOGY

## 2018-12-13 NOTE — PROGRESS NOTES
Feels well.  Plans to travel to [place] at 34 weeks ( has a conference there).  Has started Buspar 5 gm BID (sees a psychiatric provider) in addition to her Lexapro.  Survey ultrasound shows inadequate view of bladder, repeat scan is scheduled in a few weeks.  Will do one hour glucose next.  RTC 4 weeks.  AM

## 2018-12-19 ENCOUNTER — ANCILLARY PROCEDURE (OUTPATIENT)
Dept: ULTRASOUND IMAGING | Facility: CLINIC | Age: 28
End: 2018-12-19
Attending: OBSTETRICS & GYNECOLOGY
Payer: COMMERCIAL

## 2018-12-19 PROCEDURE — 76816 OB US FOLLOW-UP PER FETUS: CPT | Performed by: OBSTETRICS & GYNECOLOGY

## 2019-01-09 ENCOUNTER — PRENATAL OFFICE VISIT (OUTPATIENT)
Dept: OBGYN | Facility: CLINIC | Age: 29
End: 2019-01-09
Payer: COMMERCIAL

## 2019-01-09 VITALS
BODY MASS INDEX: 26.95 KG/M2 | DIASTOLIC BLOOD PRESSURE: 63 MMHG | WEIGHT: 182 LBS | HEART RATE: 85 BPM | SYSTOLIC BLOOD PRESSURE: 101 MMHG

## 2019-01-09 DIAGNOSIS — Z34.01 ENCOUNTER FOR SUPERVISION OF NORMAL FIRST PREGNANCY IN FIRST TRIMESTER: Primary | ICD-10-CM

## 2019-01-09 LAB
GLUCOSE 1H P 50 G GLC PO SERPL-MCNC: 100 MG/DL (ref 60–129)
HGB BLD-MCNC: 11.8 G/DL (ref 11.7–15.7)

## 2019-01-09 PROCEDURE — 82950 GLUCOSE TEST: CPT | Performed by: OBSTETRICS & GYNECOLOGY

## 2019-01-09 PROCEDURE — 36415 COLL VENOUS BLD VENIPUNCTURE: CPT | Performed by: OBSTETRICS & GYNECOLOGY

## 2019-01-09 PROCEDURE — 99207 ZZC PRENATAL VISIT: CPT | Performed by: OBSTETRICS & GYNECOLOGY

## 2019-01-09 PROCEDURE — 00000218 ZZHCL STATISTIC OBHBG - HEMOGLOBIN: Performed by: OBSTETRICS & GYNECOLOGY

## 2019-01-09 ASSESSMENT — PATIENT HEALTH QUESTIONNAIRE - PHQ9: SUM OF ALL RESPONSES TO PHQ QUESTIONS 1-9: 3

## 2019-01-09 NOTE — PROGRESS NOTES
Finally has appetite, feels good.  F/u images of fetal bladder looked good.  Doing 1 hr pc.  RTC 3 weeks.  LT

## 2019-02-06 ENCOUNTER — PRENATAL OFFICE VISIT (OUTPATIENT)
Dept: OBGYN | Facility: CLINIC | Age: 29
End: 2019-02-06
Payer: COMMERCIAL

## 2019-02-06 VITALS
DIASTOLIC BLOOD PRESSURE: 62 MMHG | BODY MASS INDEX: 27.84 KG/M2 | WEIGHT: 188 LBS | SYSTOLIC BLOOD PRESSURE: 96 MMHG | HEART RATE: 87 BPM

## 2019-02-06 DIAGNOSIS — L30.8 OTHER ECZEMA: ICD-10-CM

## 2019-02-06 DIAGNOSIS — Z23 NEED FOR TDAP VACCINATION: Primary | ICD-10-CM

## 2019-02-06 PROCEDURE — 99207 ZZC PRENATAL VISIT: CPT | Performed by: OBSTETRICS & GYNECOLOGY

## 2019-02-06 PROCEDURE — 90715 TDAP VACCINE 7 YRS/> IM: CPT | Performed by: OBSTETRICS & GYNECOLOGY

## 2019-02-06 PROCEDURE — 90471 IMMUNIZATION ADMIN: CPT | Performed by: OBSTETRICS & GYNECOLOGY

## 2019-02-06 RX ORDER — TRIAMCINOLONE ACETONIDE 1 MG/G
CREAM TOPICAL 2 TIMES DAILY
Qty: 80 G | Refills: 1 | Status: SHIPPED | OUTPATIENT
Start: 2019-02-06 | End: 2020-02-06

## 2019-02-06 NOTE — PROGRESS NOTES
Marisa has a long term itchy rash on shins, dorsal aspect of feet. She is a redhead with very sensitive skin.  She saw a dermatologist @ [facility] in 2015 (when pregnant with first child) who diagnosed it as eczema, treated it with triamcinolone cream and it worked well.   She will avoid irritants (detergent).   Passed 1 hr glucose.  RTC 2 weeks.  LT

## 2019-02-21 ENCOUNTER — PRENATAL OFFICE VISIT (OUTPATIENT)
Dept: OBGYN | Facility: CLINIC | Age: 29
End: 2019-02-21
Payer: COMMERCIAL

## 2019-02-21 VITALS
WEIGHT: 190 LBS | DIASTOLIC BLOOD PRESSURE: 59 MMHG | TEMPERATURE: 97.3 F | HEART RATE: 86 BPM | SYSTOLIC BLOOD PRESSURE: 103 MMHG | BODY MASS INDEX: 28.14 KG/M2

## 2019-02-21 DIAGNOSIS — Z34.83 ENCOUNTER FOR SUPERVISION OF OTHER NORMAL PREGNANCY IN THIRD TRIMESTER: Primary | ICD-10-CM

## 2019-02-21 PROCEDURE — 99207 ZZC PRENATAL VISIT: CPT | Performed by: OBSTETRICS & GYNECOLOGY

## 2019-02-21 NOTE — PROGRESS NOTES
Doing well, baby active.  Rash is improved, less itchy.  Maternity support belt prescribed.  OK to travel to [place] at 34 weeks.  RTC 2 weeks.  LT

## 2019-03-06 ENCOUNTER — OFFICE VISIT (OUTPATIENT)
Dept: OBGYN | Facility: CLINIC | Age: 29
End: 2019-03-06
Payer: COMMERCIAL

## 2019-03-06 VITALS
SYSTOLIC BLOOD PRESSURE: 107 MMHG | WEIGHT: 182 LBS | BODY MASS INDEX: 26.95 KG/M2 | HEART RATE: 87 BPM | DIASTOLIC BLOOD PRESSURE: 64 MMHG

## 2019-03-06 DIAGNOSIS — Z34.83 ENCOUNTER FOR SUPERVISION OF OTHER NORMAL PREGNANCY IN THIRD TRIMESTER: Primary | ICD-10-CM

## 2019-03-06 PROCEDURE — 99207 ZZC PRENATAL VISIT: CPT | Performed by: OBSTETRICS & GYNECOLOGY

## 2019-03-06 NOTE — PROGRESS NOTES
Other than sciatica, doing well.  Note to work (limit hours to 8/day).  She was ill with gi virus along with kids this weekend; recovered now.  RTC 2 weeks.  LT

## 2019-03-06 NOTE — LETTER
[address] [address]  [phone]      March 6, 2019      Sophie Felton  [address] [place] [address]              To Whom It May Concern:    Sophie Felton is being seen in our clinic for prenatal care.  Her Estimated Date of Delivery: Apr 22, 2019.  She should work no more than 8 hours per day and 40 hours per week for the duration of her pregnancy.       Sincerely,          Chasidy Stacy MD

## 2019-03-20 ENCOUNTER — PRENATAL OFFICE VISIT (OUTPATIENT)
Dept: FAMILY MEDICINE | Facility: CLINIC | Age: 29
End: 2019-03-20
Payer: COMMERCIAL

## 2019-03-20 VITALS
HEIGHT: 69 IN | DIASTOLIC BLOOD PRESSURE: 69 MMHG | BODY MASS INDEX: 27.44 KG/M2 | OXYGEN SATURATION: 97 % | WEIGHT: 185.3 LBS | SYSTOLIC BLOOD PRESSURE: 109 MMHG | TEMPERATURE: 98 F | HEART RATE: 92 BPM

## 2019-03-20 DIAGNOSIS — F32.0 MILD MAJOR DEPRESSION (H): ICD-10-CM

## 2019-03-20 DIAGNOSIS — Z34.03 ENCOUNTER FOR SUPERVISION OF NORMAL FIRST PREGNANCY IN THIRD TRIMESTER: Primary | ICD-10-CM

## 2019-03-20 PROCEDURE — 59025 FETAL NON-STRESS TEST: CPT | Performed by: FAMILY MEDICINE

## 2019-03-20 PROCEDURE — 99207 ZZC PRENATAL VISIT: CPT | Performed by: FAMILY MEDICINE

## 2019-03-20 RX ORDER — BUSPIRONE HYDROCHLORIDE 5 MG/1
TABLET ORAL
COMMUNITY
Start: 2019-02-16 | End: 2024-03-20

## 2019-03-20 RX ORDER — ESCITALOPRAM OXALATE 20 MG/1
20 TABLET ORAL DAILY
Qty: 30 TABLET | Refills: 0 | Status: ON HOLD | OUTPATIENT
Start: 2019-03-20 | End: 2019-04-16

## 2019-03-20 ASSESSMENT — MIFFLIN-ST. JEOR: SCORE: 1624.9

## 2019-03-26 NOTE — PROGRESS NOTES
Sophie Felton is a 29 year old female who presents with 35 week pregnancy no concerns    Denies LOF, VB CTNS  Good FM  Fetal heart tones noted to be lower than usual today - prior FHT noted in 135-140s  Today 110s- low 120s  No concerns per pt  Ate few hours before this viist    Procedure:  NST done for change in FHT - normal category 1 tracing with good baseline variability, accelerations and good fetal movement noted consistent with accels  Fetal heart rate was 125-130 on monitor    Due for: no screens today  Anticipatory guidance reviewed - follow fetal movement and kicks and activity and other warning signs reviewed  Reassuring NST today  RTC 1 week - will need GBS, HGB labs  SN

## 2019-03-28 ENCOUNTER — PRENATAL OFFICE VISIT (OUTPATIENT)
Dept: MIDWIFE SERVICES | Facility: CLINIC | Age: 29
End: 2019-03-28
Payer: COMMERCIAL

## 2019-03-28 VITALS
BODY MASS INDEX: 27.47 KG/M2 | DIASTOLIC BLOOD PRESSURE: 70 MMHG | WEIGHT: 186 LBS | OXYGEN SATURATION: 98 % | SYSTOLIC BLOOD PRESSURE: 112 MMHG | HEART RATE: 99 BPM

## 2019-03-28 DIAGNOSIS — Z34.83 ENCOUNTER FOR SUPERVISION OF OTHER NORMAL PREGNANCY IN THIRD TRIMESTER: Primary | ICD-10-CM

## 2019-03-28 LAB — HGB BLD-MCNC: 12 G/DL (ref 11.7–15.7)

## 2019-03-28 PROCEDURE — 36416 COLLJ CAPILLARY BLOOD SPEC: CPT | Performed by: ADVANCED PRACTICE MIDWIFE

## 2019-03-28 PROCEDURE — 99207 ZZC PRENATAL VISIT: CPT | Performed by: ADVANCED PRACTICE MIDWIFE

## 2019-03-28 PROCEDURE — 87653 STREP B DNA AMP PROBE: CPT | Performed by: ADVANCED PRACTICE MIDWIFE

## 2019-03-28 PROCEDURE — 00000218 ZZHCL STATISTIC OBHBG - HEMOGLOBIN: Performed by: ADVANCED PRACTICE MIDWIFE

## 2019-03-28 NOTE — PROGRESS NOTES
36w3d  Patient feeling well. Positive fetal movement. Denies water leaking, vaginal bleeding, decreased fetal movement, contraction pain, or headaches.   Doing well but uncomfortable. This has been the hardest pregnancy so far. Ready to be done. Planning to use tub and nitrous for pain management. Does not desire water birth. Open to an epidural if needed but has not needed it with her other two deliveries. Feels like she is really good at getting her body to relax and feels confident in the process. Has good support from her  throughout the labor.   No questions or concerns today. GBS and HGB today. Requested cervical check 1/50/-3 intact.   Danger signs reviewed, pre-eclampsia signs and symptoms discussed.   Knows when to call triage and has phone numbers.   Follow up in 1 week.   Kaela Diaz CNM

## 2019-03-29 LAB
GP B STREP DNA SPEC QL NAA+PROBE: NEGATIVE
SPECIMEN SOURCE: NORMAL

## 2019-04-03 ENCOUNTER — PRENATAL OFFICE VISIT (OUTPATIENT)
Dept: OBGYN | Facility: CLINIC | Age: 29
End: 2019-04-03
Payer: COMMERCIAL

## 2019-04-03 VITALS
BODY MASS INDEX: 27.76 KG/M2 | SYSTOLIC BLOOD PRESSURE: 112 MMHG | DIASTOLIC BLOOD PRESSURE: 72 MMHG | HEART RATE: 96 BPM | WEIGHT: 188 LBS

## 2019-04-03 DIAGNOSIS — Z34.03 ENCOUNTER FOR SUPERVISION OF NORMAL FIRST PREGNANCY IN THIRD TRIMESTER: Primary | ICD-10-CM

## 2019-04-03 PROCEDURE — 99207 ZZC PRENATAL VISIT: CPT | Performed by: OBSTETRICS & GYNECOLOGY

## 2019-04-03 NOTE — LETTER
[address] [address]  [phone]  Dept: [phone]    April 3 2019     Re:      To Whom it May Concern:    Sophie Felton is being seen in our clinic for prenatal care.    I have advised her to discontinue working after April 4 2019.    If you have any questions in this matter please don't hesitate to contact me.        Sincerely,      Chasidy Stacy MD  [facility]

## 2019-04-03 NOTE — LETTER
[address] [address]  [phone]      April 3, 2019      Sophie Felton  [address] [place] [address]              To Whom It May Concern:    Sophie Felton is being seen in our clinic for prenatal care.  Her Estimated Date of Delivery: Apr 22, 2019.      She is advised to stop working after May 4 2019.  If you have any questions in this matter please don't hesitate to contact me.        Sincerely,        Chasidy Stacy MD

## 2019-04-03 NOTE — PROGRESS NOTES
Good fetal movement.  Has a lot of round ligament pain this pregnancy; ready to be done.  Has maternity belt; doesn't help a lot. Discussed, reassured.  RTC one week.  LT

## 2019-04-08 ENCOUNTER — PRENATAL OFFICE VISIT (OUTPATIENT)
Dept: OBGYN | Facility: CLINIC | Age: 29
End: 2019-04-08
Payer: COMMERCIAL

## 2019-04-08 VITALS
DIASTOLIC BLOOD PRESSURE: 70 MMHG | WEIGHT: 187 LBS | HEART RATE: 79 BPM | BODY MASS INDEX: 27.62 KG/M2 | SYSTOLIC BLOOD PRESSURE: 118 MMHG

## 2019-04-08 DIAGNOSIS — Z34.03 ENCOUNTER FOR SUPERVISION OF NORMAL FIRST PREGNANCY IN THIRD TRIMESTER: Primary | ICD-10-CM

## 2019-04-08 PROCEDURE — 99207 ZZC PRENATAL VISIT: CPT | Performed by: OBSTETRICS & GYNECOLOGY

## 2019-04-08 NOTE — PROGRESS NOTES
Feeling much better now that off work.  Notes good fetal movement.    Has some sinus congestion, suggest tylenol, sudafed and netti pot. Cervical check next week.  RTC one week.  LT

## 2019-04-16 ENCOUNTER — PRENATAL OFFICE VISIT (OUTPATIENT)
Dept: OBGYN | Facility: CLINIC | Age: 29
End: 2019-04-16
Payer: COMMERCIAL

## 2019-04-16 VITALS
DIASTOLIC BLOOD PRESSURE: 69 MMHG | WEIGHT: 191.8 LBS | OXYGEN SATURATION: 99 % | BODY MASS INDEX: 28.41 KG/M2 | HEIGHT: 69 IN | SYSTOLIC BLOOD PRESSURE: 120 MMHG | HEART RATE: 78 BPM

## 2019-04-16 DIAGNOSIS — Z34.03 ENCOUNTER FOR SUPERVISION OF NORMAL FIRST PREGNANCY IN THIRD TRIMESTER: Primary | ICD-10-CM

## 2019-04-16 PROCEDURE — 99207 ZZC PRENATAL VISIT: CPT | Performed by: OBSTETRICS & GYNECOLOGY

## 2019-04-16 ASSESSMENT — MIFFLIN-ST. JEOR: SCORE: 1654.38

## 2019-04-16 NOTE — PROGRESS NOTES
39w1d   Very uncomfortable. Baby is moving a lot. Feels like it is sitting right on her bladder.    Lower back and hips hurt.  On exam the head is really low in her pelvis.    Cervix is favorable with a reyes's score of 8.    Patient requesting induction tomorrow.  Scheduled with labor and delivery.  RR

## 2019-04-17 ENCOUNTER — HOSPITAL ENCOUNTER (INPATIENT)
Facility: CLINIC | Age: 29
LOS: 2 days | Discharge: HOME HEALTH | End: 2019-04-19
Attending: OBSTETRICS & GYNECOLOGY | Admitting: OBSTETRICS & GYNECOLOGY
Payer: COMMERCIAL

## 2019-04-17 ENCOUNTER — TELEPHONE (OUTPATIENT)
Dept: OBGYN | Facility: CLINIC | Age: 29
End: 2019-04-17

## 2019-04-17 ENCOUNTER — APPOINTMENT (OUTPATIENT)
Dept: GENERAL RADIOLOGY | Facility: CLINIC | Age: 29
End: 2019-04-17
Attending: OBSTETRICS & GYNECOLOGY
Payer: COMMERCIAL

## 2019-04-17 PROBLEM — Z3A.39 39 WEEKS GESTATION OF PREGNANCY: Status: ACTIVE | Noted: 2019-04-17

## 2019-04-17 LAB
ABO + RH BLD: ABNORMAL
ABO + RH BLD: ABNORMAL
BASOPHILS # BLD AUTO: 0 10E9/L (ref 0–0.2)
BASOPHILS NFR BLD AUTO: 0.1 %
BLD GP AB INVEST PLASRBC-IMP: ABNORMAL
BLD GP AB SCN SERPL QL: ABNORMAL
BLD PROD TYP BPU: ABNORMAL
BLOOD BANK CMNT PATIENT-IMP: ABNORMAL
DIFFERENTIAL METHOD BLD: ABNORMAL
EOSINOPHIL # BLD AUTO: 0.1 10E9/L (ref 0–0.7)
EOSINOPHIL NFR BLD AUTO: 0.5 %
ERYTHROCYTE [DISTWIDTH] IN BLOOD BY AUTOMATED COUNT: 13.3 % (ref 10–15)
HCT VFR BLD AUTO: 33.9 % (ref 35–47)
HGB BLD-MCNC: 10.9 G/DL (ref 11.7–15.7)
IMM GRANULOCYTES # BLD: 0.2 10E9/L (ref 0–0.4)
IMM GRANULOCYTES NFR BLD: 1.2 %
LYMPHOCYTES # BLD AUTO: 1.5 10E9/L (ref 0.8–5.3)
LYMPHOCYTES NFR BLD AUTO: 11.6 %
MCH RBC QN AUTO: 27.5 PG (ref 26.5–33)
MCHC RBC AUTO-ENTMCNC: 32.2 G/DL (ref 31.5–36.5)
MCV RBC AUTO: 86 FL (ref 78–100)
MONOCYTES # BLD AUTO: 1 10E9/L (ref 0–1.3)
MONOCYTES NFR BLD AUTO: 7.5 %
NEUTROPHILS # BLD AUTO: 10 10E9/L (ref 1.6–8.3)
NEUTROPHILS NFR BLD AUTO: 79.1 %
NRBC # BLD AUTO: 0 10*3/UL
NRBC BLD AUTO-RTO: 0 /100
NUM BPU REQUESTED: 2
PLATELET # BLD AUTO: 348 10E9/L (ref 150–450)
RBC # BLD AUTO: 3.96 10E12/L (ref 3.8–5.2)
SPECIMEN EXP DATE BLD: ABNORMAL
T PALLIDUM AB SER QL: NONREACTIVE
WBC # BLD AUTO: 12.7 10E9/L (ref 4–11)

## 2019-04-17 PROCEDURE — 86902 BLOOD TYPE ANTIGEN DONOR EA: CPT | Performed by: STUDENT IN AN ORGANIZED HEALTH CARE EDUCATION/TRAINING PROGRAM

## 2019-04-17 PROCEDURE — 86900 BLOOD TYPING SEROLOGIC ABO: CPT | Performed by: STUDENT IN AN ORGANIZED HEALTH CARE EDUCATION/TRAINING PROGRAM

## 2019-04-17 PROCEDURE — 25000132 ZZH RX MED GY IP 250 OP 250 PS 637

## 2019-04-17 PROCEDURE — 25000132 ZZH RX MED GY IP 250 OP 250 PS 637: Performed by: OBSTETRICS & GYNECOLOGY

## 2019-04-17 PROCEDURE — 25000125 ZZHC RX 250: Performed by: STUDENT IN AN ORGANIZED HEALTH CARE EDUCATION/TRAINING PROGRAM

## 2019-04-17 PROCEDURE — 25000128 H RX IP 250 OP 636: Performed by: OBSTETRICS & GYNECOLOGY

## 2019-04-17 PROCEDURE — 0UQC7ZZ REPAIR CERVIX, VIA NATURAL OR ARTIFICIAL OPENING: ICD-10-PCS | Performed by: OBSTETRICS & GYNECOLOGY

## 2019-04-17 PROCEDURE — 86850 RBC ANTIBODY SCREEN: CPT | Performed by: STUDENT IN AN ORGANIZED HEALTH CARE EDUCATION/TRAINING PROGRAM

## 2019-04-17 PROCEDURE — 86922 COMPATIBILITY TEST ANTIGLOB: CPT | Performed by: STUDENT IN AN ORGANIZED HEALTH CARE EDUCATION/TRAINING PROGRAM

## 2019-04-17 PROCEDURE — 12000001 ZZH R&B MED SURG/OB UMMC

## 2019-04-17 PROCEDURE — 74018 RADEX ABDOMEN 1 VIEW: CPT

## 2019-04-17 PROCEDURE — 86905 BLOOD TYPING RBC ANTIGENS: CPT | Performed by: STUDENT IN AN ORGANIZED HEALTH CARE EDUCATION/TRAINING PROGRAM

## 2019-04-17 PROCEDURE — 25000128 H RX IP 250 OP 636: Performed by: STUDENT IN AN ORGANIZED HEALTH CARE EDUCATION/TRAINING PROGRAM

## 2019-04-17 PROCEDURE — 86870 RBC ANTIBODY IDENTIFICATION: CPT | Performed by: STUDENT IN AN ORGANIZED HEALTH CARE EDUCATION/TRAINING PROGRAM

## 2019-04-17 PROCEDURE — 59400 OBSTETRICAL CARE: CPT | Mod: 22 | Performed by: OBSTETRICS & GYNECOLOGY

## 2019-04-17 PROCEDURE — 25000132 ZZH RX MED GY IP 250 OP 250 PS 637: Performed by: STUDENT IN AN ORGANIZED HEALTH CARE EDUCATION/TRAINING PROGRAM

## 2019-04-17 PROCEDURE — 85025 COMPLETE CBC W/AUTO DIFF WBC: CPT | Performed by: STUDENT IN AN ORGANIZED HEALTH CARE EDUCATION/TRAINING PROGRAM

## 2019-04-17 PROCEDURE — 86901 BLOOD TYPING SEROLOGIC RH(D): CPT | Performed by: STUDENT IN AN ORGANIZED HEALTH CARE EDUCATION/TRAINING PROGRAM

## 2019-04-17 PROCEDURE — 72200001 ZZH LABOR CARE VAGINAL DELIVERY SINGLE

## 2019-04-17 PROCEDURE — 10907ZC DRAINAGE OF AMNIOTIC FLUID, THERAPEUTIC FROM PRODUCTS OF CONCEPTION, VIA NATURAL OR ARTIFICIAL OPENING: ICD-10-PCS | Performed by: OBSTETRICS & GYNECOLOGY

## 2019-04-17 PROCEDURE — 25800030 ZZH RX IP 258 OP 636: Performed by: STUDENT IN AN ORGANIZED HEALTH CARE EDUCATION/TRAINING PROGRAM

## 2019-04-17 PROCEDURE — 0064U ANTB TP TOTAL&RPR IA QUAL: CPT | Performed by: STUDENT IN AN ORGANIZED HEALTH CARE EDUCATION/TRAINING PROGRAM

## 2019-04-17 RX ORDER — OXYTOCIN 10 [USP'U]/ML
10 INJECTION, SOLUTION INTRAMUSCULAR; INTRAVENOUS
Status: DISCONTINUED | OUTPATIENT
Start: 2019-04-17 | End: 2019-04-18

## 2019-04-17 RX ORDER — OXYTOCIN/0.9 % SODIUM CHLORIDE 30/500 ML
PLASTIC BAG, INJECTION (ML) INTRAVENOUS
Status: DISCONTINUED
Start: 2019-04-17 | End: 2019-04-18 | Stop reason: HOSPADM

## 2019-04-17 RX ORDER — LIDOCAINE 40 MG/G
CREAM TOPICAL
Status: DISCONTINUED | OUTPATIENT
Start: 2019-04-17 | End: 2019-04-17

## 2019-04-17 RX ORDER — NALBUPHINE HYDROCHLORIDE 10 MG/ML
10-20 INJECTION, SOLUTION INTRAMUSCULAR; INTRAVENOUS; SUBCUTANEOUS
Status: DISCONTINUED | OUTPATIENT
Start: 2019-04-17 | End: 2019-04-18

## 2019-04-17 RX ORDER — OXYTOCIN 10 [USP'U]/ML
INJECTION, SOLUTION INTRAMUSCULAR; INTRAVENOUS
Status: DISCONTINUED
Start: 2019-04-17 | End: 2019-04-18 | Stop reason: WASHOUT

## 2019-04-17 RX ORDER — ESCITALOPRAM OXALATE 20 MG/1
20 TABLET ORAL DAILY
Status: DISCONTINUED | OUTPATIENT
Start: 2019-04-17 | End: 2019-04-18

## 2019-04-17 RX ORDER — ONDANSETRON 2 MG/ML
4 INJECTION INTRAMUSCULAR; INTRAVENOUS EVERY 6 HOURS PRN
Status: DISCONTINUED | OUTPATIENT
Start: 2019-04-17 | End: 2019-04-18

## 2019-04-17 RX ORDER — METOCLOPRAMIDE HYDROCHLORIDE 5 MG/ML
10 INJECTION INTRAMUSCULAR; INTRAVENOUS EVERY 6 HOURS PRN
Status: DISCONTINUED | OUTPATIENT
Start: 2019-04-17 | End: 2019-04-18

## 2019-04-17 RX ORDER — IBUPROFEN 800 MG/1
800 TABLET, FILM COATED ORAL
Status: COMPLETED | OUTPATIENT
Start: 2019-04-17 | End: 2019-04-17

## 2019-04-17 RX ORDER — LIDOCAINE 40 MG/G
CREAM TOPICAL
Status: DISCONTINUED | OUTPATIENT
Start: 2019-04-17 | End: 2019-04-18

## 2019-04-17 RX ORDER — CARBOPROST TROMETHAMINE 250 UG/ML
250 INJECTION, SOLUTION INTRAMUSCULAR
Status: DISCONTINUED | OUTPATIENT
Start: 2019-04-17 | End: 2019-04-18

## 2019-04-17 RX ORDER — CITRIC ACID/SODIUM CITRATE 334-500MG
30 SOLUTION, ORAL ORAL ONCE
Status: DISCONTINUED | OUTPATIENT
Start: 2019-04-17 | End: 2019-04-18

## 2019-04-17 RX ORDER — BUSPIRONE HYDROCHLORIDE 5 MG/1
5 TABLET ORAL AT BEDTIME
Status: DISCONTINUED | OUTPATIENT
Start: 2019-04-17 | End: 2019-04-19 | Stop reason: HOSPADM

## 2019-04-17 RX ORDER — MISOPROSTOL 200 UG/1
400 TABLET ORAL
Status: COMPLETED | OUTPATIENT
Start: 2019-04-17 | End: 2019-04-17

## 2019-04-17 RX ORDER — ACETAMINOPHEN 325 MG/1
650 TABLET ORAL EVERY 4 HOURS PRN
Status: DISCONTINUED | OUTPATIENT
Start: 2019-04-17 | End: 2019-04-18

## 2019-04-17 RX ORDER — MISOPROSTOL 100 UG/1
25 TABLET ORAL ONCE
Status: COMPLETED | OUTPATIENT
Start: 2019-04-17 | End: 2019-04-17

## 2019-04-17 RX ORDER — TERBUTALINE SULFATE 1 MG/ML
0.25 INJECTION, SOLUTION SUBCUTANEOUS
Status: DISCONTINUED | OUTPATIENT
Start: 2019-04-17 | End: 2019-04-18

## 2019-04-17 RX ORDER — LORATADINE 10 MG/1
20 TABLET ORAL DAILY
Status: DISCONTINUED | OUTPATIENT
Start: 2019-04-17 | End: 2019-04-18

## 2019-04-17 RX ORDER — OXYTOCIN/0.9 % SODIUM CHLORIDE 30/500 ML
100 PLASTIC BAG, INJECTION (ML) INTRAVENOUS CONTINUOUS
Status: DISCONTINUED | OUTPATIENT
Start: 2019-04-17 | End: 2019-04-19 | Stop reason: HOSPADM

## 2019-04-17 RX ORDER — OXYTOCIN/0.9 % SODIUM CHLORIDE 30/500 ML
100-340 PLASTIC BAG, INJECTION (ML) INTRAVENOUS CONTINUOUS PRN
Status: COMPLETED | OUTPATIENT
Start: 2019-04-17 | End: 2019-04-17

## 2019-04-17 RX ORDER — SODIUM CHLORIDE, SODIUM LACTATE, POTASSIUM CHLORIDE, CALCIUM CHLORIDE 600; 310; 30; 20 MG/100ML; MG/100ML; MG/100ML; MG/100ML
INJECTION, SOLUTION INTRAVENOUS CONTINUOUS
Status: DISCONTINUED | OUTPATIENT
Start: 2019-04-17 | End: 2019-04-18

## 2019-04-17 RX ORDER — METHYLERGONOVINE MALEATE 0.2 MG/ML
200 INJECTION INTRAVENOUS
Status: DISCONTINUED | OUTPATIENT
Start: 2019-04-17 | End: 2019-04-18

## 2019-04-17 RX ORDER — LIDOCAINE HYDROCHLORIDE 10 MG/ML
INJECTION, SOLUTION EPIDURAL; INFILTRATION; INTRACAUDAL; PERINEURAL
Status: DISCONTINUED
Start: 2019-04-17 | End: 2019-04-18 | Stop reason: HOSPADM

## 2019-04-17 RX ORDER — CEFAZOLIN SODIUM 2 G/100ML
2 INJECTION, SOLUTION INTRAVENOUS ONCE
Status: COMPLETED | OUTPATIENT
Start: 2019-04-17 | End: 2019-04-18

## 2019-04-17 RX ORDER — NALBUPHINE HYDROCHLORIDE 10 MG/ML
10-20 INJECTION, SOLUTION INTRAMUSCULAR; INTRAVENOUS; SUBCUTANEOUS
Status: DISCONTINUED | OUTPATIENT
Start: 2019-04-17 | End: 2019-04-17

## 2019-04-17 RX ORDER — OXYCODONE AND ACETAMINOPHEN 5; 325 MG/1; MG/1
1 TABLET ORAL
Status: DISCONTINUED | OUTPATIENT
Start: 2019-04-17 | End: 2019-04-18

## 2019-04-17 RX ORDER — FENTANYL CITRATE 50 UG/ML
50-100 INJECTION, SOLUTION INTRAMUSCULAR; INTRAVENOUS
Status: DISCONTINUED | OUTPATIENT
Start: 2019-04-17 | End: 2019-04-18

## 2019-04-17 RX ORDER — MISOPROSTOL 200 UG/1
TABLET ORAL
Status: COMPLETED
Start: 2019-04-17 | End: 2019-04-17

## 2019-04-17 RX ORDER — NALOXONE HYDROCHLORIDE 0.4 MG/ML
.1-.4 INJECTION, SOLUTION INTRAMUSCULAR; INTRAVENOUS; SUBCUTANEOUS
Status: DISCONTINUED | OUTPATIENT
Start: 2019-04-17 | End: 2019-04-18

## 2019-04-17 RX ORDER — OXYTOCIN/0.9 % SODIUM CHLORIDE 30/500 ML
1-24 PLASTIC BAG, INJECTION (ML) INTRAVENOUS CONTINUOUS
Status: DISCONTINUED | OUTPATIENT
Start: 2019-04-17 | End: 2019-04-18

## 2019-04-17 RX ORDER — MISOPROSTOL 100 UG/1
25 TABLET ORAL ONCE
Status: DISCONTINUED | OUTPATIENT
Start: 2019-04-17 | End: 2019-04-17

## 2019-04-17 RX ADMIN — MISOPROSTOL 400 MCG: 200 TABLET ORAL at 21:29

## 2019-04-17 RX ADMIN — BUSPIRONE HYDROCHLORIDE 5 MG: 5 TABLET ORAL at 23:45

## 2019-04-17 RX ADMIN — FENTANYL CITRATE 50 MCG: 50 INJECTION INTRAMUSCULAR; INTRAVENOUS at 21:51

## 2019-04-17 RX ADMIN — Medication 25 MCG: at 10:01

## 2019-04-17 RX ADMIN — CEFAZOLIN SODIUM 2 G: 2 INJECTION, SOLUTION INTRAVENOUS at 23:45

## 2019-04-17 RX ADMIN — IBUPROFEN 800 MG: 800 TABLET, FILM COATED ORAL at 23:04

## 2019-04-17 RX ADMIN — ESCITALOPRAM OXALATE 20 MG: 20 TABLET ORAL at 23:04

## 2019-04-17 RX ADMIN — OXYTOCIN-SODIUM CHLORIDE 0.9% IV SOLN 30 UNIT/500ML 2 MILLI-UNITS/MIN: 30-0.9/5 SOLUTION at 14:18

## 2019-04-17 RX ADMIN — ACETAMINOPHEN 650 MG: 325 TABLET, FILM COATED ORAL at 13:22

## 2019-04-17 RX ADMIN — FENTANYL CITRATE 50 MCG: 50 INJECTION INTRAMUSCULAR; INTRAVENOUS at 21:31

## 2019-04-17 RX ADMIN — OXYTOCIN-SODIUM CHLORIDE 0.9% IV SOLN 30 UNIT/500ML 100 ML/HR: 30-0.9/5 SOLUTION at 22:50

## 2019-04-17 RX ADMIN — SODIUM CHLORIDE, POTASSIUM CHLORIDE, SODIUM LACTATE AND CALCIUM CHLORIDE 1000 ML: 600; 310; 30; 20 INJECTION, SOLUTION INTRAVENOUS at 14:19

## 2019-04-17 RX ADMIN — OXYTOCIN-SODIUM CHLORIDE 0.9% IV SOLN 30 UNIT/500ML 340 ML/HR: 30-0.9/5 SOLUTION at 21:24

## 2019-04-17 RX ADMIN — FENTANYL CITRATE 50 MCG: 50 INJECTION INTRAMUSCULAR; INTRAVENOUS at 21:37

## 2019-04-17 NOTE — PROVIDER NOTIFICATION
04/17/19 1415   Provider Notification   Provider Name/Title Dr. Matias   Method of Notification Phone   Notification Reason Status Update     Given update that pt had positive antibody on type and screen, type and cross in progress.   Pitocin to be started now. Pt reports occasional contractions that are becoming more uncomfortable.   Pt reports migraine aura. Given cola with caffeine, tylenol. Now reports visual aura and headache relieved.

## 2019-04-17 NOTE — PLAN OF CARE
Pt arrived for scheduled IOL. VSS. EFM and [place] applied. Admission assessment completed. MD Reid and Florencio to bedside to establish plan of care. Vaginal misoprostol placed by MD Matias. Plan for pitocin 4 hours following misoprostol dose. Continue to monitor per protocol.

## 2019-04-17 NOTE — PROGRESS NOTES
Patient getting really uncomfortable.   Pitocin at 10 mu/min  toco ctx's q 2-3 min  Cervix 4-5 /80%/-2  At 1715  Membranes intact  discussed epidural vs nitrous .   Considering nitrous oxide   Expect .   Lulú Matias MD

## 2019-04-17 NOTE — TELEPHONE ENCOUNTER
Forms received and filled out. Placed in provider's box to be signed. Will email to patient once returned.    Leave to start on 4/6/19 per RR.     Sierra Londono,

## 2019-04-17 NOTE — H&P
"History and Physical     Sophie Felton MRN# [id number]   YOB: 1990 Age: 29 year old      Date of Admission: 19    Primary care provider: Giulia Godoy      Assessment and Plan:     29 year old  at 39w2d by LMP consistent with 12w6d US, here for elective induction of labor. This pregnancy has been uncomplicated, but is remarkable for more baseline abdominal discomfort, nausea, and heartburn.    # Induction of Labor  - Admit for elective IOL in the setting of increased pelvic discomfort in full term pregnancy  - Cervix: 3/80-2    - Given above Bishops score, will plan to start with vaginal misoprostol  - Membranes: Intact  - Augmentation: vaginal misoprostol x1, then will start IV pitocin  - Labs: CBC, T&S, RPR  - GBS negative; Antibiotics not indicated.  - Pain Control: Per patient request; Discussed options   - Diet: Regular until in active labor  - PPx: Ambulation until on bedrest, SCDs while in bed, IS    # PNC  - Rh +, Rubella immune, GCT normal, GBS negative, Hgb 12.0  - Other prenatal labs wnl  - Imagin18 US showed cephalic presentation with posterior/fundal placenta  - Up to date flu, Tdap vaccines   - Last PAP NIL 10/27/16  - Contraception:  planning vasectomy, will use condoms in the immediate postpartum period.  - Feeding: Plan for breastfeeding, willing to supplement as needed     # FWB:   Cat I tracing, cepahlic by BSUS; EFW 7.5lbs  - Continuous Fetal Monitoring  - Intrauterine resuscitative measures prn    # Depressive disorder  On escitalopram 20mg and buspirone 5mg daily.  - Continue escitalopram and buspirone    # Eczema  On bilateral shins. Uses triamcinolone 0.1% cream sporadically at home.  - Continue    Patient discussed with Dr. Matias          HPI:     Sophie \"Ct\" Jose Francisco is a 29 year old  at 39w2d by LMP c/w 12w6d US who presents today for elective induction of labor. She was seen at [facility] on  with complaints of " lower abdominal, back and hip pain. Fetus was found to be cephalic in the deep pelvis on exam, and cervix was favorable with a Orozco's score of 8.    This pregnancy has been uncomplicated, however, Ct has been having more pelvic discomfort for the past month compared to her prior pregnancies. She has also had persistent heartburn for which she has been taking TUMS, and nausea which she has used zofran intermittently for. She did vomit once this morning. Otherwise, she has recently had a sinus infection, which has not been treated with any antibiotics, and has been slowly improving.    She began experiencing more intense contractions this morning at ~0330, however, these are still somewhat mild, lasting seconds, and occurring about every 20 minutes. She continues to feel good fetal movement. She has not had any vaginal bleeding or gush of fluids, but has had a steady whitish vaginal discharge for weeks. She denies fever, chills, SOB, chest pain, palpitations, LE swelling/tenderness. No concerns for headache, vision changes, RUQ or epigastric pain.    Her previous pregnancies were unremarkable, and prior deliveries have been spontaneous vaginal. She has not used an epidural in prior pregnancies, but is open to all methods of pain control for this delivery. Denies history of postpartum hemorrhage, shoulder dystocia, pre-eclampsia, gestational diabetes. No history of asthma or high blood pressure.    OB History:    OB History    Para Term  AB Living   4 2 2 0 1 2   SAB TAB Ectopic Multiple Live Births   1 0 0 0 2      # Outcome Date GA Lbr Man/2nd Weight Sex Delivery Anes PTL Lv   4 Current            3 Term 17 40w2d 03:53 / 00:04 3.118 kg (6 lb 14 oz) F Vag-Spont Nitrous  JEFERSON      Birth Comments: Hearing screen:  passed  CHD screen: passed  Hep B in hospital: Yes  Low risk bili      Name: NATALIE RICHARDSON      Apgar1: 8  Apgar5: 9   2 Term 06/18/15 41w1d  3.345 kg (7 lb 6 oz) M  Vag-Spont   JEFERSON      Name: Delfino Yancey SAB               Prenatal Lab Results:  Lab Results   Component Value Date    ABO A 10/11/2018    RH Pos 10/11/2018    AS Neg 10/11/2018    HEPBANG Nonreactive 10/11/2018    CHPCRT negative 10/27/2016    GCPCRT negative 10/27/2016    TREPAB negative 10/27/2016    HGB 12.0 03/28/2019     GBS Status:   Lab Results   Component Value Date    GBS Negative 03/28/2019                Past Medical History:     Past Medical History:   Diagnosis Date     Depressive disorder Age 15    Anxiety and depression has worsened since swcond birth     Family history of colon cancer 7/12/2018          Past Surgical History:     Past Surgical History:   Procedure Laterality Date     EYE SURGERY  2010    LASIK          Social History:     Social History     Tobacco Use     Smoking status: Never Smoker     Smokeless tobacco: Never Used   Substance Use Topics     Alcohol use: No          Family History:     Family History   Problem Relation Age of Onset     Diabetes Brother      Substance Abuse Brother      Thyroid Disease Brother      Colon Cancer Father         age 62     Thyroid Disease Maternal Grandmother      Prostate Cancer Maternal Grandfather           Immunizations:     Immunization History   Administered Date(s) Administered     DTAP (<7y) 1990, 1990, 1990, 08/23/1990, 02/16/1994     Hep B, Peds or Adolescent 01/21/1999, 03/02/1999, 08/06/1999     HepA-ped 2 Dose 10/29/2002, 06/10/2004     Hib, Unspecified 08/23/1990     Hpv, Unspecified  01/25/2007, 06/04/2007, 07/17/2008     Influenza Vaccine IM 3yrs+ 4 Valent IIV4 10/28/2014, 10/27/2016, 11/12/2018     Influenza Vaccine, 3 YRS +, IM (QUADRIVALENT W/PRESERVATIVES) 10/10/2012     MMR 05/22/1990, 06/04/1998     Meningococcal (Menactra ) 01/25/2007     Polio, Unspecified  1989, 1989, 08/23/1990, 02/16/1994     TD (ADULT, 7+) 09/10/2000     TDAP Vaccine (Adacel) 03/06/2017, 02/06/2019     Tdap (Adult) Unspecified  Formulation 01/25/2007, 04/22/2009, 03/26/2015          Allergies:     Allergies   Allergen Reactions     Septra [Sulfamethoxazole W/Trimethoprim]      Sulfamethoxazole-Trimethoprim Hives     As a child          Medications:     Medications Prior to Admission   Medication Sig Dispense Refill Last Dose     busPIRone (BUSPAR) 5 MG tablet    4/16/2019 at 2000     Docusate Sodium (COLACE PO)    Past Month at 2000     escitalopram (LEXAPRO) 20 MG tablet Take 1 tablet (20 mg) by mouth daily 30 tablet 0 4/16/2019 at 2000     loratadine (CLARITIN) 10 MG tablet Take 20 mg by mouth daily   Past Month at 2000     ondansetron (ZOFRAN) 4 MG tablet Take 1 tablet (4 mg) by mouth every 8 hours as needed for nausea 20 tablet 1 Past Month at 2000      Prenatal Vit-Fe Fumarate-FA (PRENATAL MULTIVITAMIN  PLUS IRON) 27-0.8 MG TABS per tablet Take 1 tablet by mouth daily   Past Month at 2000     ammonium lactate (LAC-HYDRIN) 12 % lotion Apply topically 2 times daily as needed for dry skin (Patient not taking: Reported on 4/16/2019) 360 g 3 More than a month at 1000     triamcinolone (KENALOG) 0.1 % external cream Apply topically 2 times daily (Patient not taking: Reported on 4/16/2019) 80 g 1 More than a month at 2000          Review of Systems & Physical Exam:     The Review of Systems is negative other than noted in the HPI      /79   Temp 98.2  F (36.8  C) (Oral)   Resp 16   LMP 07/16/2018   Breastfeeding? No   Gen: Comfortably resting in bed, no acute distress.  CV: RRR, nl S1/S2, no murmurs appreciated. 2+ radial and pt pulses.  Lungs: CTAB, non-labored breathing on room air.  Abd: Gravid  Ext: Trace bilateral non-pitting peripheral extremity edema. Warm and well perfused.    Cervix: 3/80/-2  Membranes: Intact  Presentation: Cephalic  Estimated Fetal Weight: 7.5    FHT:  Monitoring External  FHT: Baseline 135 bpm; moderate variability; + accels present; no decelerations  TOCO Irritability          Data:   CBC, T&S,  Treponema antibodies pending    [place], MS3    Resident/Fellow Attestation   I, Santino Reid, was present with the medical student who participated in the service and in the documentation of the note.  I have verified the history and personally performed the physical exam and medical decision making.  I agree with the assessment and plan of care as documented in the note.  I have reviewed the note and made changes as necessary.    Key findings; Category 1, reactive. VSS. Ceph. 7.5 lbs    Santino Reid MD  Obstetrics & Gynecology, PGY2    Physician Attestation   I, Lulú Matias, personally examined and evaluated this patient.  I discussed the patient with the medical student and/or resident and care team, and agree with the assessment and plan of care as documented in the note of 2019.      I personally reviewed vital signs, medications and fetal heart tones and toco.    Key findings:  at 39w2d here for induction d/t favorable cervix, history of fast labor and maternal discomfort.   Fetal status is reassuring with reactive cat I tracing.  Cervix favorable.  Will give a single dose of cytotec to start ctx's and then augment with pitocin from there. I placed the cytotec at ~ 9 AM.   Procedure:   cytotec placed in posterior fornix at 9 AM without complication.  Patient tolerated it well.      MD Lulú Hollins MD  2019

## 2019-04-17 NOTE — PROVIDER NOTIFICATION
04/17/19 1300   Provider Notification   Provider Name/Title Dr. Bunn   Method of Notification Phone   Notification Reason Lab/Diagnostic Study     Notified of Anit-E antibody on type and screen. Per MD, will type and cross.

## 2019-04-18 LAB — HGB BLD-MCNC: 9.2 G/DL (ref 11.7–15.7)

## 2019-04-18 PROCEDURE — 12000001 ZZH R&B MED SURG/OB UMMC

## 2019-04-18 PROCEDURE — 85018 HEMOGLOBIN: CPT | Performed by: STUDENT IN AN ORGANIZED HEALTH CARE EDUCATION/TRAINING PROGRAM

## 2019-04-18 PROCEDURE — 25000132 ZZH RX MED GY IP 250 OP 250 PS 637: Performed by: STUDENT IN AN ORGANIZED HEALTH CARE EDUCATION/TRAINING PROGRAM

## 2019-04-18 PROCEDURE — 36415 COLL VENOUS BLD VENIPUNCTURE: CPT | Performed by: STUDENT IN AN ORGANIZED HEALTH CARE EDUCATION/TRAINING PROGRAM

## 2019-04-18 RX ORDER — HYDROCORTISONE 2.5 %
CREAM (GRAM) TOPICAL 3 TIMES DAILY PRN
Status: DISCONTINUED | OUTPATIENT
Start: 2019-04-18 | End: 2019-04-19 | Stop reason: HOSPADM

## 2019-04-18 RX ORDER — ACETAMINOPHEN 325 MG/1
650 TABLET ORAL EVERY 4 HOURS PRN
Status: DISCONTINUED | OUTPATIENT
Start: 2019-04-18 | End: 2019-04-18

## 2019-04-18 RX ORDER — OXYTOCIN/0.9 % SODIUM CHLORIDE 30/500 ML
340 PLASTIC BAG, INJECTION (ML) INTRAVENOUS CONTINUOUS PRN
Status: DISCONTINUED | OUTPATIENT
Start: 2019-04-18 | End: 2019-04-19 | Stop reason: HOSPADM

## 2019-04-18 RX ORDER — CARBOPROST TROMETHAMINE 250 UG/ML
250 INJECTION, SOLUTION INTRAMUSCULAR
Status: DISCONTINUED | OUTPATIENT
Start: 2019-04-18 | End: 2019-04-19 | Stop reason: HOSPADM

## 2019-04-18 RX ORDER — OXYTOCIN 10 [USP'U]/ML
10 INJECTION, SOLUTION INTRAMUSCULAR; INTRAVENOUS
Status: DISCONTINUED | OUTPATIENT
Start: 2019-04-18 | End: 2019-04-19 | Stop reason: HOSPADM

## 2019-04-18 RX ORDER — METHYLERGONOVINE MALEATE 0.2 MG/ML
200 INJECTION INTRAVENOUS
Status: DISCONTINUED | OUTPATIENT
Start: 2019-04-18 | End: 2019-04-19 | Stop reason: HOSPADM

## 2019-04-18 RX ORDER — BISACODYL 10 MG
10 SUPPOSITORY, RECTAL RECTAL DAILY PRN
Status: DISCONTINUED | OUTPATIENT
Start: 2019-04-19 | End: 2019-04-19 | Stop reason: HOSPADM

## 2019-04-18 RX ORDER — AMOXICILLIN 250 MG
2 CAPSULE ORAL 2 TIMES DAILY
Status: DISCONTINUED | OUTPATIENT
Start: 2019-04-18 | End: 2019-04-19 | Stop reason: HOSPADM

## 2019-04-18 RX ORDER — NALOXONE HYDROCHLORIDE 0.4 MG/ML
.1-.4 INJECTION, SOLUTION INTRAMUSCULAR; INTRAVENOUS; SUBCUTANEOUS
Status: DISCONTINUED | OUTPATIENT
Start: 2019-04-18 | End: 2019-04-19 | Stop reason: HOSPADM

## 2019-04-18 RX ORDER — ESCITALOPRAM OXALATE 20 MG/1
20 TABLET ORAL AT BEDTIME
Status: DISCONTINUED | OUTPATIENT
Start: 2019-04-18 | End: 2019-04-19 | Stop reason: HOSPADM

## 2019-04-18 RX ORDER — AMOXICILLIN 250 MG
1 CAPSULE ORAL 2 TIMES DAILY
Status: DISCONTINUED | OUTPATIENT
Start: 2019-04-18 | End: 2019-04-19 | Stop reason: HOSPADM

## 2019-04-18 RX ORDER — POLYETHYLENE GLYCOL 3350 17 G/17G
17 POWDER, FOR SOLUTION ORAL DAILY PRN
Status: DISCONTINUED | OUTPATIENT
Start: 2019-04-18 | End: 2019-04-19 | Stop reason: HOSPADM

## 2019-04-18 RX ORDER — ACETAMINOPHEN 325 MG/1
975 TABLET ORAL EVERY 6 HOURS PRN
Status: DISCONTINUED | OUTPATIENT
Start: 2019-04-18 | End: 2019-04-19 | Stop reason: HOSPADM

## 2019-04-18 RX ORDER — LANOLIN 100 %
OINTMENT (GRAM) TOPICAL
Status: DISCONTINUED | OUTPATIENT
Start: 2019-04-18 | End: 2019-04-19 | Stop reason: HOSPADM

## 2019-04-18 RX ORDER — IBUPROFEN 800 MG/1
800 TABLET, FILM COATED ORAL EVERY 6 HOURS PRN
Status: DISCONTINUED | OUTPATIENT
Start: 2019-04-18 | End: 2019-04-19 | Stop reason: HOSPADM

## 2019-04-18 RX ORDER — OXYCODONE HYDROCHLORIDE 5 MG/1
5 TABLET ORAL EVERY 6 HOURS PRN
Status: COMPLETED | OUTPATIENT
Start: 2019-04-18 | End: 2019-04-18

## 2019-04-18 RX ADMIN — Medication 2.5 MG: at 19:42

## 2019-04-18 RX ADMIN — IBUPROFEN 800 MG: 800 TABLET, FILM COATED ORAL at 11:28

## 2019-04-18 RX ADMIN — OXYCODONE HYDROCHLORIDE 5 MG: 5 TABLET ORAL at 00:57

## 2019-04-18 RX ADMIN — ACETAMINOPHEN 975 MG: 325 TABLET, FILM COATED ORAL at 15:57

## 2019-04-18 RX ADMIN — IBUPROFEN 800 MG: 800 TABLET, FILM COATED ORAL at 17:29

## 2019-04-18 RX ADMIN — IBUPROFEN 800 MG: 800 TABLET, FILM COATED ORAL at 05:12

## 2019-04-18 RX ADMIN — SENNOSIDES AND DOCUSATE SODIUM 1 TABLET: 8.6; 5 TABLET ORAL at 07:34

## 2019-04-18 RX ADMIN — OXYCODONE HYDROCHLORIDE 5 MG: 5 TABLET ORAL at 09:14

## 2019-04-18 RX ADMIN — ACETAMINOPHEN 975 MG: 325 TABLET, FILM COATED ORAL at 22:47

## 2019-04-18 RX ADMIN — IBUPROFEN 800 MG: 800 TABLET, FILM COATED ORAL at 23:38

## 2019-04-18 RX ADMIN — SENNOSIDES AND DOCUSATE SODIUM 1 TABLET: 8.6; 5 TABLET ORAL at 19:42

## 2019-04-18 RX ADMIN — ACETAMINOPHEN 975 MG: 325 TABLET, FILM COATED ORAL at 00:57

## 2019-04-18 RX ADMIN — ESCITALOPRAM OXALATE 20 MG: 20 TABLET ORAL at 22:47

## 2019-04-18 RX ADMIN — ACETAMINOPHEN 975 MG: 325 TABLET, FILM COATED ORAL at 09:14

## 2019-04-18 NOTE — PROGRESS NOTES
Called to room postpartum for missing lap count.   Used 1 small lap pack on the delivery table and then added a second lap pack for the cervical laceration repair.   Missing one small lap.   Room, garbages, laundry, and red pail all inspected by multiple people, multiple times.   Patient was moved to the chair and all sheets removed from the bed and the entire bed inspected and missing lap could not be found. I performed a gentle vaginal exam and no lap present.   Will have portable X ray done to confirm that missing lap is not in the patient.   Lulú Matias MD     Addendum ~    Portable Xray done of the abdomen and pelvis and no lap sponge noted.   Lulú Matias MD

## 2019-04-18 NOTE — PLAN OF CARE
Data: Sophie Felton transferred to 7121 via wheelchair at 0005. Baby transferred via parent's arms.  Action: Receiving unit notified of transfer: Yes. Patient and family notified of room change. Report given to Yasmin Doll RN at 0010. Belongings sent to receiving unit. Accompanied by Registered Nurse. Oriented patient to surroundings. Call light within reach. ID bands double-checked with receiving RN.  Response: Patient tolerated transfer and is stable.

## 2019-04-18 NOTE — PROGRESS NOTES
Postpartum Progress Note  Sophie Felton  [id number]    Subjective:   Ambulating without dizziness, eating and drinking without nausea. Lochia less than a typical period. Voiding spontaneously. No flatus, no BM - anxious about BM given cervical lac. Pain well controlled on oral medications. Breastfeeding without difficulty.    No headache, vision changes, CP, SOB, RUQ pain    Objective:  /82   Temp 98  F (36.7  C) (Oral)   Resp 16   LMP 2018   SpO2 99%   Breastfeeding? Unknown     General: NAD, comfortable  Heart: Regular rate, rhythm. Extremities warm and well-perfused  Lungs: Breathing comfortably, clear to auscultation bilaterally, on room air  Abdomen: Soft, non-tender, non-distended; fundus firm and 2 cm below umbilicus  Extremities: trace edema in BLE    Labs:  Hemoglobin   Date Value Ref Range Status   2019 10.9 (L) 11.7 - 15.7 g/dL Final     Hemoglobin   Date Value Ref Range Status   2019 10.9 (L) 11.7 - 15.7 g/dL Final   2019 12.0 11.7 - 15.7 g/dL Final       Assessment/Plan: 29 year old  who is PPD#1 s/p . Pregnancy was notable for depression. Delivery complicated by PPH and cervical laceration. Currently stable and doing well.     # Depression  - Continue PTA lexapro, buspirone     # Postpartum  - Continue routine post-partum cares.     - Heme: Hgb 10.9 > QBL 1000 ml > pending repeat Hgb today  - Pain: Continue scheduled ibuprofen, tylenol  - GI: scheduled senna, prn miralax daily, simethicone, anti-emetics, suppository  - : Voiding spontaneously  - Baby:  Stable, breast-feeding going well  - Contraception: vasectomy  - Rh positive, Rubella immune, GBS negative  - PPx: Encourage ambulation    Dispo: Discharge to home when meeting postpartum goals, likely tomorrow    Becka Fisher MD  [facility] Family Medicine PGY-1  Pager [phone]       Attestation:   This patient was seen and evaluated by me, separately from the house staff team. I have reviewed the  note/plan above and agree.     Doing well and using a few oxy for pain control due to uterine cramping. Up at bedside for rounds. Discontinue IV today and plan discharge home tomorrow.   Hemoglobin   Date Value Ref Range Status   04/18/2019 9.2 (L) 11.7 - 15.7 g/dL Final   ]  Fe for acute blood loss anemia from delivery.    Nicolasa Barone MD

## 2019-04-18 NOTE — LACTATION NOTE
This note was copied from a baby's chart.  Consult for: painful latch    Delivery Information: Baby Cheryle was born at 39.2 weeks via vaginal delivery on 19 at 2121.    Maternal Health History: Sophie has a history of depression.     Maternal Breast Exam: Breast growth noted in pregnancy and Sophie is easily able to express colostrum. Her breasts are soft and symmetrical with a small blister on her left nipple.    Sophie has been able to breast feed her other 2 children, but struggled with her last infant who had low muscle tone. She worked with the LCs at  [facility] and pumped and bottle fed expressed breast milk ?    Infant information:?Baby Cheryle has been waking to feed as expected and has age appropriate output. Her birthweight was 7lb 2/6 oz.     Cheryle has a palpable lingual frenulum was but visualized extending her tongue out to her lower lip. When sucking on my finger she is able to produce a coordinated suck with her tongue over the lower gumline, but switches back and forth from sucking to chomping.     Feeding Assessment: Sophie was able to independently position and latch Cheryle in the cross cradle position on the right breast. Cheryle appeared to have a functional, asymmetric latch and was heard swallowing throughout the feeding. Sophie denied pain, but her nipple was slightly compressed when Cheryle came off the breast.  Sophie was able to demonstrate good hand expression technique and has been hand expressing after feedings.     Education: breastfeeding positions, signs breastfeeding is going well (comfortable latch, age appropriate output and weight loss, swallowing heard at the breast), satiety cues, expected  output,  weight loss, nutritive vs non-nutritive sucking, benefits of skin to skin, the Second Night, benefits of breast massage and hand expression of colostrum, inpatient lactation support and outpatient lactation resources.        Plan: Continue  breastfeeding on cue with RN support as needed with a goal of 8-12 feedings per day. Encourage frequent skin to skin, breast massage and hand expression.     Lactation to reassess latch/nipple integrity and suck tomorrow prior to discharge.    Sophie will check with her insurance about breast pump coverage. She plans to again follow up with [facility] for [facility] support as needed.

## 2019-04-18 NOTE — PLAN OF CARE
Data: Vital signs and postpartum checks WDL  Patient eating and drinking normally. Patient able to empty bladder independently and is up ambulating.  Patient performing self cares and is able to care for infant. Breastfeeding on demand. Lactation saw pt this morning and pt claimed more confident now. Shower done. No PIV access.   Action: Patient medicated during the shift for pain with Tylenol and Ibuprofen with relief after 1 hour. Patient education done see education record.  Response: Positive attachment behaviors observed with infant. Support persons  present.    Plan: Continue with the plan of care

## 2019-04-18 NOTE — PLAN OF CARE
Patient arrived to [facility] unit via wheelchair at 0010,with belongings, accompanied by spouse/ significant other, with infant in arms. Received report from RADHA Moyer and checked bands. Unit and room orientation completd. Call light given; no concerns present at this time. Continue with plan of care.

## 2019-04-18 NOTE — DISCHARGE SUMMARY
[facility] Discharge Summary    Sophie Felton MRN# [id number]   Age: 29 year old YOB: 1990     Date of Admission:  2019  Date of Discharge::  19  Admitting Physician:  Lulú Matias MD  Discharge Physician:  Romana Sue MD          Admission Diagnoses:   -IUP at 39w2d  -elective induction of labor  -depression          Discharge Diagnosis:   -, now delivered   -postpartum hemorrhage  - cervical laceration         Procedures:   Procedure(s): -            Medications Prior to Admission:     Medications Prior to Admission   Medication Sig Dispense Refill Last Dose     busPIRone (BUSPAR) 5 MG tablet    2019 at      Docusate Sodium (COLACE PO)    Past Month at      loratadine (CLARITIN) 10 MG tablet Take 20 mg by mouth daily   Past Month at      ondansetron (ZOFRAN) 4 MG tablet Take 1 tablet (4 mg) by mouth every 8 hours as needed for nausea 20 tablet 1 Past Month at       Prenatal Vit-Fe Fumarate-FA (PRENATAL MULTIVITAMIN  PLUS IRON) 27-0.8 MG TABS per tablet Take 1 tablet by mouth daily   Past Month at      ammonium lactate (LAC-HYDRIN) 12 % lotion Apply topically 2 times daily as needed for dry skin (Patient not taking: Reported on 2019) 360 g 3 More than a month at 1000     triamcinolone (KENALOG) 0.1 % external cream Apply topically 2 times daily (Patient not taking: Reported on 2019) 80 g 1 More than a month at 2000             Discharge Medications:        Review of your medicines      UNREVIEWED medicines. Ask your doctor about these medicines      Dose / Directions   ammonium lactate 12 % external lotion  Commonly known as:  LAC-HYDRIN  Used for:  Keratosis pilaris      Apply topically 2 times daily as needed for dry skin  Quantity:  360 g  Refills:  3     busPIRone 5 MG tablet  Commonly known as:  BUSPAR      Refills:  0     COLACE PO      Refills:  0     escitalopram 20 MG tablet  Commonly known as:  LEXAPRO  Used for:   Mild major depression (H)      TAKE 1 TABLET BY MOUTH EVERY DAY  Quantity:  90 tablet  Refills:  0     loratadine 10 MG tablet  Commonly known as:  CLARITIN      Dose:  20 mg  Take 20 mg by mouth daily  Refills:  0     ondansetron 4 MG tablet  Commonly known as:  ZOFRAN  Used for:  Nausea/vomiting in pregnancy      Dose:  4 mg  Take 1 tablet (4 mg) by mouth every 8 hours as needed for nausea  Quantity:  20 tablet  Refills:  1     prenatal multivitamin w/iron 27-0.8 MG tablet      Dose:  1 tablet  Take 1 tablet by mouth daily  Refills:  0     triamcinolone 0.1 % external cream  Commonly known as:  KENALOG  Used for:  Other eczema      Apply topically 2 times daily  Quantity:  80 g  Refills:  1           Where to get your medicines      These medications were sent to [place] [id number] [place] [id number] [facility]  [address] [facility] [address]    Phone:  [phone]     escitalopram 20 MG tablet                 Consultations:   none          Brief Admission History   Sophie Felton is a 29 year old  at 39w2d who presented to L&D for elective induction of labor. Pregnancy was complicated by: depression.         Brief Intrapartum Course:   Patient's SVE on admission was 3/80/-2, PV misoprostol x1 was used for cervical ripening, and AROM occurred at 5 cm. Labor progressed with pitocin augmentation. The patient progressed to complete and pushed for approximately 2 minutes. FHT was Cat 1 for the labor course. She subsequently had an uncomplicated  of a female infant at 2121 on 2019 . The infant head was delivered in direct OA position. No Nuchal. Apgars were 8 and 8 and 1 and 5 minutes. Weight 3250g. Routine cord blood was obtained. IV pitocin was started for active management of the third stage. The placenta was delivered with gentle downward traction. It was found to be intact with a three vessel cord. Uterine tone was suboptimal. IV pitocin rate was doubled.  She received more  uterine massage and bleeding continued.  A uterine sweep was performed with large return of blood clot but no retained products of conception.  Her bleeding slowed but continued.  A thorough vaginal exam was performed and an approximate 2 cm long posterior laceration of the cervix was noted.  The cervix was grasped with ringed forceps and repaired with a running stitch of 0-monocryl.  Her perineum was otherwise intact.  Upon final inspection, there was good hemostasis and there were no retained lap sponges. Instrument and sharp count was correct with exception of one lap missing. See note by Dr. Matias regarding management of missing lap. EBL: 1000 mL     Dr. Matias was present for delivery                  Hospital Course:   The patient's hospital course was unremarkable.  On discharge, her pain was well controlled. Vaginal bleeding is less than average menstrual flow.  Voiding without difficulty.  Ambulating well and tolerating a normal diet.  No fever.  Breastfeeding going well.  Infant is stable.  No bowel movement yet, anxious given cerivical laceration.  She was discharged on post-partum day #2.    Post-partum hemoglobin: 9.2          Discharge Instructions and Follow-Up:   Discharge diet: Regular   Discharge activity: Pelvic rest for 6 weeks including no sexual intercourse, tampons, or douching.    Discharge follow-up: Follow up with your primary OB for a routine postpartum visit in 6 weeks           Discharge Disposition:   Discharged to home in stable condition      - Take PO iron daily  - Increase bowel regimen       Becka Fisher MD  [facility] Family Medicine PGY-1  Pager [phone]     Physician Attestation   I, Christa Winchester, saw and evaluated this patient prior to discharge.  I discussed the patient with the resident/fellow and agree with plan of care as documented in the note.      I personally reviewed vital signs and medications.    Christa Winchester MD  Date of Service  (when I saw the patient): 04/19/19

## 2019-04-18 NOTE — PLAN OF CARE
of viable female at 212. EBL 1000mL (unable to determine QBL). Cervical laceration with repair, fentanyl for pain control during repair. First degree perineal laceration, no repair needed.     Maternal VSS. Fundus and lochia WDL.      Incorrect lap count following delivery. Room searched. MD Matias notified. X-Ray ordered. Ancef ordered due to uterine sweep following delivery. Report to Comfort Reagan RN.

## 2019-04-18 NOTE — PLAN OF CARE
VSS. Postpartum checks WDL. Up independently, voiding without difficulty. Cramping pain managed with ibuprofen, tylenol, and one dose of oxycodone. Breastfeeding well with minimal assist for positioning and deeper latch. Able to hand-express colostrum into infant's mouth.  supportive at the bedside.

## 2019-04-19 VITALS
TEMPERATURE: 97.9 F | HEART RATE: 66 BPM | DIASTOLIC BLOOD PRESSURE: 84 MMHG | OXYGEN SATURATION: 99 % | RESPIRATION RATE: 16 BRPM | SYSTOLIC BLOOD PRESSURE: 125 MMHG

## 2019-04-19 PROCEDURE — 25000132 ZZH RX MED GY IP 250 OP 250 PS 637: Performed by: STUDENT IN AN ORGANIZED HEALTH CARE EDUCATION/TRAINING PROGRAM

## 2019-04-19 RX ORDER — FERROUS SULFATE 325(65) MG
325 TABLET, DELAYED RELEASE (ENTERIC COATED) ORAL DAILY
Qty: 30 TABLET | Refills: 2 | Status: SHIPPED | OUTPATIENT
Start: 2019-04-19 | End: 2024-03-20

## 2019-04-19 RX ORDER — OXYCODONE HYDROCHLORIDE 5 MG/1
5 TABLET ORAL EVERY 6 HOURS PRN
Qty: 5 TABLET | Refills: 0 | Status: SHIPPED | OUTPATIENT
Start: 2019-04-19 | End: 2019-05-30

## 2019-04-19 RX ORDER — FERROUS SULFATE 325(65) MG
325 TABLET, DELAYED RELEASE (ENTERIC COATED) ORAL DAILY
Qty: 30 TABLET | Refills: 2 | Status: SHIPPED | OUTPATIENT
Start: 2019-04-19 | End: 2019-04-19

## 2019-04-19 RX ORDER — AMOXICILLIN 250 MG
1 CAPSULE ORAL 2 TIMES DAILY
Qty: 30 TABLET | Refills: 0 | Status: SHIPPED | OUTPATIENT
Start: 2019-04-19 | End: 2024-03-20

## 2019-04-19 RX ORDER — ACETAMINOPHEN 325 MG/1
975 TABLET ORAL EVERY 6 HOURS PRN
Qty: 20 TABLET | Refills: 0 | Status: SHIPPED | OUTPATIENT
Start: 2019-04-19 | End: 2019-04-19

## 2019-04-19 RX ORDER — IBUPROFEN 800 MG/1
800 TABLET, FILM COATED ORAL EVERY 6 HOURS PRN
Qty: 20 TABLET | Refills: 0 | Status: SHIPPED | OUTPATIENT
Start: 2019-04-19 | End: 2019-04-19

## 2019-04-19 RX ORDER — AMOXICILLIN 250 MG
1 CAPSULE ORAL 2 TIMES DAILY
Qty: 30 TABLET | Refills: 0 | Status: SHIPPED | OUTPATIENT
Start: 2019-04-19 | End: 2019-04-19

## 2019-04-19 RX ORDER — POLYETHYLENE GLYCOL 3350 17 G/17G
1 POWDER, FOR SOLUTION ORAL DAILY
Qty: 30 PACKET | Refills: 0 | Status: SHIPPED | OUTPATIENT
Start: 2019-04-19 | End: 2024-03-20

## 2019-04-19 RX ORDER — IBUPROFEN 800 MG/1
800 TABLET, FILM COATED ORAL EVERY 6 HOURS PRN
Qty: 20 TABLET | Refills: 0 | Status: SHIPPED | OUTPATIENT
Start: 2019-04-19 | End: 2019-05-30

## 2019-04-19 RX ORDER — ACETAMINOPHEN 325 MG/1
975 TABLET ORAL EVERY 6 HOURS PRN
Qty: 20 TABLET | Refills: 0 | Status: SHIPPED | OUTPATIENT
Start: 2019-04-19 | End: 2024-03-20

## 2019-04-19 RX ADMIN — ACETAMINOPHEN 975 MG: 325 TABLET, FILM COATED ORAL at 11:11

## 2019-04-19 RX ADMIN — IBUPROFEN 800 MG: 800 TABLET, FILM COATED ORAL at 05:55

## 2019-04-19 RX ADMIN — BUSPIRONE HYDROCHLORIDE 5 MG: 5 TABLET ORAL at 00:16

## 2019-04-19 RX ADMIN — ACETAMINOPHEN 975 MG: 325 TABLET, FILM COATED ORAL at 04:51

## 2019-04-19 RX ADMIN — SENNOSIDES AND DOCUSATE SODIUM 2 TABLET: 8.6; 5 TABLET ORAL at 09:24

## 2019-04-19 NOTE — PLAN OF CARE
Data: Vital signs stable and postpartum assessments within normal limits. Pt is up voiding and ambulating in the room with no problem. Pt is breastfeeding well with minimal assist latching. Pt complains of cramping especially with breastfeeding. Nipples are slight tender and pt is using her colostrum for healing.  Action: pt medicated with Tylenol for pain control. Checked latch and assisted pt with a deeper latch/ positioning. Review of care plan, teaching, and discharge instructions done with pt.  Infant identification with ID bands done, pt verification with signature obtained. Pt was given discharge medications, breast pump and copies of the discharge papers. Encouraged pt to soak in the tub twice a day and to hand expressed colostrum to apply on nipples after breastfeeding.  Response: pt states understanding and comfort with infant cares and feeding. All questions about baby care addressed. Pt discharged home with baby in a car seat.

## 2019-04-19 NOTE — LACTATION NOTE
This note was copied from a baby's chart.  Follow up visit on day of discharge.  Parents report feedings are going well, mom notes slight discomfort sometimes but able to pull down on chin to bring lip out and fix it. Sometimes she says nipple is not fully rounded after but this varies.  Baby is cueing regularly, breastfeeding with eager latch, good output, 4.7% loss at 24 hours and low risk serum bilirubin.     Reviewed signs to watch for if not fully emptying breast, nipple pinched and pain, damage, decreased diaper output or difficulty with maintaining weight, etc. to ask for frenulum evaluation again, but currently good function and appears to be transferring milk well.     Reviewed breastfeeding chapter in New Beginnings book, breastfeeding log, ways to tell if getting enough and when/who to call if concerns, and breastfeeding resource list adding in [email or web address] and additional community resources. Gave support and encouragement, answered questions.

## 2019-04-19 NOTE — PLAN OF CARE
Data: Vital signs within normal limits. Postpartum checks within normal limits - see flow record. Patient eating and drinking normally. Patient able to empty bladder independently and is up ambulating. Patient performing self cares and is able to care for infant. Breastfeeding on demand and hand expressing for baby.   Action: Pain has been adequately managed with oral medications. Patient requested Oxycodone for worsening cramping, provider ordered a one-time half tab (2.5mg) dose of Oxycodone. Discussed pain management with patient and that Oxycodone is a one time dose. Patient also using hot packs for cramping, reporting that they are helping.   Response: Positive attachment behaviors observed with infant. Support person, spouse: Porfirio present.   Plan: Continue with the plan of care.

## 2019-04-19 NOTE — PROGRESS NOTES
[facility], [place]    Post-Partum Progress Note    Assessment & Plan   Assessment:  Post-partum day #2  Normal spontaneous vaginal delivery    Doing well.  No excessive bleeding  Pain well-controlled.    Plan:  Discharge later today  RTC 6 weeks for pp check  Taking few oxy, will send with script for #5 tablets.   Fe for anemia when having BM    MEGAN HARDIN     Interval History   Doing well.  Pain is well-controlled.  No fevers.  No history of foul-smelling vaginal discharge.  Good appetite.  Denies chest pain, shortness of breath, nausea or vomiting.  Vaginal bleeding is similar to a heavy menstrual flow.  Ambulatory.  Breastfeeding well. Still with lot of cramping noted with breastfeeding.    Medications     - MEDICATION INSTRUCTIONS -       NO Rho (D) immune globulin (RhoGam) needed - mother Rh POSITIVE       - MEDICATION INSTRUCTIONS -       oxytocin in 0.9% NaCl       oxytocin in 0.9% NaCl 100 mL/hr (04/17/19 2250)       busPIRone  5 mg Oral At Bedtime     escitalopram  20 mg Oral At Bedtime     senna-docusate  1 tablet Oral BID    Or     senna-docusate  2 tablet Oral BID       Physical Exam   Temp: 98  F (36.7  C) Temp src: Oral BP: 114/70 Pulse: 75 Heart Rate: 69 Resp: 16        There were no vitals filed for this visit.  Vital Signs with Ranges  Temp:  [97.8  F (36.6  C)-98  F (36.7  C)] 98  F (36.7  C)  Pulse:  [75] 75  Heart Rate:  [69-71] 69  Resp:  [16] 16  BP: (114-125)/(65-83) 114/70  No intake/output data recorded.    Uterine fundus is firm, non-tender and at the level of the umbilicus    Data   Recent Labs   Lab Test 04/17/19  1018   ABO A   RH Pos   AS Pos*     Recent Labs   Lab Test 04/18/19  0828 04/17/19  1018   HGB 9.2* 10.9*     Recent Labs   Lab Test 10/11/18  1417   RUQIGG 92

## 2019-04-19 NOTE — PROVIDER NOTIFICATION
"   04/19/19 1056   Provider Notification   Provider Name/Title Dr. Reid (G2)   Method of Notification Electronic Page   Request Evaluate-Remote   Notification Reason Medication Request   \"Can you please order patient's Tylenol, iron, motrin and senna to the [facility]? She doesn't want to wait and would like to pick them up there. Thanks!\" Text page sent at 3034    Addendum:   Spoke with Dr. Reid. He will order requested discharge meds to [facility] listed in patient chart.   "

## 2019-04-19 NOTE — PROVIDER NOTIFICATION
04/18/19 7969   Provider Notification   Provider Name/Title Dr. Reid (G2)   Method of Notification Electronic Page   Request Evaluate-Remote   Notification Reason Medication Request;Status Update   Patient having worsening uterine cramping/pain. Has already taken Tylenol and Ibuprofen and has hot packs on. Could pt possibly get a one time dose of Oxycodone? Thank you.

## 2019-04-19 NOTE — PROVIDER NOTIFICATION
04/18/19 1933   Provider Notification   Provider Name/Title Dr. Pratt (G3)   Method of Notification Electronic Page   Request Evaluate-Remote   Notification Reason Medication Request;Status Update   Patient having worsening uterine cramping/pain. Has already taken Tylenol and Ibuprofen and has hot packs on. Could pt possibly get a one time dose of Oxycodone? Thank you.

## 2019-04-19 NOTE — PLAN OF CARE
VSS. Postpartum checks WDL. Up independently, voiding without difficulty. Cramping pain managed with tylenol and ibuprofen. Breastfeeding infant well with good latch observed. Able to easily hand-express colostrum.  supportive at the bedside. Anticipating discharge later today.

## 2019-04-21 LAB
BLD PROD TYP BPU: NORMAL
BLD PROD TYP BPU: NORMAL
BLD UNIT ID BPU: 0
BLD UNIT ID BPU: 0
BLOOD PRODUCT CODE: NORMAL
BLOOD PRODUCT CODE: NORMAL
BPU ID: NORMAL
BPU ID: NORMAL
TRANSFUSION STATUS PATIENT QL: NORMAL

## 2019-05-30 ENCOUNTER — PRENATAL OFFICE VISIT (OUTPATIENT)
Dept: OBGYN | Facility: CLINIC | Age: 29
End: 2019-05-30
Payer: COMMERCIAL

## 2019-05-30 VITALS
TEMPERATURE: 98.7 F | BODY MASS INDEX: 25.71 KG/M2 | HEIGHT: 69 IN | DIASTOLIC BLOOD PRESSURE: 72 MMHG | WEIGHT: 173.6 LBS | OXYGEN SATURATION: 96 % | SYSTOLIC BLOOD PRESSURE: 106 MMHG | HEART RATE: 76 BPM

## 2019-05-30 DIAGNOSIS — Z12.4 SCREENING FOR CERVICAL CANCER: Primary | ICD-10-CM

## 2019-05-30 PROCEDURE — G0145 SCR C/V CYTO,THINLAYER,RESCR: HCPCS | Performed by: OBSTETRICS & GYNECOLOGY

## 2019-05-30 PROCEDURE — 87624 HPV HI-RISK TYP POOLED RSLT: CPT | Performed by: OBSTETRICS & GYNECOLOGY

## 2019-05-30 PROCEDURE — 99207 ZZC POST PARTUM EXAM: CPT | Performed by: OBSTETRICS & GYNECOLOGY

## 2019-05-30 ASSESSMENT — PATIENT HEALTH QUESTIONNAIRE - PHQ9
5. POOR APPETITE OR OVEREATING: SEVERAL DAYS
SUM OF ALL RESPONSES TO PHQ QUESTIONS 1-9: 1

## 2019-05-30 ASSESSMENT — ANXIETY QUESTIONNAIRES
6. BECOMING EASILY ANNOYED OR IRRITABLE: SEVERAL DAYS
5. BEING SO RESTLESS THAT IT IS HARD TO SIT STILL: NOT AT ALL
2. NOT BEING ABLE TO STOP OR CONTROL WORRYING: NOT AT ALL
1. FEELING NERVOUS, ANXIOUS, OR ON EDGE: SEVERAL DAYS
7. FEELING AFRAID AS IF SOMETHING AWFUL MIGHT HAPPEN: SEVERAL DAYS
GAD7 TOTAL SCORE: 4
3. WORRYING TOO MUCH ABOUT DIFFERENT THINGS: NOT AT ALL

## 2019-05-30 ASSESSMENT — MIFFLIN-ST. JEOR: SCORE: 1571.82

## 2019-05-30 NOTE — PROGRESS NOTES
"Chief Complaint   Patient presents with     Post Partum Exam       Initial Ht 1.753 m (5' 9\")   BMI 28.32 kg/m   Estimated body mass index is 28.32 kg/m  as calculated from the following:    Height as of this encounter: 1.753 m (5' 9\").    Weight as of 19: 87 kg (191 lb 12.8 oz).  BP completed using cuff size: regular    Questioned patient about current smoking habits.  Pt. has never smoked.          The following HM Due: pap smear, soon      The following patient reported/Care Every where data was sent to:  P ABSTRACT QUALITY INITIATIVES [01023]  n/a      n/a, patient has appointment for today and orders have been placed        SUBJECTIVE:  Sophie Felton is a 29 year old female  here for a postpartum visit.  She had a  on 19 delivering a healthy baby girl weighing 7 lbs 3 oz at term.      delivery complications:  Yes, cervical laceration, no epidural and went really fast on her hands and knees.  breast feeding:  Yes   bladder problems:  No  bowel problems/hemorrhoids:  No  episiotomy/laceration/incision healed? Yes:   vaginal flow:  Still spotting, sometimes passes yellow type discharge  [place]:  No  contraception:  vasectomy  emotional adjustment:  doing well and happy  back to work:  no    OBJECTIVE:  Blood pressure 106/72, pulse 76, temperature 98.7  F (37.1  C), temperature source Oral, height 1.753 m (5' 9\"), weight 78.7 kg (173 lb 9.6 oz), SpO2 96 %, currently breastfeeding.   General - pleasant female in no acute distress.  Breast - no nodularity, asymmetry or nipple discharge bilaterally.  Abdomen - soft, nontender, nondistended, no hepatosplenomegaly.  Pelvic - EG: normal adult female, BUS: within normal limits, Vagina: well rugated, no discharge, Cervix: no lesions or CMT, Uterus: firm, normal sized and nontender, Adnexae: no masses or tenderness.  Rectovaginal - deferred.    ASSESSMENT:  normal postpartum exam  Still spotting --  discussed may have been small " fragments of membranes     PLAN:  Discussed contraception and  is planning on vasectomy.  They will use condoms until then.  May resume normal activities without restrictions  Pap smear was  done today  Patient to call me if spotting continues beyond one more week.  Then consider a pelvic ultrasound.        The patient will use condoms for birth control until  can get a vasectomy.  Full counseling was provided, and all questions answered.   Return to clinic in one year for an annual, when due for a pap smear or PRN.    Lulú Matias MD

## 2019-05-31 ASSESSMENT — ANXIETY QUESTIONNAIRES: GAD7 TOTAL SCORE: 4

## 2019-06-03 LAB
COPATH REPORT: NORMAL
PAP: NORMAL

## 2019-06-04 LAB
FINAL DIAGNOSIS: NORMAL
HPV HR 12 DNA CVX QL NAA+PROBE: NEGATIVE
HPV16 DNA SPEC QL NAA+PROBE: NEGATIVE
HPV18 DNA SPEC QL NAA+PROBE: NEGATIVE
SPECIMEN DESCRIPTION: NORMAL
SPECIMEN SOURCE CVX/VAG CYTO: NORMAL

## 2019-06-05 PROBLEM — Z12.4 CERVICAL CANCER SCREENING: Status: ACTIVE | Noted: 2019-06-05

## 2019-09-26 ENCOUNTER — E-VISIT (OUTPATIENT)
Dept: FAMILY MEDICINE | Facility: CLINIC | Age: 29
End: 2019-09-26
Payer: COMMERCIAL

## 2019-09-26 DIAGNOSIS — J32.9 SINUSITIS, UNSPECIFIED CHRONICITY, UNSPECIFIED LOCATION: Primary | ICD-10-CM

## 2019-09-26 PROCEDURE — 99444 ZZC PHYSICIAN ONLINE EVALUATION & MANAGEMENT SERVICE: CPT | Performed by: FAMILY MEDICINE

## 2019-09-27 RX ORDER — AMOXICILLIN 875 MG
875 TABLET ORAL 2 TIMES DAILY
Qty: 20 TABLET | Refills: 0 | Status: SHIPPED | OUTPATIENT
Start: 2019-09-27 | End: 2019-12-31

## 2019-10-02 NOTE — Clinical Note
Adryan Weber, I do ultrasound at every new ob visit. You can tell patients that. They get bummed sometimes not to have brought their spouse.  I think Florencio does too. The midwives and FPs don't.  Christa
[place]

## 2019-12-31 ENCOUNTER — OFFICE VISIT (OUTPATIENT)
Dept: FAMILY MEDICINE | Facility: CLINIC | Age: 29
End: 2019-12-31
Payer: COMMERCIAL

## 2019-12-31 VITALS
TEMPERATURE: 97 F | OXYGEN SATURATION: 98 % | WEIGHT: 190 LBS | BODY MASS INDEX: 28.06 KG/M2 | DIASTOLIC BLOOD PRESSURE: 68 MMHG | HEART RATE: 72 BPM | RESPIRATION RATE: 16 BRPM | SYSTOLIC BLOOD PRESSURE: 124 MMHG

## 2019-12-31 DIAGNOSIS — R10.13 DYSPEPSIA: ICD-10-CM

## 2019-12-31 DIAGNOSIS — Z23 NEED FOR PROPHYLACTIC VACCINATION AND INOCULATION AGAINST INFLUENZA: ICD-10-CM

## 2019-12-31 DIAGNOSIS — F32.0 MILD MAJOR DEPRESSION (H): ICD-10-CM

## 2019-12-31 DIAGNOSIS — Z00.00 ROUTINE GENERAL MEDICAL EXAMINATION AT A HEALTH CARE FACILITY: Primary | ICD-10-CM

## 2019-12-31 DIAGNOSIS — L43.9 LICHEN PLANUS: ICD-10-CM

## 2019-12-31 PROCEDURE — 90471 IMMUNIZATION ADMIN: CPT | Performed by: PHYSICIAN ASSISTANT

## 2019-12-31 PROCEDURE — 90686 IIV4 VACC NO PRSV 0.5 ML IM: CPT | Performed by: PHYSICIAN ASSISTANT

## 2019-12-31 PROCEDURE — 99395 PREV VISIT EST AGE 18-39: CPT | Mod: 25 | Performed by: PHYSICIAN ASSISTANT

## 2019-12-31 RX ORDER — FLUOCINONIDE 0.5 MG/G
OINTMENT TOPICAL 2 TIMES DAILY
Qty: 30 G | Refills: 1 | Status: SHIPPED | OUTPATIENT
Start: 2019-12-31 | End: 2024-03-20

## 2019-12-31 ASSESSMENT — ENCOUNTER SYMPTOMS
PALPITATIONS: 0
CONSTIPATION: 1
DIARRHEA: 1
ARTHRALGIAS: 1
WEAKNESS: 0
SORE THROAT: 0
JOINT SWELLING: 0
EYE PAIN: 0
COUGH: 0
BREAST MASS: 0
MYALGIAS: 1
HEARTBURN: 0
HEMATURIA: 0
PARESTHESIAS: 0
CHILLS: 0
HEMATOCHEZIA: 0
NERVOUS/ANXIOUS: 1
SHORTNESS OF BREATH: 0
DIZZINESS: 0
DYSURIA: 0
FREQUENCY: 0
ABDOMINAL PAIN: 1
FEVER: 0
HEADACHES: 1
NAUSEA: 0

## 2019-12-31 ASSESSMENT — PATIENT HEALTH QUESTIONNAIRE - PHQ9: SUM OF ALL RESPONSES TO PHQ QUESTIONS 1-9: 4

## 2019-12-31 NOTE — PATIENT INSTRUCTIONS
Patient Education     Understanding Lichen Planus  Lichen planus is a long-term (chronic) skin disease. It s a rash that can develop anywhere on the body. But it most often erupts on the wrists, arms, legs, scalp, and genitals. It may also appear on the nails and in the mouth. Peoples ages 30 to 60 are more likely to get it.  How to say it  LY-shad play-nuhs   What causes lichen planus?  The cause of lichen planus is often not known. But metals such as gold, and certain medicines may trigger it. These include non-steroidal anti-inflammatory medicines and penicillin. Some research suggests the disease may also be linked to hepatitis C.  Symptoms of lichen planus  Lichen planus causes flat-topped bumps or patches to form on the skin. These bumps may hurt and be very itchy. They are usually shiny and violet in color. But they may be dark red or purple. They may also have fine white lines on them. In the mouth, the condition may look like patches of white lace.  Treatment for lichen planus  Lichen planus often goes away within a few years. It may do so without treatment. But to speed up healing, treatment options include:    Steroids. These medicines can be put directly onto the skin or injected into the affected area. They can also be taken by mouth.    Other medicines. Your healthcare provider may give you other medicines, such as an antihistamine or retinoid, to ease itching and pain. Anti-itch creams or ointments may also work. Your provider might also prescribe other medicines that suppress the immune system.    Phototherapy. This treatment directs ultraviolet light on the skin to help clear it.  Self-care tips for lichen planus    Don t scratch any affected areas.    Use mild soap and moisturizing lotion after bathing.  When to call your healthcare provider  Call your healthcare provider right away if you have any of these:    Fever of 100.4 F (38 C) or higher, or as directed    New symptoms    Pain that gets  worse    Symptoms that don t get better, or get worse    Ulcers in the mouth   Date Last Reviewed: 5/1/2016 2000-2018 [employer]. [address] [place] [address], [place] [address]. All rights reserved. This information is not intended as a substitute for professional medical care. Always follow your healthcare professional's instructions.

## 2019-12-31 NOTE — PROGRESS NOTES
SUBJECTIVE:   CC: Sophie Felton is an 29 year old woman who presents for preventive health visit.     Healthy Habits:     Getting at least 3 servings of Calcium per day:  Yes    Bi-annual eye exam:  Yes    Dental care twice a year:  NO    Sleep apnea or symptoms of sleep apnea:  None    Diet:  Regular (no restrictions) and Breakfast skipped    Frequency of exercise:  None    Taking medications regularly:  Yes    Medication side effects:  None    PHQ-2 Total Score: 2    Additional concerns today:  Yes          Today's PHQ-2 Score:   PHQ-2 ( 1999 Pfizer) 12/31/2019   Q1: Little interest or pleasure in doing things 1   Q2: Feeling down, depressed or hopeless 1   PHQ-2 Score 2   Q1: Little interest or pleasure in doing things Several days   Q2: Feeling down, depressed or hopeless Several days   PHQ-2 Score 2       Abuse: Current or Past(Physical, Sexual or Emotional)- No  Do you feel safe in your environment? Yes        Social History     Tobacco Use     Smoking status: Never Smoker     Smokeless tobacco: Never Used   Substance Use Topics     Alcohol use: No       Alcohol Use 12/31/2019   Prescreen: >3 drinks/day or >7 drinks/week? No     Reviewed orders with patient.  Reviewed health maintenance and updated orders accordingly - Yes      Pertinent mammograms are reviewed under the imaging tab.  History of abnormal Pap smear: NO - age 30- 65 PAP every 3 years recommended  PAP / HPV Latest Ref Rng & Units 5/30/2019 10/27/2016   PAP - NIL NIL   HPV 16 DNA NEG:Negative Negative -   HPV 18 DNA NEG:Negative Negative -   OTHER HR HPV NEG:Negative Negative -     Reviewed and updated as needed this visit by clinical staff  Tobacco  Allergies  Meds  Med Hx  Surg Hx  Fam Hx  Soc Hx        Reviewed and updated as needed this visit by Provider          Review of Systems   Constitutional: Negative for chills and fever.   HENT: Negative for congestion, ear pain, hearing loss and sore throat.    Eyes: Negative for pain and  visual disturbance.   Respiratory: Negative for cough and shortness of breath.    Cardiovascular: Negative for chest pain, palpitations and peripheral edema.   Gastrointestinal: Positive for abdominal pain, constipation and diarrhea. Negative for heartburn, hematochezia and nausea.   Breasts:  Negative for tenderness, breast mass and discharge.   Genitourinary: Negative for dysuria, frequency, genital sores, hematuria, pelvic pain, urgency, vaginal bleeding and vaginal discharge.   Musculoskeletal: Positive for arthralgias and myalgias. Negative for joint swelling.   Skin: Positive for rash.   Neurological: Positive for headaches. Negative for dizziness, weakness and paresthesias.   Psychiatric/Behavioral: Negative for mood changes. The patient is nervous/anxious.         OBJECTIVE:   Pulse 72   Temp 97  F (36.1  C) (Oral)   Resp 16   Wt 86.2 kg (190 lb)   SpO2 98%   BMI 28.06 kg/m       Physical Exam  GENERAL: healthy, alert and no distress  EYES: Eyes grossly normal to inspection, PERRL and conjunctivae and sclerae normal  HENT: ear canals and TM's normal, nose and mouth without ulcers or lesions  NECK: no adenopathy, no asymmetry, masses, or scars and thyroid normal to palpation  RESP: lungs clear to auscultation - no rales, rhonchi or wheezes  BREAST: normal without masses, tenderness or nipple discharge and no palpable axillary masses or adenopathy  CV: regular rate and rhythm, normal S1 S2, no S3 or S4, no murmur, click or rub, no peripheral edema and peripheral pulses strong  ABDOMEN: soft, nontender, no hepatosplenomegaly, no masses and bowel sounds normal  MS: no gross musculoskeletal defects noted, no edema  SKIN: papules on wrist, ankles and anterior shins.  Seborrhea of nasal folds noted.   NEURO: Normal strength and tone, mentation intact and speech normal  PSYCH: mentation appears normal, affect normal/bright    Diagnostic Test Results:  none     ASSESSMENT/PLAN:   1. Routine general medical  "examination at a health care facility    2. Dyspepsia  -Dietary measures reviewed.     3. Mild major depression (H)    4. Need for prophylactic vaccination and inoculation against influenza  - INFLUENZA VACCINE IM > 6 MONTHS VALENT IIV4 [37169]  - Vaccine Administration, Initial [79664]    5. Lichen planus  - fluocinonide (LIDEX) 0.05 % external ointment; Apply topically 2 times daily  Dispense: 30 g; Refill: 1,      COUNSELING:  Reviewed preventive health counseling, as reflected in patient instructions    Estimated body mass index is 28.06 kg/m  as calculated from the following:    Height as of 5/30/19: 1.753 m (5' 9\").    Weight as of this encounter: 86.2 kg (190 lb).         reports that she has never smoked. She has never used smokeless tobacco.    Continue supportive OTC measures.      Counseling Resources:  ATP IV Guidelines  Pooled Cohorts Equation Calculator  Breast Cancer Risk Calculator  FRAX Risk Assessment  ICSI Preventive Guidelines  Dietary Guidelines for Americans, 2010  USDA's MyPlate  ASA Prophylaxis  Lung CA Screening    Physician Attestation   I agree with the information in this note.    Bairon Carreon PA-C  [facility]  "

## 2020-01-28 ENCOUNTER — OFFICE VISIT (OUTPATIENT)
Dept: FAMILY MEDICINE | Facility: CLINIC | Age: 30
End: 2020-01-28
Payer: COMMERCIAL

## 2020-01-28 VITALS
RESPIRATION RATE: 16 BRPM | WEIGHT: 191 LBS | SYSTOLIC BLOOD PRESSURE: 102 MMHG | OXYGEN SATURATION: 97 % | BODY MASS INDEX: 28.29 KG/M2 | DIASTOLIC BLOOD PRESSURE: 62 MMHG | TEMPERATURE: 98.6 F | HEIGHT: 69 IN | HEART RATE: 88 BPM

## 2020-01-28 DIAGNOSIS — R10.13 DYSPEPSIA: ICD-10-CM

## 2020-01-28 DIAGNOSIS — L43.9 LICHEN PLANUS: ICD-10-CM

## 2020-01-28 DIAGNOSIS — L71.0 PERIORAL DERMATITIS: Primary | ICD-10-CM

## 2020-01-28 PROCEDURE — 99214 OFFICE O/P EST MOD 30 MIN: CPT | Performed by: PHYSICIAN ASSISTANT

## 2020-01-28 RX ORDER — METRONIDAZOLE 7.5 MG/G
LOTION TOPICAL
Qty: 60 ML | Refills: 3 | Status: SHIPPED | OUTPATIENT
Start: 2020-01-28 | End: 2024-03-20

## 2020-01-28 RX ORDER — FEXOFENADINE HCL 180 MG/1
1 TABLET ORAL DAILY
COMMUNITY
Start: 2019-08-19 | End: 2024-03-20

## 2020-01-28 ASSESSMENT — MIFFLIN-ST. JEOR: SCORE: 1651

## 2020-01-28 NOTE — PROGRESS NOTES
"Subjective     Sophie Felton is a 29 year old female who presents to clinic today for the following health issues:    HPI   Patient presents with:  RECHECK: 1 month follow up rash. rash is still the same    Patient has noticed some improvement in wrist lesions and sl improvement in leg.  Still itches.  Noticing rash around nose, eyes and mouth. Also says that stopping dairy has helped but still notices occ GI sx. Patient denies using steroids on her face.       Review of Systems   ROS COMP: Constitutional, HEENT, cardiovascular, pulmonary, gi and gu systems are negative, except as otherwise noted.      Objective    /62   Pulse 88   Temp 98.6  F (37  C) (Oral)   Resp 16   Ht 1.753 m (5' 9.02\")   Wt 86.6 kg (191 lb)   SpO2 97%   BMI 28.19 kg/m    Body mass index is 28.19 kg/m .  Physical Exam   GENERAL: healthy, alert and no distress  SKIN: erythematous papules of periocular and nasal folds c/w dermatitis.     Total visit time is 20 Minutes with > 16 Minutes spent in care and consultation regarding Dermatitis, Lichen Planus and allergy sx with medication management, referral and follow up plan.      Diagnostic Test Results:  none         Assessment & Plan     1. Perioral dermatitis  - metroNIDAZOLE (METROLOTION) 0.75 % external lotion; Apply once or twice daily  Dispense: 60 mL; Refill: 3    2. Lichen planus  Continue Lidex      3. Dyspepsia  - ALLERGY/ASTHMA ADULT REFERRAL     Use medication as directed.  Patient amenable to this follow up plan.       Return if symptoms worsen or fail to improve.    Chica Carreon PA-C  [facility]      "

## 2020-02-28 LAB — PHQ9 SCORE: 7

## 2020-11-03 ENCOUNTER — TRANSFERRED RECORDS (OUTPATIENT)
Dept: HEALTH INFORMATION MANAGEMENT | Facility: CLINIC | Age: 30
End: 2020-11-03
Payer: COMMERCIAL

## 2020-12-27 ENCOUNTER — HEALTH MAINTENANCE LETTER (OUTPATIENT)
Age: 30
End: 2020-12-27

## 2021-02-10 ENCOUNTER — TRANSFERRED RECORDS (OUTPATIENT)
Dept: HEALTH INFORMATION MANAGEMENT | Facility: CLINIC | Age: 31
End: 2021-02-10

## 2021-03-01 NOTE — PLAN OF CARE
Problem: Labor (Cervical Ripen, Induct, Augment) (Adult,Obstetrics,Pediatric)  Goal: Signs and Symptoms of Listed Potential Problems Will be Absent or Manageable (Labor)  Signs and symptoms of listed potential problems will be absent or manageable by discharge/transition of care (reference Labor (Cervical Ripen, Induct, Augment) (Adult,Obstetrics,Pediatric) CPG).  Data: Patient admitted to room 473 at 1100. Patient is a . Prenatal record reviewed.   Obstetric History       T2      L2     SAB0   TAB0   Ectopic0   Multiple0   Live Births2        # Outcome Date GA Lbr Man/2nd Weight Sex Delivery Anes PTL Lv   3 Term 17 40w2d 03:53 / 00:04   F Vag-Spont Nitrous   JEFERSON      Name: NATALIE RICHARDSON      Apgar1:  8                Apgar5: 9   2 Term 06/18/15 41w1d   3.345 kg (7 lb 6 oz) M Vag-Spont     JEFERSON      Name: Delfino Yancey SAB                         .  Medical History: History reviewed. No pertinent past medical history..  Gestational age 40w2d. Vital signs per doc flowsheet. Fetal movement present. Patient reports Contractions   as reason for admission. Support persons is present.  Action:  Report from Nila GARCIA at 0930. Care of patient assumed at 0930 Verbal consent for EFM, external fetal monitors applied. Admission assessment completed. Patient and support persons educated on labor process. Patient instructed to report change in fetal movement, contractions, vaginal leaking of fluid or bleeding, abdominal pain, or any concerns related to the pregnancy to her nurse/physician. Patient oriented to room, call light in reach.   Response: Romana Lance CNM  informed of patient arrival. Plan per provider is as ordered. Patient verbalized understanding of education and verbalized agreement with plan. Patient coping with labor via with family support.           Never

## 2021-07-27 ENCOUNTER — TRANSFERRED RECORDS (OUTPATIENT)
Dept: HEALTH INFORMATION MANAGEMENT | Facility: CLINIC | Age: 31
End: 2021-07-27

## 2021-10-09 ENCOUNTER — HEALTH MAINTENANCE LETTER (OUTPATIENT)
Age: 31
End: 2021-10-09

## 2021-10-19 PROBLEM — F32.9 MAJOR DEPRESSION: Status: ACTIVE | Noted: 2018-07-12

## 2021-10-20 LAB — PHQ9 SCORE: 9

## 2021-11-03 ENCOUNTER — TRANSFERRED RECORDS (OUTPATIENT)
Dept: HEALTH INFORMATION MANAGEMENT | Facility: CLINIC | Age: 31
End: 2021-11-03
Payer: COMMERCIAL

## 2021-11-21 NOTE — PROGRESS NOTES
Feeling well, no c/o.  Would like to have waterbirth as an option, signed consent and hep C drawn.  GBS and hgb today.  RTC 1 wk JR  
Never smoker

## 2021-12-14 ENCOUNTER — IMMUNIZATION (OUTPATIENT)
Dept: PEDIATRICS | Facility: CLINIC | Age: 31
End: 2021-12-14
Payer: COMMERCIAL

## 2021-12-14 PROCEDURE — 91300 PR COVID VAC PFIZER DIL RECON 30 MCG/0.3 ML IM: CPT

## 2021-12-14 PROCEDURE — 0004A PR COVID VAC PFIZER DIL RECON 30 MCG/0.3 ML IM: CPT

## 2021-12-20 LAB — PHQ9 SCORE: 11

## 2022-01-12 ENCOUNTER — TRANSFERRED RECORDS (OUTPATIENT)
Dept: HEALTH INFORMATION MANAGEMENT | Facility: CLINIC | Age: 32
End: 2022-01-12
Payer: COMMERCIAL

## 2022-01-24 LAB — PHQ9 SCORE: 11

## 2022-01-29 ENCOUNTER — HEALTH MAINTENANCE LETTER (OUTPATIENT)
Age: 32
End: 2022-01-29

## 2022-05-12 ENCOUNTER — TRANSFERRED RECORDS (OUTPATIENT)
Dept: HEALTH INFORMATION MANAGEMENT | Facility: CLINIC | Age: 32
End: 2022-05-12
Payer: COMMERCIAL

## 2022-07-11 ENCOUNTER — TELEPHONE (OUTPATIENT)
Dept: FAMILY MEDICINE | Facility: CLINIC | Age: 32
End: 2022-07-11

## 2022-07-11 ENCOUNTER — OFFICE VISIT (OUTPATIENT)
Dept: FAMILY MEDICINE | Facility: CLINIC | Age: 32
End: 2022-07-11
Payer: COMMERCIAL

## 2022-07-11 VITALS
TEMPERATURE: 98.1 F | WEIGHT: 205 LBS | HEIGHT: 69 IN | SYSTOLIC BLOOD PRESSURE: 124 MMHG | BODY MASS INDEX: 30.36 KG/M2 | RESPIRATION RATE: 18 BRPM | DIASTOLIC BLOOD PRESSURE: 74 MMHG | OXYGEN SATURATION: 97 % | HEART RATE: 77 BPM

## 2022-07-11 DIAGNOSIS — H60.391 INFECTIVE OTITIS EXTERNA, RIGHT: Primary | ICD-10-CM

## 2022-07-11 DIAGNOSIS — H69.91 EUSTACHIAN TUBE DYSFUNCTION, RIGHT: ICD-10-CM

## 2022-07-11 PROCEDURE — 99213 OFFICE O/P EST LOW 20 MIN: CPT | Performed by: FAMILY MEDICINE

## 2022-07-11 RX ORDER — ALBUTEROL SULFATE 90 UG/1
AEROSOL, METERED RESPIRATORY (INHALATION)
COMMUNITY
Start: 2022-07-04

## 2022-07-11 RX ORDER — CIPROFLOXACIN AND DEXAMETHASONE 3; 1 MG/ML; MG/ML
4 SUSPENSION/ DROPS AURICULAR (OTIC) 2 TIMES DAILY
Qty: 7.5 ML | Refills: 0 | Status: SHIPPED | OUTPATIENT
Start: 2022-07-11 | End: 2024-03-20

## 2022-07-11 RX ORDER — BENZONATATE 200 MG/1
CAPSULE ORAL
COMMUNITY
Start: 2022-07-04 | End: 2024-03-20

## 2022-07-11 RX ORDER — PREDNISONE 20 MG/1
TABLET ORAL
Qty: 10 TABLET | Refills: 0 | Status: SHIPPED | OUTPATIENT
Start: 2022-07-11 | End: 2024-03-20

## 2022-07-11 RX ORDER — RISPERIDONE 0.25 MG/1
TABLET ORAL
COMMUNITY
Start: 2022-05-17

## 2022-07-11 RX ORDER — BUSPIRONE HYDROCHLORIDE 30 MG/1
30 TABLET ORAL 2 TIMES DAILY
COMMUNITY
Start: 2022-01-12

## 2022-07-11 ASSESSMENT — PAIN SCALES - GENERAL: PAINLEVEL: SEVERE PAIN (7)

## 2022-07-11 ASSESSMENT — PATIENT HEALTH QUESTIONNAIRE - PHQ9
SUM OF ALL RESPONSES TO PHQ QUESTIONS 1-9: 10
10. IF YOU CHECKED OFF ANY PROBLEMS, HOW DIFFICULT HAVE THESE PROBLEMS MADE IT FOR YOU TO DO YOUR WORK, TAKE CARE OF THINGS AT HOME, OR GET ALONG WITH OTHER PEOPLE: SOMEWHAT DIFFICULT
SUM OF ALL RESPONSES TO PHQ QUESTIONS 1-9: 10

## 2022-07-11 NOTE — TELEPHONE ENCOUNTER
Patient calling.  She was seen on July 4th virtually for an ear infection at [facility] with the patient's insurance.  The Provider was able to see a picture of the patients ear drum.     The pressure has gotten slightly better but she is still experiencing RIGHT ear pressure and having pain in her ear.  Still having to use Ibuprofen that is helpful with pain but pressure/ fullness is still there, feels like full of fluid.  She is also having difficulty hearing out of her Right ear as well.    She had some fluid leak out last Tuesday, clear fluid with tinge of blood.    She was given Augmentin BID x 10 course. She has 3 days left of Augmentin    Currently no fever    She would like a provider to have her ear checked.          Christa Roblero RN

## 2022-07-11 NOTE — PROGRESS NOTES
Assessment & Plan    Diagnosis Comments   1. Infective otitis externa, right  REVIEW OF HEALTH MAINTENANCE PROTOCOL ORDERS, ciprofloxacin-dexamethasone (CIPRODEX) 0.3-0.1 % otic suspension, Adult ENT  Referral    2. Eustachian tube dysfunction, right  predniSONE (DELTASONE) 20 MG tablet, Adult ENT  Referral      She is currently on Augmentin, since July 4 for right otitis media, had virtual visit.  She continued to have fullness, pressure, pain, and feeling unable to hear.  She has no fever no chills no drainage, no headache.  No history of travel, no history of swimming.  Symptoms started after she had COVID infection, end of June, 2022/    Exam today showed she has erythematous inflammation in the ear canal.  Otherwise, eardrum is normal.  Discussed with patient likely she could have eustachian tube dysfunction.  Advised patient to continue with Augmentin, continue loratadine and Allegra.  In addition, take Flonase at bedtime.  Given a prescription for prednisone use as been prescribed.  Ciprodex for otitis externa.  Discussed with patient if symptom does not improve within the week she could follow-up with ENT.  Referral was placed today.      No follow-ups on file.    Sobeida Thomas MD  [facility]    Nena Barrios is a 32 year old presenting for the following health issues:  No chief complaint on file.      History of Present Illness       Reason for visit:  Ear infection and pain  Symptom onset:  1-2 weeks ago  Symptom intensity:  Moderate  Symptom progression:  Staying the same    She eats 4 or more servings of fruits and vegetables daily.She consumes 1 sweetened beverage(s) daily.She exercises with enough effort to increase her heart rate 10 to 19 minutes per day.  She exercises with enough effort to increase her heart rate 3 or less days per week.   She is taking medications regularly.    Today's PHQ-9         PHQ-9 Total Score: 10    PHQ-9 Q9  Thoughts of better off dead/self-harm past 2 weeks :   Not at all    How difficult have these problems made it for you to do your work, take care of things at home, or get along with other people: Somewhat difficult         Review of Systems   Constitutional, HEENT, cardiovascular, pulmonary, gi and gu systems are negative, except as otherwise noted.      Objective    There were no vitals taken for this visit.  There is no height or weight on file to calculate BMI.  Physical Exam   GENERAL: healthy, alert and no distress  HENT: normal cephalic/atraumatic, right ear: mucopurulent effusion and ear canal irriatation,  Ear drum is normal, no effusion.   and left ear: normal: no effusions, no erythema, normal landmarks  PSYCH: mentation appears normal, affect normal/bright      Sobeida Thomas MD            .  ..

## 2022-08-25 NOTE — PROGRESS NOTES
History of Present Illness - Sophie Felton is a very pleasant 32 year old female here to see me for the first time for recurrent ear issues.    She tells me that this was an atypical situation.  She had COVID at the end of , and in the beginning of July she had severe RIGHT ear pain.  She was started on oral antibioticus, and that helped the pain.  But she had continued pressure and hearing loss on the RIGHT side.  She was referred to ENT at that time.    She does still get occasional clicking in the RIGHT, butt otherwise the hearing still seems a bit off.    Otherwise no history of chronic ear disease.  No previous ear surgery.  No history of chemo or radiation therapy to the head and neck, and no major head trauma.  He denies a history of  service, no frequent firearm use.  No previous history working in an industrial environment.      Past Medical History -   Patient Active Problem List   Diagnosis     Supervision of normal pregnancy     Seasonal allergic rhinitis     Scalp psoriasis     Keratosis pilaris     Mild major depression (H)     Family history of colon cancer     39 weeks gestation of pregnancy      (normal spontaneous vaginal delivery)     Cervical cancer screening       Current Medications -   Current Outpatient Medications:      acetaminophen (TYLENOL) 325 MG tablet, Take 3 tablets (975 mg) by mouth every 6 hours as needed for mild pain or fever (Patient not taking: Reported on 2019), Disp: 20 tablet, Rfl: 0     albuterol (PROAIR HFA/PROVENTIL HFA/VENTOLIN HFA) 108 (90 Base) MCG/ACT inhaler, INHALE 2 PUFFS BY MOUTH AS NEEDED EVERY 4 HOURS UNTIL DIRECTED TO STOP, Disp: , Rfl:      ammonium lactate (LAC-HYDRIN) 12 % lotion, Apply topically 2 times daily as needed for dry skin (Patient not taking: No sig reported), Disp: 360 g, Rfl: 3     amoxicillin-clavulanate (AUGMENTIN) 875-125 MG tablet, TAKE 1 TABLET BY MOUTH EVERY 12 HOURS FOR 10 DAYS OR UNTIL DIRECTED TO STOP., Disp: ,  Rfl:      benzonatate (TESSALON) 200 MG capsule, TAKE 1 CAPSULE 3 TIMES A DAY AS NEEDED UNTIL DIRECTED TO STOP. TAKE ONLY AS NEEDED., Disp: , Rfl:      busPIRone (BUSPAR) 5 MG tablet, , Disp: , Rfl:      busPIRone HCl (BUSPAR) 30 MG tablet, Take 30 mg by mouth 2 times daily, Disp: , Rfl:      ciprofloxacin-dexamethasone (CIPRODEX) 0.3-0.1 % otic suspension, Place 4 drops into the right ear 2 times daily, Disp: 7.5 mL, Rfl: 0     Docusate Sodium (COLACE PO), , Disp: , Rfl:      escitalopram (LEXAPRO) 20 MG tablet, TAKE 1 TABLET BY MOUTH EVERY DAY, Disp: 90 tablet, Rfl: 0     ferrous sulfate (FE TABS) 325 (65 Fe) MG EC tablet, Take 1 tablet (325 mg) by mouth daily (Patient not taking: Reported on 5/30/2019), Disp: 30 tablet, Rfl: 2     fexofenadine (ALLEGRA) 180 MG tablet, Take 1 tablet by mouth daily, Disp: , Rfl:      fluocinonide (LIDEX) 0.05 % external ointment, Apply topically 2 times daily, Disp: 30 g, Rfl: 1     loratadine (CLARITIN) 10 MG tablet, Take 20 mg by mouth daily, Disp: , Rfl:      metroNIDAZOLE (METROLOTION) 0.75 % external lotion, Apply once or twice daily, Disp: 60 mL, Rfl: 3     polyethylene glycol (MIRALAX/GLYCOLAX) packet, Take 17 g by mouth daily (Patient not taking: Reported on 5/30/2019), Disp: 30 packet, Rfl: 0     predniSONE (DELTASONE) 20 MG tablet, 2 tab daily, Disp: 10 tablet, Rfl: 0     Prenatal Vit-Fe Fumarate-FA (PRENATAL MULTIVITAMIN  PLUS IRON) 27-0.8 MG TABS per tablet, Take 1 tablet by mouth daily (Patient not taking: Reported on 7/11/2022), Disp: , Rfl:      risperiDONE (RISPERDAL) 0.25 MG tablet, TAKE 1 TABLET BY MOUTH TWICE DAILY AS NEEDED FOR ANXIETY, AGITATION, OR SLEEP, Disp: , Rfl:      senna-docusate (SENOKOT-S/PERICOLACE) 8.6-50 MG tablet, Take 1 tablet by mouth 2 times daily (Patient not taking: Reported on 5/30/2019), Disp: 30 tablet, Rfl: 0    Allergies -   Allergies   Allergen Reactions     Blood Transfusion Related (Informational Only) Other (See Comments)      "Patient has a history of a clinically significant antibody against RBC antigens.  A delay in compatible RBCs may occur.     Septra [Sulfamethoxazole W/Trimethoprim]      Sorbitan      Sulfamethoxazole-Trimethoprim Hives     As a child       Social History -   Social History     Socioeconomic History     Marital status:      Number of children: 2   Occupational History     Occupation: Nurse   Tobacco Use     Smoking status: Never Smoker     Smokeless tobacco: Never Used   Vaping Use     Vaping Use: Never used   Substance and Sexual Activity     Alcohol use: No     Comment: once a week     Drug use: No     Sexual activity: Yes     Partners: Male     Birth control/protection: Condom   Other Topics Concern     Parent/sibling w/ CABG, MI or angioplasty before 65F 55M? No       Family History -   Family History   Problem Relation Age of Onset     Diabetes Brother      Substance Abuse Brother      Thyroid Disease Brother      Colon Cancer Father         age 62     Thyroid Disease Maternal Grandmother      Prostate Cancer Maternal Grandfather        Review of Systems - As per HPI and PMHx, otherwise 10+ system review of the head and neck, and general constitution is negative.    Physical Exam  /84   Pulse 83   Ht 1.74 m (5' 8.5\")   Wt 94.7 kg (208 lb 12.8 oz)   SpO2 98%   BMI 31.29 kg/m      General - The patient is well nourished and well developed, and appears to have good nutritional status.  Alert and oriented to person and place, answers questions and cooperates with examination appropriately.   Head and Face - Normocephalic and atraumatic, with no gross asymmetry noted of the contour of the facial features.  The facial nerve is intact, with strong symmetric movements.  Voice and Breathing - The patient was breathing comfortably without the use of accessory muscles. There was no wheezing, stridor, or stertor.  The patients voice was clear and strong, and had appropriate pitch and quality.  Ears - The " tympanic membranes are normal in appearance, bony landmarks are intact.  No retraction, perforation, or masses.  No fluid or purulence was seen in the external canal or the middle ear. No evidence of infection of the middle ear or external canal, cerumen was normal in appearance.  Eyes - Extraocular movements intact, and the pupils were reactive to light.  Sclera were not icteric or injected, conjunctiva were pink and moist.  Mouth - Examination of the oral cavity showed pink, healthy oral mucosa. No lesions or ulcerations noted.  The tongue was mobile and midline, and the dentition were in good condition.    Throat - The walls of the oropharynx were smooth, pink, moist, symmetric, and had no lesions or ulcerations.  The tonsillar pillars and soft palate were symmetric.  The uvula was midline on elevation.    Neck - Normal midline excursion of the laryngotracheal complex during swallowing.  Full range of motion on passive movement.  Palpation of the occipital, submental, submandibular, internal jugular chain, and supraclavicular nodes did not demonstrate any abnormal lymph nodes or masses.  The carotid pulse was palpable bilaterally.  Palpation of the thyroid was soft and smooth, with no nodules or goiter appreciated.  The trachea was mobile and midline.  Nose - External contour is symmetric, no gross deflection or scars.  Nasal mucosa is pink and moist with no abnormal mucus.  The septum was midline and non-obstructive, turbinates of normal size and position.  No polyps, masses, or purulence noted on examination.    Audiologic Studies - An audiogram and tympanogram were performed today as part of the evaluation and personally reviewed. The tympanogram shows a normal Type A curve, with normal canal volume and middle ear pressure.  There is no sign of eustachian tube dysfunction or middle ear effusion.  The audiogram was also normal.  The sensorineural hearing was age-appropriate, with no evidence of conductive  hearing loss or significant asymmetry.      A/P - Sophie Felton is a 32 year old female  (H60.8X3) Chronic eczematous otitis externa of both ears  (primary encounter diagnosis)  (H60.391) Infective otitis externa, right  (H69.81) Eustachian tube dysfunction, right    It would seem she had a post-covid eustachian tube dysfunction that led to otitis media.  But at this point the exam and audio are normal, and she has totally healed it up.    As an aside, she does have baseline eczematous otitis externa and I have prescribed dermotic.

## 2022-08-29 ENCOUNTER — OFFICE VISIT (OUTPATIENT)
Dept: AUDIOLOGY | Facility: CLINIC | Age: 32
End: 2022-08-29
Payer: COMMERCIAL

## 2022-08-29 ENCOUNTER — OFFICE VISIT (OUTPATIENT)
Dept: OTOLARYNGOLOGY | Facility: CLINIC | Age: 32
End: 2022-08-29
Attending: FAMILY MEDICINE

## 2022-08-29 VITALS
BODY MASS INDEX: 30.93 KG/M2 | HEART RATE: 83 BPM | OXYGEN SATURATION: 98 % | DIASTOLIC BLOOD PRESSURE: 84 MMHG | SYSTOLIC BLOOD PRESSURE: 134 MMHG | WEIGHT: 208.8 LBS | HEIGHT: 69 IN

## 2022-08-29 DIAGNOSIS — H69.91 EUSTACHIAN TUBE DYSFUNCTION, RIGHT: ICD-10-CM

## 2022-08-29 DIAGNOSIS — H60.391 INFECTIVE OTITIS EXTERNA, RIGHT: ICD-10-CM

## 2022-08-29 DIAGNOSIS — H60.8X3 CHRONIC ECZEMATOUS OTITIS EXTERNA OF BOTH EARS: Primary | ICD-10-CM

## 2022-08-29 DIAGNOSIS — H69.91 DYSFUNCTION OF EUSTACHIAN TUBE, RIGHT: Primary | ICD-10-CM

## 2022-08-29 PROCEDURE — 92557 COMPREHENSIVE HEARING TEST: CPT

## 2022-08-29 PROCEDURE — 99203 OFFICE O/P NEW LOW 30 MIN: CPT | Performed by: OTOLARYNGOLOGY

## 2022-08-29 PROCEDURE — 92550 TYMPANOMETRY & REFLEX THRESH: CPT

## 2022-08-29 RX ORDER — FLUOCINOLONE ACETONIDE 0.11 MG/ML
5 OIL AURICULAR (OTIC) EVERY OTHER DAY
Qty: 20 ML | Refills: 4 | Status: SHIPPED | OUTPATIENT
Start: 2022-08-29 | End: 2024-03-20

## 2022-08-29 ASSESSMENT — PAIN SCALES - GENERAL: PAINLEVEL: NO PAIN (0)

## 2022-08-29 NOTE — PROGRESS NOTES
AUDIOLOGY REPORT:    Patient was referred to [facility] from ENT by Dr. Vincent for a hearing examination. Patient reports recent ear infection in the right ear which she feels caused a hearing decrease. Since the infection, which was couple of months ago, Ct feels her hearing has gotten better but is still not 'normal'. She denies pain, pressure, drainage, family history of hearing loss, history of noise exposure, history of ear surgeries and dizziness. She does report occasional 'clicking' in her right ear     Testing:    Otoscopy:   Otoscopic exam indicates ears are clear of cerumen bilaterally     Tympanograms:    RIGHT: normal eardrum mobility     LEFT:   normal eardrum mobility    Reflexes (reported by stimulus ear): 1000 Hz  RIGHT: Ipsilateral is present at normal levels  RIGHT: Contralateral is present at normal levels  LEFT:   Ipsilateral is present at normal levels  LEFT:   Contralateral is present at normal levels    Thresholds:   Pure Tone Thresholds assessed using conventional audiometry with good  reliability from 250-8000 Hz bilaterally using insert earphones and circumaural headphones     RIGHT:  normal Hearing sensitivity at frequencies tested    LEFT:    normal Hearing sensitivity at frequencies tested    Speech Reception Threshold:    RIGHT: 10 dB HL    LEFT:   0 dB HL  Speech Reception Thresholds are in good agreement with pure tone thresholds    Word Recognition Score:     RIGHT: 100% at 50 dB HL using NU-6 recorded word list.    LEFT:   100% at 50 dB HL using NU-6 recorded word list.    Discussed results with the patient.     Patient was returned to ENT for follow up.     Brian Barajas, CCC-A  Licensed Audiologist  [place] #[id number]    08/29/22

## 2022-08-29 NOTE — LETTER
2022         RE: Sophie Felton  [address] [facility] [address]        Dear Colleague,    Thank you for referring your patient, Sophie Felton, to the [facility]. Please see a copy of my visit note below.    History of Present Illness - Sophie Felton is a very pleasant 32 year old female here to see me for the first time for recurrent ear issues.    She tells me that this was an atypical situation.  She had COVID at the end of , and in the beginning of July she had severe RIGHT ear pain.  She was started on oral antibioticus, and that helped the pain.  But she had continued pressure and hearing loss on the RIGHT side.  She was referred to ENT at that time.    She does still get occasional clicking in the RIGHT, butt otherwise the hearing still seems a bit off.    Otherwise no history of chronic ear disease.  No previous ear surgery.  No history of chemo or radiation therapy to the head and neck, and no major head trauma.  He denies a history of  service, no frequent firearm use.  No previous history working in an industrial environment.      Past Medical History -   Patient Active Problem List   Diagnosis     Supervision of normal pregnancy     Seasonal allergic rhinitis     Scalp psoriasis     Keratosis pilaris     Mild major depression (H)     Family history of colon cancer     39 weeks gestation of pregnancy      (normal spontaneous vaginal delivery)     Cervical cancer screening       Current Medications -   Current Outpatient Medications:      acetaminophen (TYLENOL) 325 MG tablet, Take 3 tablets (975 mg) by mouth every 6 hours as needed for mild pain or fever (Patient not taking: Reported on 2019), Disp: 20 tablet, Rfl: 0     albuterol (PROAIR HFA/PROVENTIL HFA/VENTOLIN HFA) 108 (90 Base) MCG/ACT inhaler, INHALE 2 PUFFS BY MOUTH AS NEEDED EVERY 4 HOURS UNTIL DIRECTED TO STOP, Disp: , Rfl:      ammonium lactate (LAC-HYDRIN) 12 % lotion,  Apply topically 2 times daily as needed for dry skin (Patient not taking: No sig reported), Disp: 360 g, Rfl: 3     amoxicillin-clavulanate (AUGMENTIN) 875-125 MG tablet, TAKE 1 TABLET BY MOUTH EVERY 12 HOURS FOR 10 DAYS OR UNTIL DIRECTED TO STOP., Disp: , Rfl:      benzonatate (TESSALON) 200 MG capsule, TAKE 1 CAPSULE 3 TIMES A DAY AS NEEDED UNTIL DIRECTED TO STOP. TAKE ONLY AS NEEDED., Disp: , Rfl:      busPIRone (BUSPAR) 5 MG tablet, , Disp: , Rfl:      busPIRone HCl (BUSPAR) 30 MG tablet, Take 30 mg by mouth 2 times daily, Disp: , Rfl:      ciprofloxacin-dexamethasone (CIPRODEX) 0.3-0.1 % otic suspension, Place 4 drops into the right ear 2 times daily, Disp: 7.5 mL, Rfl: 0     Docusate Sodium (COLACE PO), , Disp: , Rfl:      escitalopram (LEXAPRO) 20 MG tablet, TAKE 1 TABLET BY MOUTH EVERY DAY, Disp: 90 tablet, Rfl: 0     ferrous sulfate (FE TABS) 325 (65 Fe) MG EC tablet, Take 1 tablet (325 mg) by mouth daily (Patient not taking: Reported on 5/30/2019), Disp: 30 tablet, Rfl: 2     fexofenadine (ALLEGRA) 180 MG tablet, Take 1 tablet by mouth daily, Disp: , Rfl:      fluocinonide (LIDEX) 0.05 % external ointment, Apply topically 2 times daily, Disp: 30 g, Rfl: 1     loratadine (CLARITIN) 10 MG tablet, Take 20 mg by mouth daily, Disp: , Rfl:      metroNIDAZOLE (METROLOTION) 0.75 % external lotion, Apply once or twice daily, Disp: 60 mL, Rfl: 3     polyethylene glycol (MIRALAX/GLYCOLAX) packet, Take 17 g by mouth daily (Patient not taking: Reported on 5/30/2019), Disp: 30 packet, Rfl: 0     predniSONE (DELTASONE) 20 MG tablet, 2 tab daily, Disp: 10 tablet, Rfl: 0     Prenatal Vit-Fe Fumarate-FA (PRENATAL MULTIVITAMIN  PLUS IRON) 27-0.8 MG TABS per tablet, Take 1 tablet by mouth daily (Patient not taking: Reported on 7/11/2022), Disp: , Rfl:      risperiDONE (RISPERDAL) 0.25 MG tablet, TAKE 1 TABLET BY MOUTH TWICE DAILY AS NEEDED FOR ANXIETY, AGITATION, OR SLEEP, Disp: , Rfl:      senna-docusate  "(SENOKOT-S/PERICOLACE) 8.6-50 MG tablet, Take 1 tablet by mouth 2 times daily (Patient not taking: Reported on 5/30/2019), Disp: 30 tablet, Rfl: 0    Allergies -   Allergies   Allergen Reactions     Blood Transfusion Related (Informational Only) Other (See Comments)     Patient has a history of a clinically significant antibody against RBC antigens.  A delay in compatible RBCs may occur.     Septra [Sulfamethoxazole W/Trimethoprim]      Sorbitan      Sulfamethoxazole-Trimethoprim Hives     As a child       Social History -   Social History     Socioeconomic History     Marital status:      Number of children: 2   Occupational History     Occupation: Nurse   Tobacco Use     Smoking status: Never Smoker     Smokeless tobacco: Never Used   Vaping Use     Vaping Use: Never used   Substance and Sexual Activity     Alcohol use: No     Comment: once a week     Drug use: No     Sexual activity: Yes     Partners: Male     Birth control/protection: Condom   Other Topics Concern     Parent/sibling w/ CABG, MI or angioplasty before 65F 55M? No       Family History -   Family History   Problem Relation Age of Onset     Diabetes Brother      Substance Abuse Brother      Thyroid Disease Brother      Colon Cancer Father         age 62     Thyroid Disease Maternal Grandmother      Prostate Cancer Maternal Grandfather        Review of Systems - As per HPI and PMHx, otherwise 10+ system review of the head and neck, and general constitution is negative.    Physical Exam  /84   Pulse 83   Ht 1.74 m (5' 8.5\")   Wt 94.7 kg (208 lb 12.8 oz)   SpO2 98%   BMI 31.29 kg/m      General - The patient is well nourished and well developed, and appears to have good nutritional status.  Alert and oriented to person and place, answers questions and cooperates with examination appropriately.   Head and Face - Normocephalic and atraumatic, with no gross asymmetry noted of the contour of the facial features.  The facial nerve is " intact, with strong symmetric movements.  Voice and Breathing - The patient was breathing comfortably without the use of accessory muscles. There was no wheezing, stridor, or stertor.  The patients voice was clear and strong, and had appropriate pitch and quality.  Ears - The tympanic membranes are normal in appearance, bony landmarks are intact.  No retraction, perforation, or masses.  No fluid or purulence was seen in the external canal or the middle ear. No evidence of infection of the middle ear or external canal, cerumen was normal in appearance.  Eyes - Extraocular movements intact, and the pupils were reactive to light.  Sclera were not icteric or injected, conjunctiva were pink and moist.  Mouth - Examination of the oral cavity showed pink, healthy oral mucosa. No lesions or ulcerations noted.  The tongue was mobile and midline, and the dentition were in good condition.    Throat - The walls of the oropharynx were smooth, pink, moist, symmetric, and had no lesions or ulcerations.  The tonsillar pillars and soft palate were symmetric.  The uvula was midline on elevation.    Neck - Normal midline excursion of the laryngotracheal complex during swallowing.  Full range of motion on passive movement.  Palpation of the occipital, submental, submandibular, internal jugular chain, and supraclavicular nodes did not demonstrate any abnormal lymph nodes or masses.  The carotid pulse was palpable bilaterally.  Palpation of the thyroid was soft and smooth, with no nodules or goiter appreciated.  The trachea was mobile and midline.  Nose - External contour is symmetric, no gross deflection or scars.  Nasal mucosa is pink and moist with no abnormal mucus.  The septum was midline and non-obstructive, turbinates of normal size and position.  No polyps, masses, or purulence noted on examination.    Audiologic Studies - An audiogram and tympanogram were performed today as part of the evaluation and personally reviewed. The  tympanogram shows a normal Type A curve, with normal canal volume and middle ear pressure.  There is no sign of eustachian tube dysfunction or middle ear effusion.  The audiogram was also normal.  The sensorineural hearing was age-appropriate, with no evidence of conductive hearing loss or significant asymmetry.      A/P - Sophie Felton is a 32 year old female  (H60.8X3) Chronic eczematous otitis externa of both ears  (primary encounter diagnosis)  (H60.391) Infective otitis externa, right  (H69.81) Eustachian tube dysfunction, right    It would seem she had a post-covid eustachian tube dysfunction that led to otitis media.  But at this point the exam and audio are normal, and she has totally healed it up.    As an aside, she does have baseline eczematous otitis externa and I have prescribed dermotic.          Again, thank you for allowing me to participate in the care of your patient.        Sincerely,        Giles Vincent MD

## 2022-09-11 ENCOUNTER — HEALTH MAINTENANCE LETTER (OUTPATIENT)
Age: 32
End: 2022-09-11

## 2022-09-15 ENCOUNTER — TRANSFERRED RECORDS (OUTPATIENT)
Dept: HEALTH INFORMATION MANAGEMENT | Facility: CLINIC | Age: 32
End: 2022-09-15

## 2022-12-08 NOTE — PLAN OF CARE
Problem: Goal Outcome Summary  Goal: Goal Outcome Summary  Outcome: Improving  Patient has been stable this shift. VSS. Pain controlled well. Patient denies any needs at this time. Independent with infant cares. Continue with plan of care.       Received refill request for 1) metFORMIN (GLUCOPHAGE) 500 MG tablet. 2) glipiZIDE (GLUCOTROL) 5 MG 24 hr tablet  Request is Approved   because refill protocol met approval criteria  LOV and or labs were verified to be correct  Refill sent to pharmacy

## 2022-12-14 ENCOUNTER — TRANSFERRED RECORDS (OUTPATIENT)
Dept: FAMILY MEDICINE | Facility: CLINIC | Age: 32
End: 2022-12-14

## 2023-01-17 ENCOUNTER — TRANSFERRED RECORDS (OUTPATIENT)
Dept: HEALTH INFORMATION MANAGEMENT | Facility: CLINIC | Age: 33
End: 2023-01-17

## 2023-04-18 NOTE — L&D DELIVERY NOTE
[facility] Hospitalist Discharge Summary      Admission Type:   Observation     MRN:   [id number]    Admission Date:  4/16/2023     Discharge Date:  4/18/2023      Discharge Destination  Discharge to home    PCP  Madelyn Caldwell DO  PCP notified of discharge through Epic message.        Discharge Diagnosis  Active Hospital Problems    Diagnosis    • Acute deep vein thrombosis (DVT) of right peroneal vein (CMD)        Hospital Course  Sonia Watson is a 79 year old female     79 year old female with history of hypothyroidism, anxiety, depression, fatty liver disease, GERD, dyslipidemia, restless leg syndrome, sarcoidosis, severe obstructive sleep apnea not on CPAP.  Patient presented from nursing facility with right-sided hip pain and leg pain ongoing since discharge to rehab facility.  No clear aggravating or alleviating factors or other associated symptoms     Leg pain  Right lower extremity acute DVT  Recent findings of Intractable right hip pain after injection for trochanteric bursitis.  Recent findings of Marked tendinopathy with suspected partial tear at the gluteus minimus and medius insertion at the greater trochanter  X-ray of the hips with mild osteopenia but no acute fractures or dislocations of the right hip  Venous duplex with acute occlusive thrombus inthe right peroneal vein  Managed with anticoagulation  Hematology oncology service on consult     Urinary retention needing franklin catheter  Urine cx with Enterococcus faecalis, likely colonization  Patient evaluated by urology last admission  Franklin catheter inserted on 4/7.   voiding trial ordered  Stable. Continue present management  Outpatient follow-up     Hypothyroidism  Stable. Continue present management  Outpatient follow-up     Hypertension  White coat hypertension  Stable. Continue present management  Outpatient follow-up     Dyslipidemia  Statin intolerance  Stable. Continue present management  Outpatient follow-up     GERD  Stable.  Delivery Note:  Sophie Felton is a 29 year old  at 39w2d who presented to L&D for elective induction of labor. Pregnancy was complicated by: depression.    Patient's SVE on admission was 3/80/-2, PV misoprostol x1 was used for cervical ripening, and AROM occurred at 5 cm. Labor progressed with pitocin augmentation. The patient progressed to complete and pushed for approximately 2 minutes. FHT was Cat 1 for the labor course. She subsequently had an uncomplicated  of a female infant at 2121 on 2019 . The infant head was delivered in direct OA position. No Nuchal. Apgars were 8 and 8 and 1 and 5 minutes. Weight 3250g. Routine cord blood was obtained. IV pitocin was started for active management of the third stage. The placenta was delivered with gentle downward traction. It was found to be intact with a three vessel cord. Uterine tone was suboptimal. IV pitocin rate was doubled.  She received more uterine massage and bleeding continued.  A uterine sweep was performed with large return of blood clot but no retained products of conception.  Her bleeding slowed but continued.  A thorough vaginal exam was performed and an approximate 2 cm long posterior laceration of the cervix was noted.  The cervix was grasped with ringed forceps and repaired with a running stitch of 0-monocryl.  Her perineum was otherwise intact.  Upon final inspection, there was good hemostasis and there were no retained lap sponges. Instrument and sharp count was correct with exception of one lap missing. See note by Dr. Matias regarding management of missing lap. EBL: 1000 mL    Dr. Matias was present for delivery    Amy Schumer, MD  Obstetrics and Gyncology, PGY-2  2019 , 10:28 PM      I supervised this delivery and repair of cervical laceration.   Patient did not have an epidural.  Used nitrous oxide intermittently.  When she was 6 cm dilated she felt intense pressure to push.  She felt relief on her hands and  Continue present management  Outpatient follow-up     Fatty liver disease  Stable. Continue present management  Outpatient follow-up     Depression   anxiety  Stable. Continue present management  Outpatient follow-up     Sarcoidosis  Stable. Continue present management  Outpatient follow-up     Severe obstructive sleep apnea  Intolerant of CPAP   Stable. Continue present management  Outpatient follow-up             Patient in stable condition for discharge.   Patient was cleared for discharge by all participating consult services      Code Status  Code Status Information     Code Status    Full Resuscitation           Vitals  Vital Last Value 24 Hour Range   Temperature 98 °F (36.7 °C) (04/18/23 0509) Temp  Min: 97.7 °F (36.5 °C)  Max: 98.4 °F (36.9 °C)   Pulse 68 (04/18/23 0509) Pulse  Min: 65  Max: 79   Respiratory 14 (04/18/23 0556) Resp  Min: 14  Max: 18   Non-Invasive  Blood Pressure (!) 157/76 (04/17/23 2045) BP  Min: 146/75  Max: 157/76   Pulse Oximetry 93 % (04/18/23 0509) SpO2  Min: 93 %  Max: 98 %   Arterial   Blood Pressure   No data recorded       Physical Exam  General: alert, oriented. not in acute distress  Psychiatry: cooperative, normal mood and affect.   Neurology: cranial nerves grossly intact.   Eyes: pupils equal reactive to light, extraocular motion is intact. No jaundice. No palor.   HEENT: Neck supple. Oral mucosa is moist.   no Jugular venous distension. no lymphadenopathy.   Lungs: Clear to auscultation bilaterally  Heart: regular rate and rhythm, I did not hear any murmurs, rubs or gallops  Abdomen: not tender, not distended, positive bowel sounds  Lower limbs: no edema. no joint swelling or tenderness.           Labs  Recent Results (from the past 24 hour(s))   Urinalysis & Reflex Microscopy    Collection Time: 04/17/23  8:46 PM   Result Value Ref Range    COLOR, URINALYSIS Yellow     APPEARANCE, URINALYSIS Cloudy     GLUCOSE, URINALYSIS Negative Negative mg/dL    BILIRUBIN, URINALYSIS  knees.  She delivered in that position and sustained a cervical laceration.  With her intense urge to push, I suspect that she was incompletely dilated when she delivered.  She delivered rapidly after 2 contractions on her hands and knees.  The cervical laceration was identified after persistent bleeding was noted.  It was easily repaired as noted above.  Once that repair was done her bleeding slowed down to a very small trickle.   QBL was not possible so we estimated blood loss at ~ 1000 ml.    She was given 400 mcg of buccal cytotec following large gushes of blood along with the pitocin running in.     Lulú Matias MD    Negative Negative    KETONES, URINALYSIS Negative Negative mg/dL    SPECIFIC GRAVITY, URINALYSIS 1.010 1.005 - 1.030    OCCULT BLOOD, URINALYSIS Moderate (A) Negative    PH, URINALYSIS 6.0 5.0 - 7.0    PROTEIN, URINALYSIS Trace (A) Negative mg/dL    UROBILINOGEN, URINALYSIS 0.2 0.2, 1.0 mg/dL    NITRITE, URINALYSIS Positive (A) Negative    LEUKOCYTE ESTERASE, URINALYSIS Large (A) Negative    SQUAMOUS EPITHELIAL, URINALYSIS None Seen None Seen, 1 to 5 /hpf    ERYTHROCYTES, URINALYSIS 1 to 2 None Seen, 1 to 2 /hpf    LEUKOCYTES, URINALYSIS >100 (A) None Seen, 1 to 5 /hpf    BACTERIA, URINALYSIS Large (A) None Seen /hpf    HYALINE CASTS, URINALYSIS None Seen None Seen, 1 to 5 /lpf   Basic Metabolic Panel    Collection Time: 04/18/23  6:25 AM   Result Value Ref Range    Fasting Status      Sodium 146 (H) 135 - 145 mmol/L    Potassium 4.4 3.4 - 5.1 mmol/L    Chloride 104 97 - 110 mmol/L    Carbon Dioxide 29 21 - 32 mmol/L    Anion Gap 17 7 - 19 mmol/L    Glucose 107 (H) 70 - 99 mg/dL    BUN 11 6 - 20 mg/dL    Creatinine 0.83 0.51 - 0.95 mg/dL    Glomerular Filtration Rate 71 >=60    BUN/Cr 13 7 - 25    Calcium 9.1 8.4 - 10.2 mg/dL   CBC No Differential    Collection Time: 04/18/23  6:25 AM   Result Value Ref Range    WBC 9.6 4.2 - 11.0 K/mcL    RBC 4.59 4.00 - 5.20 mil/mcL    HGB 13.8 12.0 - 15.5 g/dL    HCT 42.1 36.0 - 46.5 %    MCV 91.7 78.0 - 100.0 fl    MCH 30.1 26.0 - 34.0 pg    MCHC 32.8 32.0 - 36.5 g/dL     140 - 450 K/mcL    RDW-CV 14.0 11.0 - 15.0 %    RDW-SD 46.7 39.0 - 50.0 fL    NRBC 0 <=0 /100 WBC       Imaging  XR HIP 2 VIEWS RIGHT AND PELVIS   Final Result   RESULTS/IMPRESSION:   Noted is mild osteopenia.  The frontal view of pelvis and bilateral hips   are symmetric.  There is no acute fracture or dislocation of right hip.    There is mild DJD at the bilateral hip joints with marginal osteophytes   formation, greater right.  Mild spondylosis is present at the visualized   the lower lumbar sacral  spine.      Electronically Signed by: ROSALINDA SHEIKH MD    Signed on: 4/16/2023 3:58 PM    Workstation ID: [id number]      US VASC LOWER EXTREMITY VENOUS DUPLEX RIGHT   Final Result   Addendum (preliminary) 1 of 1      ADDENDUM: This is to correct voice-recognition error in RESULTS: \" The   upper cranium vein is patent.  \" should be replaced by \"The other peroneal   vein is patent.   \".      Electronically Signed by: ROSALINDA SHEIKH MD    Signed on: 4/16/2023 3:53 PM    Workstation ID: [id number]      Final   Acute occlusive thrombus in one of the right peroneal veins.      Electronically Signed by: ROSALINDA SHEIKH MD    Signed on: 4/16/2023 2:47 PM    Workstation ID: [id number]          Pathology  * Cannot find OR log *    Test Results Pending At Discharge  Pending Labs     Order Current Status    Gold Top In process    Urine, Bacterial Culture In process            Discharge Medications     Summary of your Discharge Medications      ASK your doctor about these medications      Details   hydrOXYzine 25 MG tablet  Commonly known as: ATARAX  Ask about: Which instructions should I use?   Take 25 mg by mouth at bedtime.     ibuprofen 600 MG tablet  Commonly known as: MOTRIN  Ask about: Which instructions should I use?   Take 600 mg by mouth every 6 hours as needed for Pain.              Discharge Instructions  Follow up  Follow-up Information    None            Time  I spent 40 minutes completing this patient's discharge.

## 2023-05-06 ENCOUNTER — HEALTH MAINTENANCE LETTER (OUTPATIENT)
Age: 33
End: 2023-05-06

## 2023-05-12 ENCOUNTER — TRANSFERRED RECORDS (OUTPATIENT)
Dept: HEALTH INFORMATION MANAGEMENT | Facility: CLINIC | Age: 33
End: 2023-05-12
Payer: COMMERCIAL

## 2023-07-11 ENCOUNTER — TRANSFERRED RECORDS (OUTPATIENT)
Dept: HEALTH INFORMATION MANAGEMENT | Facility: CLINIC | Age: 33
End: 2023-07-11
Payer: COMMERCIAL

## 2023-11-29 ENCOUNTER — TRANSFERRED RECORDS (OUTPATIENT)
Dept: HEALTH INFORMATION MANAGEMENT | Facility: CLINIC | Age: 33
End: 2023-11-29
Payer: COMMERCIAL

## 2024-03-19 SDOH — HEALTH STABILITY: PHYSICAL HEALTH: ON AVERAGE, HOW MANY DAYS PER WEEK DO YOU ENGAGE IN MODERATE TO STRENUOUS EXERCISE (LIKE A BRISK WALK)?: 2 DAYS

## 2024-03-19 SDOH — HEALTH STABILITY: PHYSICAL HEALTH: ON AVERAGE, HOW MANY MINUTES DO YOU ENGAGE IN EXERCISE AT THIS LEVEL?: 20 MIN

## 2024-03-19 ASSESSMENT — PATIENT HEALTH QUESTIONNAIRE - PHQ9
SUM OF ALL RESPONSES TO PHQ QUESTIONS 1-9: 6
10. IF YOU CHECKED OFF ANY PROBLEMS, HOW DIFFICULT HAVE THESE PROBLEMS MADE IT FOR YOU TO DO YOUR WORK, TAKE CARE OF THINGS AT HOME, OR GET ALONG WITH OTHER PEOPLE: SOMEWHAT DIFFICULT
SUM OF ALL RESPONSES TO PHQ QUESTIONS 1-9: 6

## 2024-03-19 ASSESSMENT — SOCIAL DETERMINANTS OF HEALTH (SDOH): HOW OFTEN DO YOU GET TOGETHER WITH FRIENDS OR RELATIVES?: TWICE A WEEK

## 2024-03-19 NOTE — COMMUNITY RESOURCES LIST (ENGLISH)
March 19, 2024           YOUR PERSONALIZED LIST OF SERVICES & PROGRAMS           NAVIGATION    Eligibility Screening      [employer] [place] - SNAP (formerly food stamps) Screening and Application help  Phone: [phone]  Website: https://www.[employer].org/programs/[place]-food-helpline/  Language: English  Hours: Mon 10:00 AM - 5:00 PM Tue 10:00 AM - 5:00 PM Wed 10:00 AM - 5:00 PM Thu 10:00 AM - 5:00 PM Fri 10:00 AM - 5:00 PM  Fee: Free  Accessibility: [place] accessible, Blind accommodation, Deaf or hard of hearing, Translation services      [employer] - Certified Application Counselor (CAC)  Phone: [phone]  Website: https://www.[employer]org/about-us/assister-program/cacs/index.jsp  Language: English  Hours: Mon 8:00 AM - 4:00 PM Tue 8:00 AM - 4:00 PM Wed 8:00 AM - 4:00 PM Thu 8:00 AM - 4:00 PM      [employer] - Navigators  Phone: [phone]  Website: https://www.[employer]org/about-us/assister-program/navigators/index.jsp  Language: English  Hours: Mon 8:00 AM - 4:00 PM Tue 8:00 AM - 4:00 PM Wed 8:00 AM - 4:00 PM Thu 8:00 AM - 4:00 PM        ASSISTANCE    Nutrition Benefits      [employer] [place] - SNAP (formerly food stamps) Screening and Application help  Phone: [phone]  Website: https://www.[employer].org/programs/[place]-food-helpline/  Language: English  Hours: Mon 10:00 AM - 5:00 PM Tue 10:00 AM - 5:00 PM Wed 10:00 AM - 5:00 PM Thu 10:00 AM - 5:00 PM Fri 10:00 AM - 5:00 PM  Fee: Free  Accessibility: [place] accessible, Blind accommodation, Deaf or hard of hearing, Translation services      [facility] Food HelpLine  Phone: (869) 591-2085  Website: https://www.[employer].org/find-help/  Language: English, Somali  Hours: Mon 10:00 AM - 5:00 PM Tue 10:00 AM - 5:00 PM Wed 10:00 AM - 5:00 PM Thu 10:00 AM - 5:00 PM Fri 10:00 AM - 5:00 PM  Fee: Free  Accessibility: [place] accessible, Blind accommodation, Deaf or hard of hearing, Translation services      [employer]  [place] - [employer]  Phone: [phone]  Website: https://www.[employer].org/programs/market-kecia/  Language: English  Hours: Mon 10:00 AM - 5:00 PM Tue 10:00 AM - 5:00 PM Wed 10:00 AM - 5:00 PM Thu 10:00 AM - 5:00 PM Fri 10:00 AM - 5:00 PM  Fee: Self pay    Pantry      in the [place] Food in the '[place] at [facility]  [address] [place] [address] (Distance: 15.2 miles)  Phone: [phone]) [phone]  Website: https://www.goodinthehood.org/our-programs/feeding-the-future/food-in-the-[place]/  Language: English  Fee: Free  Accessibility: [place] accessible      [employer] [place] - Food pantry  [address] [place] [address] (Distance: 2.0 miles)  Phone: [phone]  Language: English, Maldivian  Fee: Free  Accessibility: [place] accessible, Translation services      [employer] - [place] [employer]  Phone: [phone]) [phone]  Website: www.[facility].org  Language: English, Maldivian  Hours: Mon 9:00 AM - 3:30 PM Tue 9:00 AM - 6:30 PM Wed 9:00 AM - 3:30 PM Thu 9:00 AM - 12:30 PM Fri 9:00 AM - 12:30 PM Sat 9:00 AM - 12:00 PM  Fee: Free               IMPORTANT NUMBERS & WEBSITES        Emergency Services  911  .   [facility]  211 http://211unitedway.org  .   Poison Control  (948) 795-8668 http://[place]poison.org http://wisconsinpoison.org  .     Suicide and Crisis Lifeline  988 http://988lifeline.org  .   Childhelp National Child Abuse Hotline  897.701.1656 http://Childhelphotline.org   .   National Sexual Assault Hotline  (401) 534-1996 (HOPE) http://Rainn.org   .     National Runaway Safeline  (619) 305-8891 (RUNAWAY) http://[employer]org  .   Pregnancy & Postpartum Support  Call/text 940-239-8527  [place]: http://ppsupportmn.org  [place]: http://[employer].com/wi  .   Substance Abuse National Helpline ([facility])  102-982-ZPXC (2980) http://Findtreatment.gov   .                DISCLAIMER: [employer] does not endorse any service providers mentioned in  this resource list. [employer] does not guarantee that the services mentioned in this resource list will be available to you or will improve your health or wellness.    [facility]

## 2024-03-20 ENCOUNTER — OFFICE VISIT (OUTPATIENT)
Dept: INTERNAL MEDICINE | Facility: CLINIC | Age: 34
End: 2024-03-20
Payer: COMMERCIAL

## 2024-03-20 VITALS
HEIGHT: 69 IN | OXYGEN SATURATION: 96 % | RESPIRATION RATE: 24 BRPM | HEART RATE: 80 BPM | SYSTOLIC BLOOD PRESSURE: 128 MMHG | WEIGHT: 215 LBS | DIASTOLIC BLOOD PRESSURE: 82 MMHG | BODY MASS INDEX: 31.84 KG/M2 | TEMPERATURE: 98.3 F

## 2024-03-20 DIAGNOSIS — E55.9 VITAMIN D DEFICIENCY: ICD-10-CM

## 2024-03-20 DIAGNOSIS — Z13.220 SCREENING FOR CHOLESTEROL LEVEL: ICD-10-CM

## 2024-03-20 DIAGNOSIS — H60.391 INFECTIVE OTITIS EXTERNA, RIGHT: ICD-10-CM

## 2024-03-20 DIAGNOSIS — Z13.29 SCREENING FOR THYROID DISORDER: ICD-10-CM

## 2024-03-20 DIAGNOSIS — H60.8X3 CHRONIC ECZEMATOUS OTITIS EXTERNA OF BOTH EARS: ICD-10-CM

## 2024-03-20 DIAGNOSIS — Z12.4 CERVICAL CANCER SCREENING: ICD-10-CM

## 2024-03-20 DIAGNOSIS — Z13.1 SCREENING FOR DIABETES MELLITUS: ICD-10-CM

## 2024-03-20 DIAGNOSIS — K58.1 IRRITABLE BOWEL SYNDROME WITH CONSTIPATION: ICD-10-CM

## 2024-03-20 DIAGNOSIS — Z00.00 ROUTINE GENERAL MEDICAL EXAMINATION AT A HEALTH CARE FACILITY: Primary | ICD-10-CM

## 2024-03-20 LAB
BASOPHILS # BLD AUTO: 0 10E3/UL (ref 0–0.2)
BASOPHILS NFR BLD AUTO: 0 %
EOSINOPHIL # BLD AUTO: 0.4 10E3/UL (ref 0–0.7)
EOSINOPHIL NFR BLD AUTO: 3 %
ERYTHROCYTE [DISTWIDTH] IN BLOOD BY AUTOMATED COUNT: 12.9 % (ref 10–15)
HBA1C MFR BLD: 5.3 % (ref 0–5.6)
HCT VFR BLD AUTO: 42 % (ref 35–47)
HGB BLD-MCNC: 13.9 G/DL (ref 11.7–15.7)
IMM GRANULOCYTES # BLD: 0 10E3/UL
IMM GRANULOCYTES NFR BLD: 0 %
LYMPHOCYTES # BLD AUTO: 2.7 10E3/UL (ref 0.8–5.3)
LYMPHOCYTES NFR BLD AUTO: 20 %
MCH RBC QN AUTO: 29.1 PG (ref 26.5–33)
MCHC RBC AUTO-ENTMCNC: 33.1 G/DL (ref 31.5–36.5)
MCV RBC AUTO: 88 FL (ref 78–100)
MONOCYTES # BLD AUTO: 0.8 10E3/UL (ref 0–1.3)
MONOCYTES NFR BLD AUTO: 6 %
NEUTROPHILS # BLD AUTO: 9.5 10E3/UL (ref 1.6–8.3)
NEUTROPHILS NFR BLD AUTO: 71 %
PLATELET # BLD AUTO: 362 10E3/UL (ref 150–450)
RBC # BLD AUTO: 4.78 10E6/UL (ref 3.8–5.2)
WBC # BLD AUTO: 13.4 10E3/UL (ref 4–11)

## 2024-03-20 PROCEDURE — 82306 VITAMIN D 25 HYDROXY: CPT

## 2024-03-20 PROCEDURE — 85025 COMPLETE CBC W/AUTO DIFF WBC: CPT

## 2024-03-20 PROCEDURE — 80061 LIPID PANEL: CPT

## 2024-03-20 PROCEDURE — 99395 PREV VISIT EST AGE 18-39: CPT

## 2024-03-20 PROCEDURE — 84443 ASSAY THYROID STIM HORMONE: CPT

## 2024-03-20 PROCEDURE — 87624 HPV HI-RISK TYP POOLED RSLT: CPT

## 2024-03-20 PROCEDURE — 36415 COLL VENOUS BLD VENIPUNCTURE: CPT

## 2024-03-20 PROCEDURE — G0145 SCR C/V CYTO,THINLAYER,RESCR: HCPCS

## 2024-03-20 PROCEDURE — 99213 OFFICE O/P EST LOW 20 MIN: CPT | Mod: 25

## 2024-03-20 PROCEDURE — 80053 COMPREHEN METABOLIC PANEL: CPT

## 2024-03-20 PROCEDURE — 83036 HEMOGLOBIN GLYCOSYLATED A1C: CPT

## 2024-03-20 RX ORDER — FLUOCINOLONE ACETONIDE 0.11 MG/ML
5 OIL AURICULAR (OTIC) EVERY OTHER DAY
Qty: 20 ML | Refills: 4 | Status: SHIPPED | OUTPATIENT
Start: 2024-03-20

## 2024-03-20 NOTE — PROGRESS NOTES
Preventive Care Visit  [facility] CHRISTOPHER Hale CNP, Family Medicine  Mar 20, 2024      Assessment & Plan     (Z00.00) Routine general medical examination at a health care facility  (primary encounter diagnosis)  Comment: Patient seen in clinic today for preventative health care exam. Discussed that patient has been having issue with diarrhea. Breast exam done this visit not abnormalities noted .Discussed referral to GI, labs, and screenings important to today's visit.   Plan: REVIEW OF HEALTH MAINTENANCE PROTOCOL ORDERS,         Comprehensive metabolic panel (BMP + Alb, Alk         Phos, ALT, AST, Total. Bili, TP), CBC with         platelets and differential        Pending     (Z12.4) Cervical cancer screening  Comment: Discussed recommendation to screen   Plan: Pap Screen with HPV - recommended age 30 - 65  years        Pending     (H60.391) Infective otitis externa, right  Comment: Chronic stable. Discussed medication refill  Plan: fluocinolone acetonide oil 0.01 % ear drops        Prescription sent to pharmacy    (H60.8X3) Chronic eczematous otitis externa of both ears  Comment: Chronic stable. Discussed medication refill  Plan: fluocinolone acetonide oil 0.01 % ear drops         Prescription sent to pharmacy    (E55.9) Vitamin D deficiency  Comment: Discussed recommendation to screen   Plan: Vitamin D Deficiency        Pending     (K58.1) Irritable bowel syndrome with constipation  Comment: Discussed that patient has been having issue with diarrhea and is having 5-6 episodes of liquid stools daily and has been ongoing for several months. Discussed GI referral for further assessment and treatment options.  Plan: Adult GI  Referral - Consult Only        Future     (Z13.220) Screening for cholesterol level  Comment: Discussed recommendation to screen   Plan: Lipid panel reflex to direct LDL Non-fasting        Pending     (Z13.1) Screening for diabetes mellitus  Comment:  "Discussed recommendation to screen   Plan: Hemoglobin A1c        Pending     (Z13.29) Screening for thyroid disorder  Comment: Discussed recommendation to screen   Plan: TSH with free T4 reflex        Pending     Patient has been advised of split billing requirements and indicates understanding: Yes      BMI  Estimated body mass index is 32.22 kg/m  as calculated from the following:    Height as of this encounter: 1.74 m (5' 8.5\").    Weight as of this encounter: 97.5 kg (215 lb).       Counseling  Appropriate preventive services were discussed with this patient, including applicable screening as appropriate for fall prevention, nutrition, physical activity, Tobacco-use cessation, weight loss and cognition.  Checklist reviewing preventive services available has been given to the patient.  Reviewed patient's diet, addressing concerns and/or questions.   She is at risk for lack of exercise and has been provided with information to increase physical activity for the benefit of her well-being.   The patient's PHQ-9 score is consistent with mild depression. She was provided with information regarding depression.           Nena Fofana is a 34 year old, presenting for the following:  Physical        3/20/2024     2:46 PM   Additional Questions   Roomed by Johanna        Health Care Directive  Patient does not have a Health Care Directive or Living Will: Not discussed at this visit     HPI    Diarrhea  Onset/Duration: few months  Description:       Consistency of stool: watery       Blood in stool: YES        Number of loose stools past 24 hours: 3  Progression of Symptoms: same and intermittent  Accompanying signs and symptoms:       Fever: No       Nausea/Vomiting: Nausea but that can be normal for her       Abdominal pain: nothing consistant        Weight loss: No       Episodes of constipation: YES  History   Ill contacts: No  Recent use of antibiotics: No  Recent travels: No  Recent medication-new or changes(Rx " or OTC): No  Precipitating or alleviating factors: None  Therapies tried and outcome: none        3/19/2024   General Health   How would you rate your overall physical health? Good   Feel stress (tense, anxious, or unable to sleep) To some extent   (!) STRESS CONCERN      3/19/2024   Nutrition   Three or more servings of calcium each day? Yes   Diet: Regular (no restrictions)    Breakfast skipped   How many servings of fruit and vegetables per day? (!) 2-3   How many sweetened beverages each day? 0-1         3/19/2024   Exercise   Days per week of moderate/strenous exercise 2 days   Average minutes spent exercising at this level 20 min   (!) EXERCISE CONCERN      3/19/2024   Social Factors   Frequency of gathering with friends or relatives Twice a week   Worry food won't last until get money to buy more No   Food not last or not have enough money for food? Yes   Do you have housing?  Yes   Are you worried about losing your housing? No   Lack of transportation? No   Unable to get utilities (heat,electricity)? No   (!) FOOD SECURITY CONCERN PRESENT      3/19/2024   Dental   Dentist two times every year? Yes         3/19/2024   TB Screening   Were you born outside of the [place]? No       Today's PHQ-9 Score:       3/19/2024     2:57 PM   PHQ-9 SCORE   PHQ-9 Total Score MyChart 6 (Mild depression)   PHQ-9 Total Score 6         3/19/2024   Substance Use   Alcohol more than 3/day or more than 7/wk No   Do you use any other substances recreationally? No     Social History     Tobacco Use    Smoking status: Never    Smokeless tobacco: Never   Vaping Use    Vaping Use: Never used   Substance Use Topics    Alcohol use: No     Comment: once a week    Drug use: No             3/19/2024   Breast Cancer Screening   Family history of breast, colon, or ovarian cancer? Yes         3/19/2024   LAST FHS-7 RESULTS   1st degree relative breast or ovarian cancer No   Any relative bilateral breast cancer No   Any male have breast cancer No  "  Any ONE woman have BOTH breast AND ovarian cancer No   Any woman with breast cancer before 50yrs No   2 or more relatives with breast AND/OR ovarian cancer No   2 or more relatives with breast AND/OR bowel cancer No        Mammogram Screening - Patient under 40 years of age: Routine Mammogram Screening not recommended.         3/19/2024   STI Screening   New sexual partner(s) since last STI/HIV test? No     History of abnormal Pap smear: NO - age 30-65 PAP every 5 years with negative HPV co-testing recommended        Latest Ref Rng & Units 5/30/2019     2:24 PM 5/30/2019     2:21 PM 10/27/2016    12:00 AM   PAP / HPV   PAP (Historical)   NIL  NIL       HPV 16 DNA NEG^Negative Negative      HPV 18 DNA NEG^Negative Negative      Other HR HPV NEG^Negative Negative          This result is from an external source.           3/19/2024   Contraception/Family Planning   Questions about contraception or family planning No        Reviewed and updated as needed this visit by Provider                    Lab work is in process      Review of Systems  Constitutional, HEENT, cardiovascular, pulmonary, gi and gu systems are negative, except as otherwise noted.     Objective    Exam  /82 (BP Location: Right arm, Patient Position: Sitting, Cuff Size: Adult Large)   Pulse 80   Temp 98.3  F (36.8  C) (Oral)   Resp 24   Ht 1.74 m (5' 8.5\")   Wt 97.5 kg (215 lb)   LMP 03/03/2024 (Approximate)   SpO2 96%   BMI 32.22 kg/m     Estimated body mass index is 32.22 kg/m  as calculated from the following:    Height as of this encounter: 1.74 m (5' 8.5\").    Weight as of this encounter: 97.5 kg (215 lb).    Physical Exam  GENERAL: alert and no distress  EYES: Eyes grossly normal to inspection, PERRL and conjunctivae and sclerae normal  HENT: ear canals and TM's normal, nose and mouth without ulcers or lesions  NECK: no adenopathy, no asymmetry, masses, or scars  RESP: lungs clear to auscultation - no rales, rhonchi or wheezes  CV: " regular rate and rhythm, normal S1 S2, no S3 or S4, no murmur, click or rub, no peripheral edema  ABDOMEN: soft, nontender, no hepatosplenomegaly, no masses and bowel sounds normal  MS: no gross musculoskeletal defects noted, no edema  SKIN: no suspicious lesions or rashes  NEURO: Normal strength and tone, mentation intact and speech normal  PSYCH: mentation appears normal, affect normal/bright        Signed Electronically by: CHRISTOPHER Gomez CNP    Answers submitted by the patient for this visit:  Patient Health Questionnaire (Submitted on 3/19/2024)  If you checked off any problems, how difficult have these problems made it for you to do your work, take care of things at home, or get along with other people?: Somewhat difficult  PHQ9 TOTAL SCORE: 6

## 2024-03-20 NOTE — PATIENT INSTRUCTIONS
Metamucil gummies  Pro and pre biotic can be helpful   Preventive Care Advice   This is general advice given by our system to help you stay healthy. However, your care team may have specific advice just for you. Please talk to your care team about your preventive care needs.  Nutrition  Eat 5 or more servings of fruits and vegetables each day.  Try wheat bread, brown rice and whole grain pasta (instead of white bread, rice, and pasta).  Get enough calcium and vitamin D. Check the label on foods and aim for 100% of the RDA (recommended daily allowance).  Lifestyle  Exercise at least 150 minutes each week   (30 minutes a day, 5 days a week).  Do muscle strengthening activities 2 days a week. These help control your weight and prevent disease.  No smoking.  Wear sunscreen to prevent skin cancer.  Have a dental exam and cleaning every 6 months.  Yearly exams  See your health care team every year to talk about:  Any changes in your health.  Any medicines your care team has prescribed.  Preventive care, family planning, and ways to prevent chronic diseases.  Shots (vaccines)   HPV shots (up to age 26), if you've never had them before.  Hepatitis B shots (up to age 59), if you've never had them before.  COVID-19 shot: Get this shot when it's due.  Flu shot: Get a flu shot every year.  Tetanus shot: Get a tetanus shot every 10 years.  Pneumococcal, hepatitis A, and RSV shots: Ask your care team if you need these based on your risk.  Shingles shot (for age 50 and up).  General health tests  Diabetes screening:  Starting at age 35, Get screened for diabetes at least every 3 years.  If you are younger than age 35, ask your care team if you should be screened for diabetes.  Cholesterol test: At age 39, start having a cholesterol test every 5 years, or more often if advised.  Bone density scan (DEXA): At age 50, ask your care team if you should have this scan for osteoporosis (brittle bones).  Hepatitis C: Get tested at least  once in your life.  STIs (sexually transmitted infections)  Before age 24: Ask your care team if you should be screened for STIs.  After age 24: Get screened for STIs if you're at risk. You are at risk for STIs (including HIV) if:  You are sexually active with more than one person.  You don't use condoms every time.  You or a partner was diagnosed with a sexually transmitted infection.  If you are at risk for HIV, ask about PrEP medicine to prevent HIV.  Get tested for HIV at least once in your life, whether you are at risk for HIV or not.  Cancer screening tests  Cervical cancer screening: If you have a cervix, begin getting regular cervical cancer screening tests at age 21. Most people who have regular screenings with normal results can stop after age 65. Talk about this with your provider.  Breast cancer scan (mammogram): If you've ever had breasts, begin having regular mammograms starting at age 40. This is a scan to check for breast cancer.  Colon cancer screening: It is important to start screening for colon cancer at age 45.  Have a colonoscopy test every 10 years (or more often if you're at risk) Or, ask your provider about stool tests like a FIT test every year or Cologuard test every 3 years.  To learn more about your testing options, visit: https://www.[email or web address]/898956.pdf.  For help making a decision, visit: https://bit.ly/pl77686.  Prostate cancer screening test: If you have a prostate and are age 55 to 69, ask your provider if you would benefit from a yearly prostate cancer screening test.  Lung cancer screening: If you are a current or former smoker age 50 to 80, ask your care team if ongoing lung cancer screenings are right for you.  For informational purposes only. Not to replace the advice of your health care provider. Copyright   2023 [facility] [employer]. All rights reserved. Clinically reviewed by the [facility]itions [facility]. [employer] [id number] - REV 01/24.    Learning About  Stress  What is stress?     Stress is your body's response to a hard situation. Your body can have a physical, emotional, or mental response. Stress is a fact of life for most people, and it affects everyone differently. What causes stress for you may not be stressful for someone else.  A lot of things can cause stress. You may feel stress when you go on a job interview, take a test, or run a race. This kind of short-term stress is normal and even useful. It can help you if you need to work hard or react quickly. For example, stress can help you finish an important job on time.  Long-term stress is caused by ongoing stressful situations or events. Examples of long-term stress include long-term health problems, ongoing problems at work, or conflicts in your family. Long-term stress can harm your health.  How does stress affect your health?  When you are stressed, your body responds as though you are in danger. It makes hormones that speed up your heart, make you breathe faster, and give you a burst of energy. This is called the fight-or-flight stress response. If the stress is over quickly, your body goes back to normal and no harm is done.  But if stress happens too often or lasts too long, it can have bad effects. Long-term stress can make you more likely to get sick, and it can make symptoms of some diseases worse. If you tense up when you are stressed, you may develop neck, shoulder, or low back pain. Stress is linked to high blood pressure and heart disease.  Stress also harms your emotional health. It can make you blackwell, tense, or depressed. Your relationships may suffer, and you may not do well at work or school.  What can you do to manage stress?  You can try these things to help manage stress:   Do something active. Exercise or activity can help reduce stress. Walking is a great way to get started. Even everyday activities such as housecleaning or yard work can help.  Try yoga or willis chi. These techniques  combine exercise and meditation. You may need some training at first to learn them.  Do something you enjoy. For example, listen to music or go to a movie. Practice your hobby or do volunteer work.  Meditate. This can help you relax, because you are not worrying about what happened before or what may happen in the future.  Do guided imagery. Imagine yourself in any setting that helps you feel calm. You can use online videos, books, or a teacher to guide you.  Do breathing exercises. For example:  From a standing position, bend forward from the waist with your knees slightly bent. Let your arms dangle close to the floor.  Breathe in slowly and deeply as you return to a standing position. Roll up slowly and lift your head last.  Hold your breath for just a few seconds in the standing position.  Breathe out slowly and bend forward from the waist.  Let your feelings out. Talk, laugh, cry, and express anger when you need to. Talking with supportive friends or family, a counselor, or a lucie leader about your feelings is a healthy way to relieve stress. Avoid discussing your feelings with people who make you feel worse.  Write. It may help to write about things that are bothering you. This helps you find out how much stress you feel and what is causing it. When you know this, you can find better ways to cope.  What can you do to prevent stress?  You might try some of these things to help prevent stress:  Manage your time. This helps you find time to do the things you want and need to do.  Get enough sleep. Your body recovers from the stresses of the day while you are sleeping.  Get support. Your family, friends, and community can make a difference in how you experience stress.  Limit your news feed. Avoid or limit time on social media or news that may make you feel stressed.  Do something active. Exercise or activity can help reduce stress. Walking is a great way to get started.  Where can you learn more?  Go to  "https://www.PlaceBlogger.net/patiented  Enter N032 in the search box to learn more about \"Learning About Stress.\"  Current as of: October 24, 2023               Content Version: 14.0    9715-2808 [employer].   Care instructions adapted under license by your healthcare professional. If you have questions about a medical condition or this instruction, always ask your healthcare professional. [employer] disclaims any warranty or liability for your use of this information.      Learning About Depression Screening  What is depression screening?  Depression screening is a way to see if you have depression symptoms. It may be done by a doctor or counselor. It's often part of a routine checkup. That's because your mental health is just as important as your physical health.  Depression is a mental health condition that affects how you feel, think, and act. You may:  Have less energy.  Lose interest in your daily activities.  Feel sad and grouchy for a long time.  Depression is very common. It affects people of all ages.  Many things can lead to depression. Some people become depressed after they have a stroke or find out they have a major illness like cancer or heart disease. The death of a loved one or a breakup may lead to depression. It can run in families. Most experts believe that a combination of inherited genes and stressful life events can cause it.  What happens during screening?  You may be asked to fill out a form about your depression symptoms. You and the doctor will discuss your answers. The doctor may ask you more questions to learn more about how you think, act, and feel.  What happens after screening?  If you have symptoms of depression, your doctor will talk to you about your options.  Doctors usually treat depression with medicines or counseling. Often, combining the two works best. Many people don't get help because they think that they'll get over the depression on their own. " "But people with depression may not get better unless they get treatment.  The cause of depression is not well understood. There may be many factors involved. But if you have depression, it's not your fault.  A serious symptom of depression is thinking about death or suicide. If you or someone you care about talks about this or about feeling hopeless, get help right away.  It's important to know that depression can be treated. Medicine, counseling, and self-care may help.  Where can you learn more?  Go to https://www.CollegeMapper.net/patiented  Enter T185 in the search box to learn more about \"Learning About Depression Screening.\"  Current as of: June 24, 2023               Content Version: 14.0    6065-4452 [employer].   Care instructions adapted under license by your healthcare professional. If you have questions about a medical condition or this instruction, always ask your healthcare professional. [employer] disclaims any warranty or liability for your use of this information.      " 9

## 2024-03-21 ENCOUNTER — TELEPHONE (OUTPATIENT)
Dept: GASTROENTEROLOGY | Facility: CLINIC | Age: 34
End: 2024-03-21
Payer: COMMERCIAL

## 2024-03-21 LAB
ALBUMIN SERPL BCG-MCNC: 4.9 G/DL (ref 3.5–5.2)
ALP SERPL-CCNC: 54 U/L (ref 40–150)
ALT SERPL W P-5'-P-CCNC: 14 U/L (ref 0–50)
ANION GAP SERPL CALCULATED.3IONS-SCNC: 11 MMOL/L (ref 7–15)
AST SERPL W P-5'-P-CCNC: 19 U/L (ref 0–45)
BILIRUB SERPL-MCNC: 0.2 MG/DL
BUN SERPL-MCNC: 13.4 MG/DL (ref 6–20)
CALCIUM SERPL-MCNC: 10.1 MG/DL (ref 8.6–10)
CHLORIDE SERPL-SCNC: 104 MMOL/L (ref 98–107)
CHOLEST SERPL-MCNC: 167 MG/DL
CREAT SERPL-MCNC: 0.86 MG/DL (ref 0.51–0.95)
DEPRECATED HCO3 PLAS-SCNC: 25 MMOL/L (ref 22–29)
EGFRCR SERPLBLD CKD-EPI 2021: 90 ML/MIN/1.73M2
FASTING STATUS PATIENT QL REPORTED: NO
GLUCOSE SERPL-MCNC: 86 MG/DL (ref 70–99)
HDLC SERPL-MCNC: 43 MG/DL
LDLC SERPL CALC-MCNC: 89 MG/DL
NONHDLC SERPL-MCNC: 124 MG/DL
POTASSIUM SERPL-SCNC: 4.8 MMOL/L (ref 3.4–5.3)
PROT SERPL-MCNC: 7.5 G/DL (ref 6.4–8.3)
SODIUM SERPL-SCNC: 140 MMOL/L (ref 135–145)
TRIGL SERPL-MCNC: 173 MG/DL
TSH SERPL DL<=0.005 MIU/L-ACNC: 2.25 UIU/ML (ref 0.3–4.2)
VIT D+METAB SERPL-MCNC: 23 NG/ML (ref 20–50)

## 2024-03-21 NOTE — TELEPHONE ENCOUNTER
[facility] Call Center    Phone Message    May a detailed message be left on voicemail: Yes    Reason for Call: Other: Patient is currently scheduled on 4/30/24, as visit type New GI Urgent. This is outside the expected timeline for this referral. Patient has been added to the waitlist.      Action Taken: Message routed to:  Other: GI REFERRAL TRIAGE POOL     Travel Screening: Not Applicable

## 2024-03-25 LAB
BKR LAB AP GYN ADEQUACY: NORMAL
BKR LAB AP GYN INTERPRETATION: NORMAL
BKR LAB AP HPV REFLEX: NORMAL
BKR LAB AP PREVIOUS ABNORMAL: NORMAL
PATH REPORT.COMMENTS IMP SPEC: NORMAL
PATH REPORT.COMMENTS IMP SPEC: NORMAL
PATH REPORT.RELEVANT HX SPEC: NORMAL

## 2024-03-25 NOTE — TELEPHONE ENCOUNTER
Writer called and left voice message for Pt. Called Pt to help get them scheduled for a sooner GI appointment. Writer left call back number.    Writer will send Pt a [employer] message.

## 2024-03-27 LAB
HUMAN PAPILLOMA VIRUS 16 DNA: NEGATIVE
HUMAN PAPILLOMA VIRUS 18 DNA: NEGATIVE
HUMAN PAPILLOMA VIRUS FINAL DIAGNOSIS: NORMAL
HUMAN PAPILLOMA VIRUS OTHER HR: NEGATIVE

## 2024-04-30 ENCOUNTER — ANCILLARY PROCEDURE (OUTPATIENT)
Dept: GENERAL RADIOLOGY | Facility: CLINIC | Age: 34
End: 2024-04-30
Attending: PHYSICIAN ASSISTANT
Payer: COMMERCIAL

## 2024-04-30 ENCOUNTER — OFFICE VISIT (OUTPATIENT)
Dept: GASTROENTEROLOGY | Facility: CLINIC | Age: 34
End: 2024-04-30
Payer: COMMERCIAL

## 2024-04-30 VITALS
SYSTOLIC BLOOD PRESSURE: 97 MMHG | WEIGHT: 210.7 LBS | DIASTOLIC BLOOD PRESSURE: 65 MMHG | HEART RATE: 76 BPM | BODY MASS INDEX: 31.57 KG/M2 | OXYGEN SATURATION: 98 %

## 2024-04-30 DIAGNOSIS — R19.7 DIARRHEA, UNSPECIFIED TYPE: Primary | ICD-10-CM

## 2024-04-30 DIAGNOSIS — R19.7 DIARRHEA, UNSPECIFIED TYPE: ICD-10-CM

## 2024-04-30 LAB — CRP SERPL-MCNC: 5.47 MG/L

## 2024-04-30 PROCEDURE — 99203 OFFICE O/P NEW LOW 30 MIN: CPT | Performed by: PHYSICIAN ASSISTANT

## 2024-04-30 PROCEDURE — 86364 TISS TRNSGLTMNASE EA IG CLAS: CPT

## 2024-04-30 PROCEDURE — 74019 RADEX ABDOMEN 2 VIEWS: CPT | Performed by: RADIOLOGY

## 2024-04-30 PROCEDURE — 36415 COLL VENOUS BLD VENIPUNCTURE: CPT

## 2024-04-30 PROCEDURE — 82784 ASSAY IGA/IGD/IGG/IGM EACH: CPT

## 2024-04-30 PROCEDURE — 86140 C-REACTIVE PROTEIN: CPT

## 2024-04-30 ASSESSMENT — PAIN SCALES - GENERAL: PAINLEVEL: NO PAIN (0)

## 2024-04-30 NOTE — PROGRESS NOTES
GI CLINIC VISIT    CC/REFERRING MD:  Jackelin Caceres    ASSESSMENT/PLAN:  33 y/o F presents for evaluation of diarrhea.     #diarrhea  Patient reports that she began to develop intermittent loose stools for the last 5 months.  She can have anywhere between 1-8 stools daily, seems to have liquid stools most days.  She reports very mild abdominal pain on occasion that is generalized in nature but does note that when she has pain stools can be more liquid in nature and she seems to have more frequency of stooling.  Differential diagnosis includes was not limited to infectious, inflammatory, malabsorptive disorder, overflow diarrhea in the setting of constipation, disorder of the gut brain axis, etc.  She does not appear to be meeting criteria for irritable bowel syndrome as she does not have abdominal pain once a week.  Will plan to obtain additional labs including a CRP and celiac serologies, she did have a TSH done by her primary care provider as well as a CBC and a CMP that were otherwise unrevealing although white blood cell count was mildly elevated.  Will also plan to obtain stool studies including infectious stool studies as well as a fecal calprotectin.  Will plan to obtain an abdominal x-ray to evaluate her stool burden.  We did have an at length discussion regarding disorders of the gut brain axis and if all of the above is unrevealing could consider nutrition and GI health psychology referral.  Will plan to have her start a soluble fiber supplementation in the meantime.  Her father does have a history of colorectal cancer and he was diagnosed at age 60, would consider starting screening at age 44.  -- Obtain labs and stool studies.  -- Obtain abdominal x-ray to evaluate stool burden.  -- Start a soluble fiber supplementation.  -- Future considerations: If labs and stool studies are otherwise unrevealing would consider nutrition and GI health psychology referral.  If fecal calprotectin is significantly  elevated would plan to obtain a colonoscopy.        C 3 months    Thank you for this consultation.  It was a pleasure to participate in the care of this patient; please contact us with any further questions.       32 minutes spent on the date of the encounter doing chart review, review of outside records, review of test results, patient visit, and documentation    This note was created with voice recognition software, and while reviewed for accuracy, typos may remain.    Denys Boo PA-C  Division of Gastroenterology, Hepatology and Nutrition  [facility]      HPI  35 y/o F presents for evaluation of diarrhea.     Patient reports that she began to have issues with diarrhea around nov/dec 2023 - she describes diarrhea as a bristol type 7. Patient will have intermittent loose stools. She will generally have a BM daily, can go anywhere from 1-8x/ day. She will have liquid stools most days. Denies BRBPR. She does endorse some fecal urgency, but denies fecal incontinence. She does endorse occasional abdominal pain, that is generalized in nature. Notices that when she is abdominal pain, stools can be looser more frequent. She notes that prior to symptoms starting, she would generally have a BM daily, that were formed in nature. Cannot identify any triggering events, no recent travel, no recent abx use. She has had issues with diarrhea intermittently throughout her life. Has struggled with constipation on occasion.  She will have occasional nausea, but this has been ongoing since she was a child.     Denies NSAIDs or Tylenol. Denies use of OTC herbal supplements/weight loss products.      Drinks alcohol once every two weeks. Denies tobacco products. No recreational drug use.     Father was diagnosed with colon cancer at age 61, grandfather in his 90's and recently diagnosed in his 90's -also has celiac disease.  Denies family history of IBD/celiac disease.     ROS:    No  fevers or chills  No weight loss  No shortness of breath or wheezing  No chest pain or pressure  No odynophagia or dysphagia  No BRBPR, hematochezia, melena  +anxiety and depression -- does see a psychiatrist, currently lexapro (has been on this for a while), buspar, and risperidone PRN for sleep, gabapentin PRN. Does not follow therapist.     PROBLEM LIST  Patient Active Problem List    Diagnosis Date Noted    Cervical cancer screening 2019     Priority: Medium     4584-9900: NIL Pap's (care everywhere)       (normal spontaneous vaginal delivery) 2019     Priority: Medium    39 weeks gestation of pregnancy 2019     Priority: Medium    Seasonal allergic rhinitis 2018     Priority: Medium    Scalp psoriasis 2018     Priority: Medium    Keratosis pilaris 2018     Priority: Medium    Mild major depression (H) 2018     Priority: Medium    Family history of colon cancer 2018     Priority: Medium    Supervision of normal pregnancy 2017     Priority: Medium       PERTINENT PAST MEDICAL HISTORY:    Past Medical History:   Diagnosis Date    Depressive disorder Age 15    Anxiety and depression has worsened since swcond birth    Family history of colon cancer 2018       PREVIOUS SURGERIES:    Past Surgical History:   Procedure Laterality Date    EYE SURGERY      LASIK       PREVIOUS ENDOSCOPY:  none    ALLERGIES:     Allergies   Allergen Reactions    Blood Transfusion Related (Informational Only) Other (See Comments)     Patient has a history of a clinically significant antibody against RBC antigens.  A delay in compatible RBCs may occur.    Septra [Sulfamethoxazole-Trimethoprim]     Sorbitan     Sulfamethoxazole-Trimethoprim Hives     As a child       PERTINENT MEDICATIONS:    Current Outpatient Medications:     albuterol (PROAIR HFA/PROVENTIL HFA/VENTOLIN HFA) 108 (90 Base) MCG/ACT inhaler, INHALE 2 PUFFS BY MOUTH AS NEEDED EVERY 4 HOURS UNTIL DIRECTED TO  STOP, Disp: , Rfl:     ammonium lactate (LAC-HYDRIN) 12 % lotion, Apply topically 2 times daily as needed for dry skin, Disp: 360 g, Rfl: 3    busPIRone HCl (BUSPAR) 30 MG tablet, Take 30 mg by mouth 2 times daily, Disp: , Rfl:     escitalopram (LEXAPRO) 20 MG tablet, TAKE 1 TABLET BY MOUTH EVERY DAY, Disp: 90 tablet, Rfl: 0    fluocinolone acetonide oil 0.01 % ear drops, Place 0.25 mLs (5 drops) in ear(s) every other day, Disp: 20 mL, Rfl: 4    loratadine (CLARITIN) 10 MG tablet, Take 20 mg by mouth daily, Disp: , Rfl:     risperiDONE (RISPERDAL) 0.25 MG tablet, TAKE 1 TABLET BY MOUTH TWICE DAILY AS NEEDED FOR ANXIETY, AGITATION, OR SLEEP, Disp: , Rfl:     SOCIAL HISTORY:    Social History     Socioeconomic History    Marital status:      Spouse name: Not on file    Number of children: 2    Years of education: Not on file    Highest education level: Not on file   Occupational History    Occupation: Nurse   Tobacco Use    Smoking status: Never    Smokeless tobacco: Never   Vaping Use    Vaping status: Never Used   Substance and Sexual Activity    Alcohol use: No     Comment: once a week    Drug use: No    Sexual activity: Yes     Partners: Male     Birth control/protection: Male Surgical     Comment: Vasectomy   Other Topics Concern    Parent/sibling w/ CABG, MI or angioplasty before 65F 55M? No   Social History Narrative    Not on file     Social Determinants of Health     Financial Resource Strain: Low Risk  (3/19/2024)    Financial Resource Strain     Within the past 12 months, have you or your family members you live with been unable to get utilities (heat, electricity) when it was really needed?: No   Food Insecurity: High Risk (3/19/2024)    Food Insecurity     Within the past 12 months, did you worry that your food would run out before you got money to buy more?: Yes     Within the past 12 months, did the food you bought just not last and you didn t have money to get more?: No   Transportation Needs:  Low Risk  (3/19/2024)    Transportation Needs     Within the past 12 months, has lack of transportation kept you from medical appointments, getting your medicines, non-medical meetings or appointments, work, or from getting things that you need?: No   Physical Activity: Insufficiently Active (3/19/2024)    Exercise Vital Sign     Days of Exercise per Week: 2 days     Minutes of Exercise per Session: 20 min   Stress: Stress Concern Present (3/19/2024)    [employer] [place] [employer] - Occupational Stress Questionnaire     Feeling of Stress : To some extent   Social Connections: Unknown (3/19/2024)    Social Connection and Isolation Panel [NHANES]     Frequency of Communication with Friends and Family: Not on file     Frequency of Social Gatherings with Friends and Family: Twice a week     Attends Judaism Services: Not on file     Active Member of Clubs or Organizations: Not on file     Attends Club or Organization Meetings: Not on file     Marital Status: Not on file   Interpersonal Safety: Low Risk  (3/20/2024)    Interpersonal Safety     Do you feel physically and emotionally safe where you currently live?: Yes     Within the past 12 months, have you been hit, slapped, kicked or otherwise physically hurt by someone?: No     Within the past 12 months, have you been humiliated or emotionally abused in other ways by your partner or ex-partner?: No   Housing Stability: Low Risk  (3/19/2024)    Housing Stability     Do you have housing? : Yes     Are you worried about losing your housing?: No       FAMILY HISTORY:  FH of CRC: father (dx'd at 61) and maternal grandfather (dx'd in his 90's)  FH of IBD: none  Family History   Problem Relation Age of Onset    Diabetes Brother     Substance Abuse Brother     Thyroid Disease Brother     Colon Cancer Father         age 62    Thyroid Disease Maternal Grandmother         Also had Alzheimer s    Prostate Cancer Maternal Grandfather         Also had colon cancer        Past/family/social history reviewed and no changes    PHYSICAL EXAMINATION:  Constitutional: aaox3, cooperative, pleasant, not dyspneic/diaphoretic, no acute distress  Vitals reviewed: BP 97/65 (BP Location: Left arm, Patient Position: Sitting, Cuff Size: Adult Large)   Pulse 76   Wt 95.6 kg (210 lb 11.2 oz)   LMP 03/03/2024 (Approximate)   SpO2 98%   BMI 31.57 kg/m    Wt:   Wt Readings from Last 2 Encounters:   03/20/24 97.5 kg (215 lb)   08/29/22 94.7 kg (208 lb 12.8 oz)      Eyes: Sclera anicteric/injected  Respiratory: Unlabored breathing  Skin: warm, perfused, no jaundice  Psych: Normal affect  MSK: Normal gait      PERTINENT STUDIES:    Office Visit on 03/20/2024   Component Date Value Ref Range Status    Interpretation 03/20/2024 Negative for Intraepithelial Lesion or Malignancy (NILM)    Final    Comment 03/20/2024    Final                    Value:This result contains rich text formatting which cannot be displayed here.    Specimen Adequacy 03/20/2024 Satisfactory for evaluation, endocervical/transformation zone component present   Final    Clinical Information 03/20/2024    Final                    Value:This result contains rich text formatting which cannot be displayed here.    Reflex Testing 03/20/2024 Yes regardless of result   Final    Previous Abnormal? 03/20/2024    Final                    Value:This result contains rich text formatting which cannot be displayed here.    Performing Labs 03/20/2024    Final                    Value:This result contains rich text formatting which cannot be displayed here.    Sodium 03/20/2024 140  135 - 145 mmol/L Final    Potassium 03/20/2024 4.8  3.4 - 5.3 mmol/L Final    Carbon Dioxide (CO2) 03/20/2024 25  22 - 29 mmol/L Final    Anion Gap 03/20/2024 11  7 - 15 mmol/L Final    Urea Nitrogen 03/20/2024 13.4  6.0 - 20.0 mg/dL Final    Creatinine 03/20/2024 0.86  0.51 - 0.95 mg/dL Final    GFR Estimate 03/20/2024 90  >60 mL/min/1.73m2 Final    Calcium  03/20/2024 10.1 (H)  8.6 - 10.0 mg/dL Final    Chloride 03/20/2024 104  98 - 107 mmol/L Final    Glucose 03/20/2024 86  70 - 99 mg/dL Final    Alkaline Phosphatase 03/20/2024 54  40 - 150 U/L Final    AST 03/20/2024 19  0 - 45 U/L Final    ALT 03/20/2024 14  0 - 50 U/L Final    Protein Total 03/20/2024 7.5  6.4 - 8.3 g/dL Final    Albumin 03/20/2024 4.9  3.5 - 5.2 g/dL Final    Bilirubin Total 03/20/2024 0.2  <=1.2 mg/dL Final    Hemoglobin A1C 03/20/2024 5.3  0.0 - 5.6 % Final    Cholesterol 03/20/2024 167  <200 mg/dL Final    Triglycerides 03/20/2024 173 (H)  <150 mg/dL Final    Direct Measure HDL 03/20/2024 43 (L)  >=50 mg/dL Final    LDL Cholesterol Calculated 03/20/2024 89  <=100 mg/dL Final    Non HDL Cholesterol 03/20/2024 124  <130 mg/dL Final    Patient Fasting > 8hrs? 03/20/2024 No   Final    Vitamin D, Total (25-Hydroxy) 03/20/2024 23  20 - 50 ng/mL Final    TSH 03/20/2024 2.25  0.30 - 4.20 uIU/mL Final    WBC Count 03/20/2024 13.4 (H)  4.0 - 11.0 10e3/uL Final    RBC Count 03/20/2024 4.78  3.80 - 5.20 10e6/uL Final    Hemoglobin 03/20/2024 13.9  11.7 - 15.7 g/dL Final    Hematocrit 03/20/2024 42.0  35.0 - 47.0 % Final    MCV 03/20/2024 88  78 - 100 fL Final    MCH 03/20/2024 29.1  26.5 - 33.0 pg Final    MCHC 03/20/2024 33.1  31.5 - 36.5 g/dL Final    RDW 03/20/2024 12.9  10.0 - 15.0 % Final    Platelet Count 03/20/2024 362  150 - 450 10e3/uL Final    % Neutrophils 03/20/2024 71  % Final    % Lymphocytes 03/20/2024 20  % Final    % Monocytes 03/20/2024 6  % Final    % Eosinophils 03/20/2024 3  % Final    % Basophils 03/20/2024 0  % Final    % Immature Granulocytes 03/20/2024 0  % Final    Absolute Neutrophils 03/20/2024 9.5 (H)  1.6 - 8.3 10e3/uL Final    Absolute Lymphocytes 03/20/2024 2.7  0.8 - 5.3 10e3/uL Final    Absolute Monocytes 03/20/2024 0.8  0.0 - 1.3 10e3/uL Final    Absolute Eosinophils 03/20/2024 0.4  0.0 - 0.7 10e3/uL Final    Absolute Basophils 03/20/2024 0.0  0.0 - 0.2 10e3/uL Final     Absolute Immature Granulocytes 03/20/2024 0.0  <=0.4 10e3/uL Final    Other HR HPV 03/20/2024 Negative  Negative Final    HPV16 DNA 03/20/2024 Negative  Negative Final    HPV18 DNA 03/20/2024 Negative  Negative Final    FINAL DIAGNOSIS 03/20/2024    Final                    Value:This result contains rich text formatting which cannot be displayed here.

## 2024-04-30 NOTE — LETTER
4/30/2024         RE: Sophie Felton  [address] [facility] [address]        Dear Colleague,    Thank you for referring your patient, Sophie Felton, to the [facility]. Please see a copy of my visit note below.    GI CLINIC VISIT    CC/REFERRING MD:  Jackelin Caceres    ASSESSMENT/PLAN:  33 y/o F presents for evaluation of diarrhea.     #diarrhea  Patient reports that she began to develop intermittent loose stools for the last 5 months.  She can have anywhere between 1-8 stools daily, seems to have liquid stools most days.  She reports very mild abdominal pain on occasion that is generalized in nature but does note that when she has pain stools can be more liquid in nature and she seems to have more frequency of stooling.  Differential diagnosis includes was not limited to infectious, inflammatory, malabsorptive disorder, overflow diarrhea in the setting of constipation, disorder of the gut brain axis, etc.  She does not appear to be meeting criteria for irritable bowel syndrome as she does not have abdominal pain once a week.  Will plan to obtain additional labs including a CRP and celiac serologies, she did have a TSH done by her primary care provider as well as a CBC and a CMP that were otherwise unrevealing although white blood cell count was mildly elevated.  Will also plan to obtain stool studies including infectious stool studies as well as a fecal calprotectin.  Will plan to obtain an abdominal x-ray to evaluate her stool burden.  We did have an at length discussion regarding disorders of the gut brain axis and if all of the above is unrevealing could consider nutrition and GI health psychology referral.  Will plan to have her start a soluble fiber supplementation in the meantime.  Her father does have a history of colorectal cancer and he was diagnosed at age 60, would consider starting screening at age 44.  -- Obtain labs and stool studies.  -- Obtain  abdominal x-ray to evaluate stool burden.  -- Start a soluble fiber supplementation.  -- Future considerations: If labs and stool studies are otherwise unrevealing would consider nutrition and GI health psychology referral.  If fecal calprotectin is significantly elevated would plan to obtain a colonoscopy.        RTC 3 months    Thank you for this consultation.  It was a pleasure to participate in the care of this patient; please contact us with any further questions.       32 minutes spent on the date of the encounter doing chart review, review of outside records, review of test results, patient visit, and documentation    This note was created with voice recognition software, and while reviewed for accuracy, typos may remain.    Denys Boo PA-C  Division of Gastroenterology, Hepatology and Nutrition  [facility]  35 y/o F presents for evaluation of diarrhea.     Patient reports that she began to have issues with diarrhea around nov/dec 2023 - she describes diarrhea as a bristol type 7. Patient will have intermittent loose stools. She will generally have a BM daily, can go anywhere from 1-8x/ day. She will have liquid stools most days. Denies BRBPR. She does endorse some fecal urgency, but denies fecal incontinence. She does endorse occasional abdominal pain, that is generalized in nature. Notices that when she is abdominal pain, stools can be looser more frequent. She notes that prior to symptoms starting, she would generally have a BM daily, that were formed in nature. Cannot identify any triggering events, no recent travel, no recent abx use. She has had issues with diarrhea intermittently throughout her life. Has struggled with constipation on occasion.  She will have occasional nausea, but this has been ongoing since she was a child.     Denies NSAIDs or Tylenol. Denies use of OTC herbal supplements/weight loss products.      Drinks alcohol once every  two weeks. Denies tobacco products. No recreational drug use.     Father was diagnosed with colon cancer at age 61, grandfather in his 90's and recently diagnosed in his 90's -also has celiac disease.  Denies family history of IBD/celiac disease.     ROS:    No fevers or chills  No weight loss  No shortness of breath or wheezing  No chest pain or pressure  No odynophagia or dysphagia  No BRBPR, hematochezia, melena  +anxiety and depression -- does see a psychiatrist, currently lexapro (has been on this for a while), buspar, and risperidone PRN for sleep, gabapentin PRN. Does not follow therapist.     PROBLEM LIST  Patient Active Problem List    Diagnosis Date Noted     Cervical cancer screening 2019     Priority: Medium     3389-5326: NIL Pap's (care everywhere)        (normal spontaneous vaginal delivery) 2019     Priority: Medium     39 weeks gestation of pregnancy 2019     Priority: Medium     Seasonal allergic rhinitis 2018     Priority: Medium     Scalp psoriasis 2018     Priority: Medium     Keratosis pilaris 2018     Priority: Medium     Mild major depression (H) 2018     Priority: Medium     Family history of colon cancer 2018     Priority: Medium     Supervision of normal pregnancy 2017     Priority: Medium       PERTINENT PAST MEDICAL HISTORY:    Past Medical History:   Diagnosis Date     Depressive disorder Age 15    Anxiety and depression has worsened since swcond birth     Family history of colon cancer 2018       PREVIOUS SURGERIES:    Past Surgical History:   Procedure Laterality Date     EYE SURGERY      LASIK       PREVIOUS ENDOSCOPY:  none    ALLERGIES:     Allergies   Allergen Reactions     Blood Transfusion Related (Informational Only) Other (See Comments)     Patient has a history of a clinically significant antibody against RBC antigens.  A delay in compatible RBCs may occur.     Septra [Sulfamethoxazole-Trimethoprim]       Sorbitan      Sulfamethoxazole-Trimethoprim Hives     As a child       PERTINENT MEDICATIONS:    Current Outpatient Medications:      albuterol (PROAIR HFA/PROVENTIL HFA/VENTOLIN HFA) 108 (90 Base) MCG/ACT inhaler, INHALE 2 PUFFS BY MOUTH AS NEEDED EVERY 4 HOURS UNTIL DIRECTED TO STOP, Disp: , Rfl:      ammonium lactate (LAC-HYDRIN) 12 % lotion, Apply topically 2 times daily as needed for dry skin, Disp: 360 g, Rfl: 3     busPIRone HCl (BUSPAR) 30 MG tablet, Take 30 mg by mouth 2 times daily, Disp: , Rfl:      escitalopram (LEXAPRO) 20 MG tablet, TAKE 1 TABLET BY MOUTH EVERY DAY, Disp: 90 tablet, Rfl: 0     fluocinolone acetonide oil 0.01 % ear drops, Place 0.25 mLs (5 drops) in ear(s) every other day, Disp: 20 mL, Rfl: 4     loratadine (CLARITIN) 10 MG tablet, Take 20 mg by mouth daily, Disp: , Rfl:      risperiDONE (RISPERDAL) 0.25 MG tablet, TAKE 1 TABLET BY MOUTH TWICE DAILY AS NEEDED FOR ANXIETY, AGITATION, OR SLEEP, Disp: , Rfl:     SOCIAL HISTORY:    Social History     Socioeconomic History     Marital status:      Spouse name: Not on file     Number of children: 2     Years of education: Not on file     Highest education level: Not on file   Occupational History     Occupation: Nurse   Tobacco Use     Smoking status: Never     Smokeless tobacco: Never   Vaping Use     Vaping status: Never Used   Substance and Sexual Activity     Alcohol use: No     Comment: once a week     Drug use: No     Sexual activity: Yes     Partners: Male     Birth control/protection: Male Surgical     Comment: Vasectomy   Other Topics Concern     Parent/sibling w/ CABG, MI or angioplasty before 65F 55M? No   Social History Narrative     Not on file     Social Determinants of Health     Financial Resource Strain: Low Risk  (3/19/2024)    Financial Resource Strain      Within the past 12 months, have you or your family members you live with been unable to get utilities (heat, electricity) when it was really needed?: No   Food  Insecurity: High Risk (3/19/2024)    Food Insecurity      Within the past 12 months, did you worry that your food would run out before you got money to buy more?: Yes      Within the past 12 months, did the food you bought just not last and you didn t have money to get more?: No   Transportation Needs: Low Risk  (3/19/2024)    Transportation Needs      Within the past 12 months, has lack of transportation kept you from medical appointments, getting your medicines, non-medical meetings or appointments, work, or from getting things that you need?: No   Physical Activity: Insufficiently Active (3/19/2024)    Exercise Vital Sign      Days of Exercise per Week: 2 days      Minutes of Exercise per Session: 20 min   Stress: Stress Concern Present (3/19/2024)    [employer] - Occupational Stress Questionnaire      Feeling of Stress : To some extent   Social Connections: Unknown (3/19/2024)    Social Connection and Isolation Panel [NHANES]      Frequency of Communication with Friends and Family: Not on file      Frequency of Social Gatherings with Friends and Family: Twice a week      Attends Evangelical Services: Not on file      Active Member of Clubs or Organizations: Not on file      Attends Club or Organization Meetings: Not on file      Marital Status: Not on file   Interpersonal Safety: Low Risk  (3/20/2024)    Interpersonal Safety      Do you feel physically and emotionally safe where you currently live?: Yes      Within the past 12 months, have you been hit, slapped, kicked or otherwise physically hurt by someone?: No      Within the past 12 months, have you been humiliated or emotionally abused in other ways by your partner or ex-partner?: No   Housing Stability: Low Risk  (3/19/2024)    Housing Stability      Do you have housing? : Yes      Are you worried about losing your housing?: No       FAMILY HISTORY:  FH of CRC: father (dx'd at 61) and maternal grandfather (dx'd in his 90's)  FH  of IBD: none  Family History   Problem Relation Age of Onset     Diabetes Brother      Substance Abuse Brother      Thyroid Disease Brother      Colon Cancer Father         age 62     Thyroid Disease Maternal Grandmother         Also had Alzheimer s     Prostate Cancer Maternal Grandfather         Also had colon cancer       Past/family/social history reviewed and no changes    PHYSICAL EXAMINATION:  Constitutional: aaox3, cooperative, pleasant, not dyspneic/diaphoretic, no acute distress  Vitals reviewed: BP 97/65 (BP Location: Left arm, Patient Position: Sitting, Cuff Size: Adult Large)   Pulse 76   Wt 95.6 kg (210 lb 11.2 oz)   LMP 03/03/2024 (Approximate)   SpO2 98%   BMI 31.57 kg/m    Wt:   Wt Readings from Last 2 Encounters:   03/20/24 97.5 kg (215 lb)   08/29/22 94.7 kg (208 lb 12.8 oz)      Eyes: Sclera anicteric/injected  Respiratory: Unlabored breathing  Skin: warm, perfused, no jaundice  Psych: Normal affect  MSK: Normal gait      PERTINENT STUDIES:    Office Visit on 03/20/2024   Component Date Value Ref Range Status     Interpretation 03/20/2024 Negative for Intraepithelial Lesion or Malignancy (NILM)    Final     Comment 03/20/2024    Final                    Value:This result contains rich text formatting which cannot be displayed here.     Specimen Adequacy 03/20/2024 Satisfactory for evaluation, endocervical/transformation zone component present   Final     Clinical Information 03/20/2024    Final                    Value:This result contains rich text formatting which cannot be displayed here.     Reflex Testing 03/20/2024 Yes regardless of result   Final     Previous Abnormal? 03/20/2024    Final                    Value:This result contains rich text formatting which cannot be displayed here.     Performing Labs 03/20/2024    Final                    Value:This result contains rich text formatting which cannot be displayed here.     Sodium 03/20/2024 140  135 - 145 mmol/L Final     Potassium  03/20/2024 4.8  3.4 - 5.3 mmol/L Final     Carbon Dioxide (CO2) 03/20/2024 25  22 - 29 mmol/L Final     Anion Gap 03/20/2024 11  7 - 15 mmol/L Final     Urea Nitrogen 03/20/2024 13.4  6.0 - 20.0 mg/dL Final     Creatinine 03/20/2024 0.86  0.51 - 0.95 mg/dL Final     GFR Estimate 03/20/2024 90  >60 mL/min/1.73m2 Final     Calcium 03/20/2024 10.1 (H)  8.6 - 10.0 mg/dL Final     Chloride 03/20/2024 104  98 - 107 mmol/L Final     Glucose 03/20/2024 86  70 - 99 mg/dL Final     Alkaline Phosphatase 03/20/2024 54  40 - 150 U/L Final     AST 03/20/2024 19  0 - 45 U/L Final     ALT 03/20/2024 14  0 - 50 U/L Final     Protein Total 03/20/2024 7.5  6.4 - 8.3 g/dL Final     Albumin 03/20/2024 4.9  3.5 - 5.2 g/dL Final     Bilirubin Total 03/20/2024 0.2  <=1.2 mg/dL Final     Hemoglobin A1C 03/20/2024 5.3  0.0 - 5.6 % Final     Cholesterol 03/20/2024 167  <200 mg/dL Final     Triglycerides 03/20/2024 173 (H)  <150 mg/dL Final     Direct Measure HDL 03/20/2024 43 (L)  >=50 mg/dL Final     LDL Cholesterol Calculated 03/20/2024 89  <=100 mg/dL Final     Non HDL Cholesterol 03/20/2024 124  <130 mg/dL Final     Patient Fasting > 8hrs? 03/20/2024 No   Final     Vitamin D, Total (25-Hydroxy) 03/20/2024 23  20 - 50 ng/mL Final     TSH 03/20/2024 2.25  0.30 - 4.20 uIU/mL Final     WBC Count 03/20/2024 13.4 (H)  4.0 - 11.0 10e3/uL Final     RBC Count 03/20/2024 4.78  3.80 - 5.20 10e6/uL Final     Hemoglobin 03/20/2024 13.9  11.7 - 15.7 g/dL Final     Hematocrit 03/20/2024 42.0  35.0 - 47.0 % Final     MCV 03/20/2024 88  78 - 100 fL Final     MCH 03/20/2024 29.1  26.5 - 33.0 pg Final     MCHC 03/20/2024 33.1  31.5 - 36.5 g/dL Final     RDW 03/20/2024 12.9  10.0 - 15.0 % Final     Platelet Count 03/20/2024 362  150 - 450 10e3/uL Final     % Neutrophils 03/20/2024 71  % Final     % Lymphocytes 03/20/2024 20  % Final     % Monocytes 03/20/2024 6  % Final     % Eosinophils 03/20/2024 3  % Final     % Basophils 03/20/2024 0  % Final     %  Immature Granulocytes 03/20/2024 0  % Final     Absolute Neutrophils 03/20/2024 9.5 (H)  1.6 - 8.3 10e3/uL Final     Absolute Lymphocytes 03/20/2024 2.7  0.8 - 5.3 10e3/uL Final     Absolute Monocytes 03/20/2024 0.8  0.0 - 1.3 10e3/uL Final     Absolute Eosinophils 03/20/2024 0.4  0.0 - 0.7 10e3/uL Final     Absolute Basophils 03/20/2024 0.0  0.0 - 0.2 10e3/uL Final     Absolute Immature Granulocytes 03/20/2024 0.0  <=0.4 10e3/uL Final     Other HR HPV 03/20/2024 Negative  Negative Final     HPV16 DNA 03/20/2024 Negative  Negative Final     HPV18 DNA 03/20/2024 Negative  Negative Final     FINAL DIAGNOSIS 03/20/2024    Final                    Value:This result contains rich text formatting which cannot be displayed here.       Again, thank you for allowing me to participate in the care of your patient.        Sincerely,        Denys Boo PA-C

## 2024-04-30 NOTE — PATIENT INSTRUCTIONS
It was a pleasure taking care of you today.  I've included a brief summary of our discussion and care plan from today's visit below.  Please review this information with your primary care provider.  ______________________________________________________________________    My recommendations are summarized as follows:  --please obtain labs and stool studies.   --please obtain abdominal x-ray so we can evaluate your stool burden.  ---- Recommend starting supplementation with a powdered soluble fiber. When used on a daily basis, this can help regulate the consistency of your stools. Metamucil (psyllium) and Citrucel are preferred examples. You can start with 1-2 teaspoons per day, with goal to gradually increase to 1 tablespoon daily. You can increase up to 1 tablespoon three times daily if needed. It is important to stay well-hydrated with use of fiber supplementation and to make sure that the fiber powder is well mixed with water as directed on the label. You may experience some bloating with initiation of fiber, which will improve over the first few weeks. A good trial to evaluate the effect is 3-6 months. Of note, many of the fiber products contain artificial sweeteners, which can cause bloating, gas, and diarrhea in those who may be sensitive to artificial sweeteners. If this is the case, recommend trying Metamucil Premium Blend (with Stevia), Metamucil 4-in-1 without Added Sweeteners, or Bellway (sweetened with Monk fruit extract).    Insoluble fiber acts like a bulky  inner broom,  sweeping out debris from the intestine and creating more motility and movement.       Soluble fiber attracts water and swells, creating a gel that slows digestion.  Also, slows the release of glucose from foods into the blood which stops spikes in blood sugar levels.  Soluble fiber traps toxins in the gut, helping to carry them to excretion and provides healthy bacteria in the digestive tract.       -- please see scheduling  information provided below     Return to GI Clinic in 3 months to review your progress.    ______________________________________________________________________    How do I schedule labs, imaging studies, or procedures that were ordered in clinic today?     Labs: To schedule lab appointment at the [facility], use my chart or call [phone]. If you have a [facility] lab closer to home where you are regularly seen you can give them a call.     Procedures: If a colonoscopy, upper endoscopy, breath test, esophageal manometry, or pH impedence was ordered today, our endoscopy team will call you to schedule this. If you have not heard from our endoscopy team within a week, please call [phone] to schedule.     Imaging Studies: If you were scheduled for a CT scan, X-ray, MRI, ultrasound, HIDA scan or other imaging study, please call [phone] to have this scheduled.     Referral: If a referral to another specialty was ordered, expect a phone call or follow instructions above. If you have not heard from anyone regarding your referral in a week, please call our clinic to check the status.     Who do I call with any questions after my visit?  Please be in touch if there are any further questions that arise following today's visit.  There are multiple ways to contact your gastroenterology care team.      During business hours, you may reach a Gastroenterology nurse at [phone]    To schedule or reschedule an appointment, please call [phone].     You can always send a secure message through HOSTING.  HOSTING messages are answered by your nurse or doctor typically within 24 hours.  Please allow extra time on weekends and holidays.      For urgent/emergent questions after business hours, you may reach the on-call GI Fellow by contacting the [facility]  at [phone].     How will I get the results of any tests ordered?    You will receive  all of your results.  If you have signed up for [employer], any tests ordered at your visit will be available to you after your provider reviews them.  Typically this takes 1-2 weeks.  If there are urgent results that require a change in your care plan, your provider or nurse will call you to discuss the next steps.      What is [employer]ostellar[employer]?  [employer] is a secure way for you to access all of your healthcare records from the [facility].  It is a web based computer program, so you can sign on to it from any location.  It also allows you to send secure messages to your care team.  I recommend signing up for [employer]ostellarhart access if you have not already done so and are comfortable with using a computer.      How to I schedule a follow-up visit?  If you did not schedule a follow-up visit today, please call [phone] to schedule a follow-up office visit.      Sincerely,    Denys Boo PA-C  Division of Gastroenterology, Hepatology & Nutrition  [facility]

## 2024-05-01 LAB
ADV 40+41 DNA STL QL NAA+NON-PROBE: NEGATIVE
ASTRO TYP 1-8 RNA STL QL NAA+NON-PROBE: NEGATIVE
C CAYETANENSIS DNA STL QL NAA+NON-PROBE: NEGATIVE
C DIFF TOX B STL QL: NEGATIVE
CAMPYLOBACTER DNA SPEC NAA+PROBE: NEGATIVE
CRYPTOSP DNA STL QL NAA+NON-PROBE: NEGATIVE
E COLI O157 DNA STL QL NAA+NON-PROBE: NORMAL
E HISTOLYT DNA STL QL NAA+NON-PROBE: NEGATIVE
EAEC ASTA GENE ISLT QL NAA+PROBE: NEGATIVE
EC STX1+STX2 GENES STL QL NAA+NON-PROBE: NEGATIVE
EPEC EAE GENE STL QL NAA+NON-PROBE: NEGATIVE
ETEC LTA+ST1A+ST1B TOX ST NAA+NON-PROBE: NEGATIVE
G LAMBLIA DNA STL QL NAA+NON-PROBE: NEGATIVE
IGA SERPL-MCNC: 110 MG/DL (ref 84–499)
NOROVIRUS GI+II RNA STL QL NAA+NON-PROBE: NEGATIVE
P SHIGELLOIDES DNA STL QL NAA+NON-PROBE: NEGATIVE
RVA RNA STL QL NAA+NON-PROBE: NEGATIVE
SALMONELLA SP RPOD STL QL NAA+PROBE: NEGATIVE
SAPO I+II+IV+V RNA STL QL NAA+NON-PROBE: NEGATIVE
SHIGELLA SP+EIEC IPAH ST NAA+NON-PROBE: NEGATIVE
V CHOLERAE DNA SPEC QL NAA+PROBE: NEGATIVE
VIBRIO DNA SPEC NAA+PROBE: NEGATIVE
Y ENTEROCOL DNA STL QL NAA+PROBE: NEGATIVE

## 2024-05-01 PROCEDURE — 83993 ASSAY FOR CALPROTECTIN FECAL: CPT

## 2024-05-01 PROCEDURE — 87507 IADNA-DNA/RNA PROBE TQ 12-25: CPT

## 2024-05-03 LAB — CALPROTECTIN STL-MCNT: 11.8 MG/KG (ref 0–49.9)

## 2024-05-05 LAB
TTG IGA SER-ACNC: 0.3 U/ML
TTG IGG SER-ACNC: <0.6 U/ML

## 2024-07-26 ENCOUNTER — TELEPHONE (OUTPATIENT)
Dept: GASTROENTEROLOGY | Facility: CLINIC | Age: 34
End: 2024-07-26
Payer: COMMERCIAL

## 2024-07-26 NOTE — TELEPHONE ENCOUNTER
Patient was called to remind of upcoming appointment scheduled on Tuesday July 30, 2024 at 2:30 PM with Fabián Hurtado PA-C in the GI clinic located in [place]. Patient did not answer the phone. Left VM with appointment details. Instructed patient to arrive 15 minutes early for check in and informed patient that we're located on the second floor. Provided contact information in case rescheduling is necessary.    Kaylee Contreras, CMA

## 2024-07-30 ENCOUNTER — MEDICAL CORRESPONDENCE (OUTPATIENT)
Dept: HEALTH INFORMATION MANAGEMENT | Facility: CLINIC | Age: 34
End: 2024-07-30

## 2024-07-30 ENCOUNTER — OFFICE VISIT (OUTPATIENT)
Dept: GASTROENTEROLOGY | Facility: CLINIC | Age: 34
End: 2024-07-30
Attending: PHYSICIAN ASSISTANT
Payer: COMMERCIAL

## 2024-07-30 ENCOUNTER — DOCUMENTATION ONLY (OUTPATIENT)
Dept: GASTROENTEROLOGY | Facility: CLINIC | Age: 34
End: 2024-07-30

## 2024-07-30 VITALS
BODY MASS INDEX: 31.84 KG/M2 | HEART RATE: 68 BPM | SYSTOLIC BLOOD PRESSURE: 105 MMHG | OXYGEN SATURATION: 96 % | DIASTOLIC BLOOD PRESSURE: 70 MMHG | HEIGHT: 69 IN | WEIGHT: 215 LBS

## 2024-07-30 DIAGNOSIS — K52.9 CHRONIC DIARRHEA: Primary | ICD-10-CM

## 2024-07-30 DIAGNOSIS — R15.9 INCONTINENCE OF FECES, UNSPECIFIED FECAL INCONTINENCE TYPE: ICD-10-CM

## 2024-07-30 DIAGNOSIS — K92.1 HEMATOCHEZIA: ICD-10-CM

## 2024-07-30 PROCEDURE — 99214 OFFICE O/P EST MOD 30 MIN: CPT | Performed by: PHYSICIAN ASSISTANT

## 2024-07-30 ASSESSMENT — PAIN SCALES - GENERAL: PAINLEVEL: NO PAIN (0)

## 2024-07-30 NOTE — PROGRESS NOTES
FOLLOW UP GI CLINIC VISIT    CC/REFERRING MD:  Denys Boo  REASON FOR CONSULTATION:   Denys Boo for   Chief Complaint   Patient presents with    Follow Up     3 month follow up for diarrhea.  Last seen per Denys Boo PA-C        ASSESSMENT/PLAN: Patient here for follow-up of several months of recurring diarrhea, intermittent hematochezia, and fecal incontinence.  Mildly elevated CRP but otherwise negative fecal calprotectin, making IBD less likely.  With recurrent hematochezia, I do think colonoscopy is warranted here to evaluate source of bleeding.  Would also allow us to definitively rule out inflammatory diarrhea.  Regarding the fecal incontinence, I suspect pelvic floor dysfunction may be contributing.  Supplemental fiber has been mildly helpful.  Will refer to the pelvic floor center for testing and treatment.  I recommended she continue with supplemental fiber for now.    1. Chronic diarrhea  - Adult GI  Referral - Procedure Only; Future    2. Hematochezia  - Adult GI  Referral - Procedure Only; Future    3. Incontinence of feces, unspecified fecal incontinence type  - Adult GI  Referral - Procedure Only; Future        RTC 4 months    Thank you for this consultation.  It was a pleasure to participate in the care of this patient; please contact us with any further questions.      25 minutes spent on the date of the encounter doing chart review, patient visit, and documentation    This note was created with voice recognition software, and while reviewed for accuracy, typos may remain.     Fabián Hurtado PA-C  Division of Gastroenterology, Hepatology and Nutrition  [facility]  Sophie Felton is a 34 year old female that is seen for follow up in the GI clinic today for follow-up.  She initially met with my partner, Denys Boo PA-C, about 3 months ago for evaluation of frequent loose stool for the past several months.  She described  having occasional abdominal discomfort and bloating, but this was not a frequent symptom.  Her initial workup included laboratory studies to rule out infectious and inflammatory diarrhea.  Her CRP level came back very mildly elevated but fecal calprotectin were normal, infectious stool tests were normal, and celiac serologies were normal as well.  Abdominal x-ray did not show any evidence of obstruction or significant stool burden.  She was advised to start soluble fiber supplementation.  She has found this mildly helpful, but continues to have frequent loose stools.  She also mentions that she has had intermittent hematochezia, described as darker blood appearing on the toilet paper and the surface of the stool.  There is also been episodes of fecal incontinence with coughing or sneezing.    ROS:    10 point ROS neg other than the symptoms noted above in the HPI.    PREVIOUS ENDOSCOPY:  None    PERTINENT RELEVANT IMAGING OR LABS:  Reviewed    ALLERGIES:     Allergies   Allergen Reactions    Blood Transfusion Related (Informational Only) Other (See Comments)     Patient has a history of a clinically significant antibody against RBC antigens.  A delay in compatible RBCs may occur.    Septra [Sulfamethoxazole-Trimethoprim]     Sorbitan     Sulfamethoxazole-Trimethoprim Hives     As a child       PERTINENT MEDICATIONS:    Current Outpatient Medications:     albuterol (PROAIR HFA/PROVENTIL HFA/VENTOLIN HFA) 108 (90 Base) MCG/ACT inhaler, INHALE 2 PUFFS BY MOUTH AS NEEDED EVERY 4 HOURS UNTIL DIRECTED TO STOP, Disp: , Rfl:     ammonium lactate (LAC-HYDRIN) 12 % lotion, Apply topically 2 times daily as needed for dry skin, Disp: 360 g, Rfl: 3    busPIRone HCl (BUSPAR) 30 MG tablet, Take 30 mg by mouth 2 times daily, Disp: , Rfl:     escitalopram (LEXAPRO) 20 MG tablet, TAKE 1 TABLET BY MOUTH EVERY DAY, Disp: 90 tablet, Rfl: 0    fluocinolone acetonide oil 0.01 % ear drops, Place 0.25 mLs (5 drops) in ear(s) every other  day, Disp: 20 mL, Rfl: 4    loratadine (CLARITIN) 10 MG tablet, Take 20 mg by mouth daily, Disp: , Rfl:     risperiDONE (RISPERDAL) 0.25 MG tablet, TAKE 1 TABLET BY MOUTH TWICE DAILY AS NEEDED FOR ANXIETY, AGITATION, OR SLEEP, Disp: , Rfl:     PROBLEM LIST  Patient Active Problem List    Diagnosis Date Noted    Cervical cancer screening 2019     Priority: Medium     3410-2532: NIL Pap's (care everywhere)       (normal spontaneous vaginal delivery) 2019     Priority: Medium    39 weeks gestation of pregnancy 2019     Priority: Medium    Seasonal allergic rhinitis 2018     Priority: Medium    Scalp psoriasis 2018     Priority: Medium    Keratosis pilaris 2018     Priority: Medium    Mild major depression (H) 2018     Priority: Medium    Family history of colon cancer 2018     Priority: Medium    Supervision of normal pregnancy 2017     Priority: Medium       PERTINENT PAST MEDICAL HISTORY:  Past Medical History:   Diagnosis Date    Depressive disorder Age 15    Anxiety and depression has worsened since swcond birth    Family history of colon cancer 2018       PREVIOUS SURGERIES:  Past Surgical History:   Procedure Laterality Date    EYE SURGERY      LASIK       SOCIAL HISTORY:  Social History     Socioeconomic History    Marital status:      Spouse name: Not on file    Number of children: 2    Years of education: Not on file    Highest education level: Not on file   Occupational History    Occupation: Nurse   Tobacco Use    Smoking status: Never    Smokeless tobacco: Never   Vaping Use    Vaping status: Never Used   Substance and Sexual Activity    Alcohol use: No     Comment: once a week    Drug use: No    Sexual activity: Yes     Partners: Male     Birth control/protection: Male Surgical     Comment: Vasectomy   Other Topics Concern    Parent/sibling w/ CABG, MI or angioplasty before 65F 55M? No   Social History Narrative    Not on file      Social Determinants of Health     Financial Resource Strain: Low Risk  (3/19/2024)    Financial Resource Strain     Within the past 12 months, have you or your family members you live with been unable to get utilities (heat, electricity) when it was really needed?: No   Food Insecurity: High Risk (3/19/2024)    Food Insecurity     Within the past 12 months, did you worry that your food would run out before you got money to buy more?: Yes     Within the past 12 months, did the food you bought just not last and you didn t have money to get more?: No   Transportation Needs: Low Risk  (3/19/2024)    Transportation Needs     Within the past 12 months, has lack of transportation kept you from medical appointments, getting your medicines, non-medical meetings or appointments, work, or from getting things that you need?: No   Physical Activity: Insufficiently Active (3/19/2024)    Exercise Vital Sign     Days of Exercise per Week: 2 days     Minutes of Exercise per Session: 20 min   Stress: Stress Concern Present (3/19/2024)    [employer] [place] [employer] - Occupational Stress Questionnaire     Feeling of Stress : To some extent   Social Connections: Unknown (3/19/2024)    Social Connection and Isolation Panel [NHANES]     Frequency of Communication with Friends and Family: Not on file     Frequency of Social Gatherings with Friends and Family: Twice a week     Attends Mormonism Services: Not on file     Active Member of Clubs or Organizations: Not on file     Attends Club or Organization Meetings: Not on file     Marital Status: Not on file   Interpersonal Safety: Low Risk  (3/20/2024)    Interpersonal Safety     Do you feel physically and emotionally safe where you currently live?: Yes     Within the past 12 months, have you been hit, slapped, kicked or otherwise physically hurt by someone?: No     Within the past 12 months, have you been humiliated or emotionally abused in other ways by your partner or  "ex-partner?: No   Housing Stability: Low Risk  (3/19/2024)    Housing Stability     Do you have housing? : Yes     Are you worried about losing your housing?: No       FAMILY HISTORY:  Family History   Problem Relation Age of Onset    Diabetes Brother     Substance Abuse Brother     Thyroid Disease Brother     Colon Cancer Father         age 62    Thyroid Disease Maternal Grandmother         Also had Alzheimer s    Prostate Cancer Maternal Grandfather         Also had colon cancer       Past/family/social history reviewed and no changes    PHYSICAL EXAMINATION:  Constitutional: aaox3, cooperative, pleasant, not dyspneic/diaphoretic, no acute distress  Vitals reviewed: /70 (BP Location: Left arm, Patient Position: Sitting, Cuff Size: Adult Regular)   Pulse 68   Ht 1.74 m (5' 8.5\")   Wt 97.5 kg (215 lb)   SpO2 96%   BMI 32.22 kg/m    Wt:   Wt Readings from Last 2 Encounters:   07/30/24 97.5 kg (215 lb)   04/30/24 95.6 kg (210 lb 11.2 oz)      Eyes: Sclera anicteric/injected  CV: No edema  Respiratory: Unlabored breathing  Skin: warm, perfused, no jaundice  Psych: Normal affect  MSK: Normal gait                    "

## 2024-07-30 NOTE — LETTER
7/30/2024      Sophie Felton  [address] [place] [address]      Dear Colleague,    Thank you for referring your patient, Sophie Felton, to the [facility]. Please see a copy of my visit note below.    FOLLOW UP GI CLINIC VISIT    CC/REFERRING MD:  Denys Boo  REASON FOR CONSULTATION:   Denys Boo for   Chief Complaint   Patient presents with     Follow Up     3 month follow up for diarrhea.  Last seen per Denys Boo PA-C        ASSESSMENT/PLAN: Patient here for follow-up of several months of recurring diarrhea, intermittent hematochezia, and fecal incontinence.  Mildly elevated CRP but otherwise negative fecal calprotectin, making IBD less likely.  With recurrent hematochezia, I do think colonoscopy is warranted here to evaluate source of bleeding.  Would also allow us to definitively rule out inflammatory diarrhea.  Regarding the fecal incontinence, I suspect pelvic floor dysfunction may be contributing.  Supplemental fiber has been mildly helpful.  Will refer to the pelvic floor center for testing and treatment.  I recommended she continue with supplemental fiber for now.    1. Chronic diarrhea  - Adult GI  Referral - Procedure Only; Future    2. Hematochezia  - Adult GI  Referral - Procedure Only; Future    3. Incontinence of feces, unspecified fecal incontinence type  - Adult GI  Referral - Procedure Only; Future        RTC 4 months    Thank you for this consultation.  It was a pleasure to participate in the care of this patient; please contact us with any further questions.      25 minutes spent on the date of the encounter doing chart review, patient visit, and documentation    This note was created with voice recognition software, and while reviewed for accuracy, typos may remain.     Fabián Hurtado PA-C  Division of Gastroenterology, Hepatology and Nutrition  [facility]      ROSY Barrios  Jose Francisco is a 34 year old female that is seen for follow up in the GI clinic today for follow-up.  She initially met with my partner, Denys Boo PA-C, about 3 months ago for evaluation of frequent loose stool for the past several months.  She described having occasional abdominal discomfort and bloating, but this was not a frequent symptom.  Her initial workup included laboratory studies to rule out infectious and inflammatory diarrhea.  Her CRP level came back very mildly elevated but fecal calprotectin were normal, infectious stool tests were normal, and celiac serologies were normal as well.  Abdominal x-ray did not show any evidence of obstruction or significant stool burden.  She was advised to start soluble fiber supplementation.  She has found this mildly helpful, but continues to have frequent loose stools.  She also mentions that she has had intermittent hematochezia, described as darker blood appearing on the toilet paper and the surface of the stool.  There is also been episodes of fecal incontinence with coughing or sneezing.    ROS:    10 point ROS neg other than the symptoms noted above in the HPI.    PREVIOUS ENDOSCOPY:  None    PERTINENT RELEVANT IMAGING OR LABS:  Reviewed    ALLERGIES:     Allergies   Allergen Reactions     Blood Transfusion Related (Informational Only) Other (See Comments)     Patient has a history of a clinically significant antibody against RBC antigens.  A delay in compatible RBCs may occur.     Septra [Sulfamethoxazole-Trimethoprim]      Sorbitan      Sulfamethoxazole-Trimethoprim Hives     As a child       PERTINENT MEDICATIONS:    Current Outpatient Medications:      albuterol (PROAIR HFA/PROVENTIL HFA/VENTOLIN HFA) 108 (90 Base) MCG/ACT inhaler, INHALE 2 PUFFS BY MOUTH AS NEEDED EVERY 4 HOURS UNTIL DIRECTED TO STOP, Disp: , Rfl:      ammonium lactate (LAC-HYDRIN) 12 % lotion, Apply topically 2 times daily as needed for dry skin, Disp: 360 g, Rfl: 3     busPIRone HCl (BUSPAR)  30 MG tablet, Take 30 mg by mouth 2 times daily, Disp: , Rfl:      escitalopram (LEXAPRO) 20 MG tablet, TAKE 1 TABLET BY MOUTH EVERY DAY, Disp: 90 tablet, Rfl: 0     fluocinolone acetonide oil 0.01 % ear drops, Place 0.25 mLs (5 drops) in ear(s) every other day, Disp: 20 mL, Rfl: 4     loratadine (CLARITIN) 10 MG tablet, Take 20 mg by mouth daily, Disp: , Rfl:      risperiDONE (RISPERDAL) 0.25 MG tablet, TAKE 1 TABLET BY MOUTH TWICE DAILY AS NEEDED FOR ANXIETY, AGITATION, OR SLEEP, Disp: , Rfl:     PROBLEM LIST  Patient Active Problem List    Diagnosis Date Noted     Cervical cancer screening 2019     Priority: Medium     8074-9719: NIL Pap's (care everywhere)        (normal spontaneous vaginal delivery) 2019     Priority: Medium     39 weeks gestation of pregnancy 2019     Priority: Medium     Seasonal allergic rhinitis 2018     Priority: Medium     Scalp psoriasis 2018     Priority: Medium     Keratosis pilaris 2018     Priority: Medium     Mild major depression (H) 2018     Priority: Medium     Family history of colon cancer 2018     Priority: Medium     Supervision of normal pregnancy 2017     Priority: Medium       PERTINENT PAST MEDICAL HISTORY:  Past Medical History:   Diagnosis Date     Depressive disorder Age 15    Anxiety and depression has worsened since swcond birth     Family history of colon cancer 2018       PREVIOUS SURGERIES:  Past Surgical History:   Procedure Laterality Date     EYE SURGERY      LASIK       SOCIAL HISTORY:  Social History     Socioeconomic History     Marital status:      Spouse name: Not on file     Number of children: 2     Years of education: Not on file     Highest education level: Not on file   Occupational History     Occupation: Nurse   Tobacco Use     Smoking status: Never     Smokeless tobacco: Never   Vaping Use     Vaping status: Never Used   Substance and Sexual Activity     Alcohol use: No      Comment: once a week     Drug use: No     Sexual activity: Yes     Partners: Male     Birth control/protection: Male Surgical     Comment: Vasectomy   Other Topics Concern     Parent/sibling w/ CABG, MI or angioplasty before 65F 55M? No   Social History Narrative     Not on file     Social Determinants of Health     Financial Resource Strain: Low Risk  (3/19/2024)    Financial Resource Strain      Within the past 12 months, have you or your family members you live with been unable to get utilities (heat, electricity) when it was really needed?: No   Food Insecurity: High Risk (3/19/2024)    Food Insecurity      Within the past 12 months, did you worry that your food would run out before you got money to buy more?: Yes      Within the past 12 months, did the food you bought just not last and you didn t have money to get more?: No   Transportation Needs: Low Risk  (3/19/2024)    Transportation Needs      Within the past 12 months, has lack of transportation kept you from medical appointments, getting your medicines, non-medical meetings or appointments, work, or from getting things that you need?: No   Physical Activity: Insufficiently Active (3/19/2024)    Exercise Vital Sign      Days of Exercise per Week: 2 days      Minutes of Exercise per Session: 20 min   Stress: Stress Concern Present (3/19/2024)    [employer] [place] of Occupational Health - Occupational Stress Questionnaire      Feeling of Stress : To some extent   Social Connections: Unknown (3/19/2024)    Social Connection and Isolation Panel [NHANES]      Frequency of Communication with Friends and Family: Not on file      Frequency of Social Gatherings with Friends and Family: Twice a week      Attends Sikh Services: Not on file      Active Member of Clubs or Organizations: Not on file      Attends Club or Organization Meetings: Not on file      Marital Status: Not on file   Interpersonal Safety: Low Risk  (3/20/2024)    Interpersonal Safety      Do  "you feel physically and emotionally safe where you currently live?: Yes      Within the past 12 months, have you been hit, slapped, kicked or otherwise physically hurt by someone?: No      Within the past 12 months, have you been humiliated or emotionally abused in other ways by your partner or ex-partner?: No   Housing Stability: Low Risk  (3/19/2024)    Housing Stability      Do you have housing? : Yes      Are you worried about losing your housing?: No       FAMILY HISTORY:  Family History   Problem Relation Age of Onset     Diabetes Brother      Substance Abuse Brother      Thyroid Disease Brother      Colon Cancer Father         age 62     Thyroid Disease Maternal Grandmother         Also had Alzheimer s     Prostate Cancer Maternal Grandfather         Also had colon cancer       Past/family/social history reviewed and no changes    PHYSICAL EXAMINATION:  Constitutional: aaox3, cooperative, pleasant, not dyspneic/diaphoretic, no acute distress  Vitals reviewed: /70 (BP Location: Left arm, Patient Position: Sitting, Cuff Size: Adult Regular)   Pulse 68   Ht 1.74 m (5' 8.5\")   Wt 97.5 kg (215 lb)   SpO2 96%   BMI 32.22 kg/m    Wt:   Wt Readings from Last 2 Encounters:   07/30/24 97.5 kg (215 lb)   04/30/24 95.6 kg (210 lb 11.2 oz)      Eyes: Sclera anicteric/injected  CV: No edema  Respiratory: Unlabored breathing  Skin: warm, perfused, no jaundice  Psych: Normal affect  MSK: Normal gait                      Again, thank you for allowing me to participate in the care of your patient.        Sincerely,        Fabián Hurtado PA-C  "

## 2024-07-30 NOTE — PATIENT INSTRUCTIONS
It was nice meeting you today.  As we discussed, I recommended the following:    -Diagnostic colonoscopy to assess source of intermittent rectal bleeding and definitively rule out intestinal inflammation as the source of diarrhea    -Pelvic floor testing to evaluate for pelvic floor dysfunction and guide further therapy is to eliminate frequent stools and incontinence    One of our schedulers will contact you in the next couple of days to offer some dates and times for the colonoscopy.  We will also send a referral to the pelvic floor center to coordinate the pelvic floor testing.  You should be able to call their office at [phone] in a day or 2 to schedule this.  They have locations any [facility].

## 2024-07-30 NOTE — NURSING NOTE
"Chief Complaint   Patient presents with    Follow Up     3 month follow up for diarrhea.  Last seen per Denys Boo PA-C      Vitals:    07/30/24 1429   BP: 105/70   BP Location: Left arm   Patient Position: Sitting   Cuff Size: Adult Regular   Pulse: 68   SpO2: 96%   Weight: 97.5 kg (215 lb)   Height: 1.74 m (5' 8.5\")     Body mass index is 32.22 kg/m .    Medications were reconciled.    Does patient need any medication refills at today's visit? No        Kaylee Contreras CMA    "

## 2024-07-30 NOTE — PROGRESS NOTES
LPN faxed pelvic floor referral to the [facility] per Fabián Hurtado PA-C order.   Fax number: [phone]  Fax transmission confirmed successful via right fax.     Fabián Hurtado PA-C Lacher, Stacy, LPN Hello,    I am recommending this patient go to the pelvic floor center for testing and consultation.  Diagnosis is fecal incontinence.    Thank you,    Fabián Koo LPN

## 2024-07-31 ENCOUNTER — TELEPHONE (OUTPATIENT)
Dept: GASTROENTEROLOGY | Facility: CLINIC | Age: 34
End: 2024-07-31
Payer: COMMERCIAL

## 2024-07-31 NOTE — TELEPHONE ENCOUNTER
"Endoscopy Scheduling Screen    Have you had a positive Covid test in the last 14 days?  No    What is your communication preference for Instructions and/or Bowel Prep?   MyChart    What insurance is in the chart?  Other:  BCBS    Ordering/Referring Provider: LC LEONARD   (If ordering provider performs procedure, schedule with ordering provider unless otherwise instructed. )    BMI: Estimated body mass index is 32.22 kg/m  as calculated from the following:    Height as of 7/30/24: 1.74 m (5' 8.5\").    Weight as of 7/30/24: 97.5 kg (215 lb).     Sedation Ordered  moderate sedation.   If patient BMI > 50 do not schedule in [facility].    If patient BMI > 45 do not schedule at [facility].    Are you taking methadone or Suboxone?  No    Have you had difficulties, pain, or discomfort during past endoscopy procedures?  No    Are you taking any prescription medications for pain 3 or more times per week?   NO, No RN review required.    Do you have a history of malignant hyperthermia?  No    (Females) Are you currently pregnant?   No     Have you been diagnosed or told you have pulmonary hypertension?   No    Do you have an LVAD?  No    Have you been told you have moderate to severe sleep apnea?  No    Have you been told you have COPD, asthma, or any other lung disease?  No    Do you have any heart conditions?  No     Have you ever had or are you waiting for an organ transplant?  No. Continue scheduling, no site restrictions.    Have you had a stroke or transient ischemic attack (TIA aka \"mini stroke\" in the last 6 months?   No    Have you been diagnosed with or been told you have cirrhosis of the liver?   No    Are you currently on dialysis?   No    Do you need assistance transferring?   No    BMI: Estimated body mass index is 32.22 kg/m  as calculated from the following:    Height as of 7/30/24: 1.74 m (5' 8.5\").    Weight as of 7/30/24: 97.5 kg (215 lb).     Is patients BMI > 40 and scheduling location [facility]?  No    Do you take an " injectable medication for weight loss or diabetes (excluding insulin)?  No    Do you take the medication Naltrexone?  No    Do you take blood thinners?  No       Prep   Are you currently on dialysis or do you have chronic kidney disease?  No    Do you have a diagnosis of diabetes?  No    Do you have a diagnosis of cystic fibrosis (CF)?  No    On a regular basis do you go 3 -5 days between bowel movements?  No    BMI > 40?  No    Preferred Pharmacy:    [id number] - [address]  [address] [facility] [address]  Phone: [phone] Fax: [phone]      Final Scheduling Details     Procedure scheduled  Colonoscopy    Surgeon:  APRIL     Date of procedure:  9/10/24     Pre-OP / PAC:   No - Not required for this site.    Location  [facility] - Per order.    Sedation   Moderate Sedation - Per order.      Patient Reminders:   You will receive a call from a Nurse to review instructions and health history.  This assessment must be completed prior to your procedure.  Failure to complete the Nurse assessment may result in the procedure being cancelled.      On the day of your procedure, please designate an adult(s) who can drive you home stay with you for the next 24 hours. The medicines used in the exam will make you sleepy. You will not be able to drive.      You cannot take public transportation, ride share services, or non-medical taxi service without a responsible caregiver.  Medical transport services are allowed with the requirement that a responsible caregiver will receive you at your destination.  We require that drivers and caregivers are confirmed prior to your procedure.

## 2024-08-08 ENCOUNTER — TRANSFERRED RECORDS (OUTPATIENT)
Dept: HEALTH INFORMATION MANAGEMENT | Facility: CLINIC | Age: 34
End: 2024-08-08
Payer: COMMERCIAL

## 2024-09-03 ENCOUNTER — TELEPHONE (OUTPATIENT)
Dept: GASTROENTEROLOGY | Facility: CLINIC | Age: 34
End: 2024-09-03
Payer: COMMERCIAL

## 2024-09-03 NOTE — TELEPHONE ENCOUNTER
Caller: Ct    Reason for Reschedule/Cancellation   (please be detailed, any staff messages or encounters to note?): Patient needs different date      Prior to reschedule please review:  Ordering Provider: Bryant  Sedation Determined: CS  Does patient have any ASC Exclusions, please identify?: N      Notes on Cancelled Procedure:  Procedure: Lower Endoscopy [Colonoscopy]   Date: 9/10/24  Location: [facility]; [address], [place] [address]   Surgeon: Sara      Rescheduled: Yes,   Procedure: Lower Endoscopy [Colonoscopy]    Date: 10/9/24  Location: [facility]; [address], [place] [address]    Surgeon: Sage   Sedation Level Scheduled  CS ,  Reason for Sedation Level Protocol   Instructions updated and sent: Yes     Does patient need PAC or Pre -Op Rescheduled? : N       Did you cancel or rescheduled an EUS procedure? No.

## 2024-09-27 ENCOUNTER — TELEPHONE (OUTPATIENT)
Dept: GASTROENTEROLOGY | Facility: CLINIC | Age: 34
End: 2024-09-27
Payer: COMMERCIAL

## 2024-09-27 NOTE — TELEPHONE ENCOUNTER
Pre visit planning completed.      Procedure details:    Patient scheduled for Colonoscopy on 10/9/24.     Arrival time: 1150. Procedure time 1235    Facility location: [facility]; [address], [place] [address]. Check in location: 2nd Floor at Surgery desk.    Sedation type: Conscious sedation     Pre op exam needed? No.    Indication for procedure: : Irritable bowel syndrome with constipation       Chart review:     Electronic implanted devices? No    Recent diagnosis of diverticulitis within the last 6 weeks? No      Medication review:    Diabetic? No    Anticoagulants? No    Weight loss medication/injectable? No GLP-1 medication per patient's medication list.  RN will verify with pre-assessment call.    Other medication HOLDING recommendations:  N/A      Prep for procedure:     Bowel prep recommendation: Standard Miralax  Due to: standard bowel prep.    ** Order notes constipation, however, gastro OV 7/30/24 discusses recurring diarrhea.  Discuss with patient.    Prep instructions sent via [employer]         Corinne Kliber, RN  Endoscopy Procedure Pre Assessment RN  [phone] option 2

## 2024-09-30 NOTE — TELEPHONE ENCOUNTER
Pre assessment completed for upcoming procedure.   (Please see previous telephone encounter notes for complete details)    Patient  returned call.       Procedure details:    Arrival time and facility location reviewed.    Pre op exam needed? No.    Designated  policy reviewed. Instructed to have someone stay 6  hours post procedure.       Medication review:    Medications reviewed. Please see supporting documentation below. Holding recommendations discussed (if applicable).       Prep for procedure:     Procedure prep instructions reviewed.        Any additional information needed:  N/A      Patient  verbalized understanding and had no questions or concerns at this time.      Romana Guallpa RN  Endoscopy Procedure Pre Assessment   [phone] option 2

## 2024-09-30 NOTE — TELEPHONE ENCOUNTER
Called the Pt to discuss below. The Pt did  but she cannot talk at the moment. She will call us back.     Tea Avila RN   Endoscopy Procedure Pre Assessment RN

## 2024-10-08 ENCOUNTER — ANESTHESIA EVENT (OUTPATIENT)
Dept: SURGERY | Facility: AMBULATORY SURGERY CENTER | Age: 34
End: 2024-10-08
Payer: COMMERCIAL

## 2024-10-09 ENCOUNTER — ANESTHESIA (OUTPATIENT)
Dept: SURGERY | Facility: AMBULATORY SURGERY CENTER | Age: 34
End: 2024-10-09
Payer: COMMERCIAL

## 2024-10-09 ENCOUNTER — PATIENT OUTREACH (OUTPATIENT)
Dept: ONCOLOGY | Facility: CLINIC | Age: 34
End: 2024-10-09

## 2024-10-09 ENCOUNTER — HOSPITAL ENCOUNTER (OUTPATIENT)
Facility: AMBULATORY SURGERY CENTER | Age: 34
Discharge: HOME | End: 2024-10-09
Attending: INTERNAL MEDICINE
Payer: COMMERCIAL

## 2024-10-09 VITALS
RESPIRATION RATE: 16 BRPM | DIASTOLIC BLOOD PRESSURE: 78 MMHG | OXYGEN SATURATION: 98 % | TEMPERATURE: 97.1 F | SYSTOLIC BLOOD PRESSURE: 112 MMHG

## 2024-10-09 VITALS — HEART RATE: 65 BPM

## 2024-10-09 DIAGNOSIS — K63.5 COLON POLYPOSIS: Primary | ICD-10-CM

## 2024-10-09 LAB — COLONOSCOPY: NORMAL

## 2024-10-09 PROCEDURE — G8907 PT DOC NO EVENTS ON DISCHARG: HCPCS

## 2024-10-09 PROCEDURE — 45380 COLONOSCOPY AND BIOPSY: CPT | Mod: XS

## 2024-10-09 PROCEDURE — 45378 DIAGNOSTIC COLONOSCOPY: CPT | Performed by: NURSE ANESTHETIST, CERTIFIED REGISTERED

## 2024-10-09 PROCEDURE — G8918 PT W/O PREOP ORDER IV AB PRO: HCPCS

## 2024-10-09 PROCEDURE — 45378 DIAGNOSTIC COLONOSCOPY: CPT | Performed by: ANESTHESIOLOGY

## 2024-10-09 PROCEDURE — 45385 COLONOSCOPY W/LESION REMOVAL: CPT

## 2024-10-09 PROCEDURE — 88305 TISSUE EXAM BY PATHOLOGIST: CPT | Performed by: STUDENT IN AN ORGANIZED HEALTH CARE EDUCATION/TRAINING PROGRAM

## 2024-10-09 PROCEDURE — 45381 COLONOSCOPY SUBMUCOUS NJX: CPT

## 2024-10-09 RX ORDER — NALOXONE HYDROCHLORIDE 0.4 MG/ML
0.4 INJECTION, SOLUTION INTRAMUSCULAR; INTRAVENOUS; SUBCUTANEOUS
Status: DISCONTINUED | OUTPATIENT
Start: 2024-10-09 | End: 2024-10-10 | Stop reason: HOSPADM

## 2024-10-09 RX ORDER — FLUMAZENIL 0.1 MG/ML
0.2 INJECTION, SOLUTION INTRAVENOUS
Status: DISCONTINUED | OUTPATIENT
Start: 2024-10-09 | End: 2024-10-10 | Stop reason: HOSPADM

## 2024-10-09 RX ORDER — LIDOCAINE 40 MG/G
CREAM TOPICAL
Status: DISCONTINUED | OUTPATIENT
Start: 2024-10-09 | End: 2024-10-10 | Stop reason: HOSPADM

## 2024-10-09 RX ORDER — LIDOCAINE HYDROCHLORIDE 20 MG/ML
INJECTION, SOLUTION INFILTRATION; PERINEURAL PRN
Status: DISCONTINUED | OUTPATIENT
Start: 2024-10-09 | End: 2024-10-09

## 2024-10-09 RX ORDER — ONDANSETRON 2 MG/ML
4 INJECTION INTRAMUSCULAR; INTRAVENOUS EVERY 6 HOURS PRN
Status: DISCONTINUED | OUTPATIENT
Start: 2024-10-09 | End: 2024-10-10 | Stop reason: HOSPADM

## 2024-10-09 RX ORDER — PROPOFOL 10 MG/ML
INJECTION, EMULSION INTRAVENOUS PRN
Status: DISCONTINUED | OUTPATIENT
Start: 2024-10-09 | End: 2024-10-09

## 2024-10-09 RX ORDER — PROPOFOL 10 MG/ML
INJECTION, EMULSION INTRAVENOUS CONTINUOUS PRN
Status: DISCONTINUED | OUTPATIENT
Start: 2024-10-09 | End: 2024-10-09

## 2024-10-09 RX ORDER — NALOXONE HYDROCHLORIDE 0.4 MG/ML
0.2 INJECTION, SOLUTION INTRAMUSCULAR; INTRAVENOUS; SUBCUTANEOUS
Status: DISCONTINUED | OUTPATIENT
Start: 2024-10-09 | End: 2024-10-10 | Stop reason: HOSPADM

## 2024-10-09 RX ORDER — ONDANSETRON 2 MG/ML
4 INJECTION INTRAMUSCULAR; INTRAVENOUS
Status: DISCONTINUED | OUTPATIENT
Start: 2024-10-09 | End: 2024-10-10 | Stop reason: HOSPADM

## 2024-10-09 RX ORDER — ONDANSETRON 4 MG/1
4 TABLET, ORALLY DISINTEGRATING ORAL EVERY 6 HOURS PRN
Status: DISCONTINUED | OUTPATIENT
Start: 2024-10-09 | End: 2024-10-10 | Stop reason: HOSPADM

## 2024-10-09 RX ORDER — PROCHLORPERAZINE MALEATE 10 MG
10 TABLET ORAL EVERY 6 HOURS PRN
Status: DISCONTINUED | OUTPATIENT
Start: 2024-10-09 | End: 2024-10-10 | Stop reason: HOSPADM

## 2024-10-09 RX ADMIN — PROPOFOL 200 MCG/KG/MIN: 10 INJECTION, EMULSION INTRAVENOUS at 12:13

## 2024-10-09 RX ADMIN — PROPOFOL 30 MG: 10 INJECTION, EMULSION INTRAVENOUS at 12:15

## 2024-10-09 RX ADMIN — PROPOFOL 7 MG: 10 INJECTION, EMULSION INTRAVENOUS at 12:13

## 2024-10-09 RX ADMIN — LIDOCAINE HYDROCHLORIDE 60 MG: 20 INJECTION, SOLUTION INFILTRATION; PERINEURAL at 12:13

## 2024-10-09 NOTE — H&P
ENDOSCOPY PRE-SEDATION H&P FOR OUTPATIENT PROCEDURES    Sophie Felton  [id number]  1990    Procedure: colonoscopy    Pre-procedure diagnosis: diarrhea    Past medical history:   Past Medical History:   Diagnosis Date    Depressive disorder Age 15    Anxiety and depression has worsened since swcond birth    Family history of colon cancer 07/12/2018       Past surgical history:   Past Surgical History:   Procedure Laterality Date    EYE SURGERY  2010    LASIK       Current Outpatient Medications   Medication Sig Dispense Refill    albuterol (PROAIR HFA/PROVENTIL HFA/VENTOLIN HFA) 108 (90 Base) MCG/ACT inhaler INHALE 2 PUFFS BY MOUTH AS NEEDED EVERY 4 HOURS UNTIL DIRECTED TO STOP      ammonium lactate (LAC-HYDRIN) 12 % lotion Apply topically 2 times daily as needed for dry skin 360 g 3    busPIRone HCl (BUSPAR) 30 MG tablet Take 30 mg by mouth 2 times daily      escitalopram (LEXAPRO) 20 MG tablet TAKE 1 TABLET BY MOUTH EVERY DAY 90 tablet 0    fluocinolone acetonide oil 0.01 % ear drops Place 0.25 mLs (5 drops) in ear(s) every other day 20 mL 4    loratadine (CLARITIN) 10 MG tablet Take 20 mg by mouth daily      risperiDONE (RISPERDAL) 0.25 MG tablet TAKE 1 TABLET BY MOUTH TWICE DAILY AS NEEDED FOR ANXIETY, AGITATION, OR SLEEP       Current Facility-Administered Medications   Medication Dose Route Frequency Provider Last Rate Last Admin    lidocaine (LMX4) kit   Topical Q1H PRN Ronald Cazares MD        lidocaine 1 % 0.1-1 mL  0.1-1 mL Other Q1H PRN Ronald Cazraes MD        ondansetron (ZOFRAN) injection 4 mg  4 mg Intravenous Once PRN Ronald Cazares MD        sodium chloride (PF) 0.9% PF flush 3 mL  3 mL Intracatheter Q8H Ronald Cazares MD        sodium chloride (PF) 0.9% PF flush 3 mL  3 mL Intracatheter q1 min prn Ronald Cazares MD           Allergies   Allergen Reactions    Blood Transfusion Related (Informational Only) Other (See Comments)      Patient has a history of a clinically significant antibody against RBC antigens.  A delay in compatible RBCs may occur.    Septra [Sulfamethoxazole-Trimethoprim]     Sorbitan     Sulfamethoxazole-Trimethoprim Hives     As a child       History of Anesthesia/Sedation Problems: no    PHYSICAL EXAMINATION:  Constitutional: aaox3, cooperative, pleasant  Vitals reviewed: /72   Temp 97.1  F (36.2  C) (Temporal)   Resp 16   LMP 10/03/2024 (Approximate)   SpO2 96%   Wt:   Wt Readings from Last 2 Encounters:   07/30/24 97.5 kg (215 lb)   04/30/24 95.6 kg (210 lb 11.2 oz)      Eyes: Sclera anicteric/injected  Ears/nose/mouth/throat: Normal oropharynx without ulcers or exudate, mucus membranes moist, hearing intact  Neck: supple, thyroid normal size  CV: No edema  Respiratory: Unlabored breathing  Lymph: No submandibular, supraclavicular or inguinal lymphadenopathy  Abd: Nondistended, no masses, nontender  Skin: warm, perfused, no jaundice  Psych: Normal affect  MSK: normal movement on limited exam.    ASA Score: See Provation note    Assessment/Plan:     The patient is an appropriate candidate to receive sedation.    Informed consent was discussed with the patient/family, including the risks, benefits, potential complications and any alternative options associated with sedation.    Patient assessment completed just prior to sedation and while under constant observation by the provider. Condition determined to be adequate for proceeding with sedation.    The specific risks for the procedure were discussed with the patient at the time of informed consent and include but are not limited to perforation which could require surgery, missing significant neoplasm or lesion, hemorrhage and adverse sedative complication.      Ronald Cazares MD

## 2024-10-09 NOTE — ANESTHESIA CARE TRANSFER NOTE
Patient: Sophie Felton    Procedure: Procedure(s):  Colonoscopy with CO2 insufflation  COLONOSCOPY, FLEXIBLE, WITH LESION REMOVAL USING SNARE  COLONOSCOPY, WITH POLYPECTOMY AND BIOPSY       Diagnosis: Chronic diarrhea [K52.9]  Hematochezia [K92.1]  Incontinence of feces, unspecified fecal incontinence type [R15.9]  Diagnosis Additional Information: No value filed.    Anesthesia Type:   No value filed.     Note:    Oropharynx: oropharynx clear of all foreign objects  Level of Consciousness: drowsy  Oxygen Supplementation: nasal cannula  Level of Supplemental Oxygen (L/min / FiO2): 3  Independent Airway: airway patency satisfactory and stable  Dentition: dentition unchanged  Vital Signs Stable: post-procedure vital signs reviewed and stable  Report to RN Given: handoff report given  Patient transferred to: Phase II    Handoff Report: Identifed the Patient, Identified the Reponsible Provider, Reviewed the pertinent medical history, Discussed the surgical course, Reviewed Intra-OP anesthesia mangement and issues during anesthesia, Set expectations for post-procedure period and Allowed opportunity for questions and acknowledgement of understanding      Vitals:  Vitals Value Taken Time   BP     Temp     Pulse 65 10/09/24 1315   Resp     SpO2         Electronically Signed By: CHRISTOPHER Velasquez CRNA  October 9, 2024  1:23 PM

## 2024-10-09 NOTE — ANESTHESIA POSTPROCEDURE EVALUATION
Patient: Sophie Felton    Procedure: Procedure(s):  Colonoscopy with CO2 insufflation  COLONOSCOPY, FLEXIBLE, WITH LESION REMOVAL USING SNARE  COLONOSCOPY, WITH POLYPECTOMY AND BIOPSY  TATTOOING, WITH COLONOSCOPY       Anesthesia Type:  MAC    Note:  Disposition: Outpatient   Postop Pain Control: Uneventful            Sign Out: Well controlled pain   PONV: No   Neuro/Psych: Uneventful            Sign Out: Acceptable/Baseline neuro status   Airway/Respiratory: Uneventful            Sign Out: Acceptable/Baseline resp. status   CV/Hemodynamics: Uneventful            Sign Out: Acceptable CV status; No obvious hypovolemia; No obvious fluid overload   Other NRE:    DID A NON-ROUTINE EVENT OCCUR? No           Last vitals:  Vitals Value Taken Time   /78 10/09/24 1355   Temp 97.1  F (36.2  C) 10/09/24 1355   Pulse     Resp 16 10/09/24 1355   SpO2 98 % 10/09/24 1355       Electronically Signed By: Triston Gill MD  October 9, 2024  2:16 PM

## 2024-10-09 NOTE — ANESTHESIA PREPROCEDURE EVALUATION
Anesthesia Pre-Procedure Evaluation    Patient: Sophie Felton   MRN: [id number] : 1990        Procedure : Procedure(s):  Colonoscopy with CO2 insufflation  COLONOSCOPY, FLEXIBLE, WITH LESION REMOVAL USING SNARE  COLONOSCOPY, WITH POLYPECTOMY AND BIOPSY  TATTOOING, WITH COLONOSCOPY          Past Medical History:   Diagnosis Date    Depressive disorder Age 15    Anxiety and depression has worsened since swcond birth    Family history of colon cancer 2018      Past Surgical History:   Procedure Laterality Date    EYE SURGERY      LASIK      Allergies   Allergen Reactions    Blood Transfusion Related (Informational Only) Other (See Comments)     Patient has a history of a clinically significant antibody against RBC antigens.  A delay in compatible RBCs may occur.    Septra [Sulfamethoxazole-Trimethoprim]     Sorbitan     Sulfamethoxazole-Trimethoprim Hives     As a child      Social History     Tobacco Use    Smoking status: Never    Smokeless tobacco: Never   Substance Use Topics    Alcohol use: No     Comment: once a week      Wt Readings from Last 1 Encounters:   24 97.5 kg (215 lb)        Anesthesia Evaluation            ROS/MED HX  ENT/Pulmonary:       Neurologic:       Cardiovascular:    (-) murmur   METS/Exercise Tolerance:     Hematologic:       Musculoskeletal:       GI/Hepatic: Comment: Diarrhea      Renal/Genitourinary:       Endo:       Psychiatric/Substance Use:     (+) psychiatric history depression       Infectious Disease:       Malignancy:       Other:            Physical Exam    Airway        Mallampati: I   TM distance: > 3 FB   Neck ROM: full   Mouth opening: > 3 cm    Respiratory Devices and Support         Dental     Comment: Teeth examined without any significant abnormality, patient denies loose, missing or chipped teeth or anything removable.         Cardiovascular          Rhythm and rate: regular and normal (-) no murmur    Pulmonary   pulmonary exam normal         "breath sounds clear to auscultation           OUTSIDE LABS:  CBC:   Lab Results   Component Value Date    WBC 13.4 (H) 03/20/2024    WBC 12.7 (H) 04/17/2019    HGB 13.9 03/20/2024    HGB 9.2 (L) 04/18/2019    HCT 42.0 03/20/2024    HCT 33.9 (L) 04/17/2019     03/20/2024     04/17/2019     BMP:   Lab Results   Component Value Date     03/20/2024    POTASSIUM 4.8 03/20/2024    CHLORIDE 104 03/20/2024    CO2 25 03/20/2024    BUN 13.4 03/20/2024    CR 0.86 03/20/2024    GLC 86 03/20/2024     COAGS: No results found for: \"PTT\", \"INR\", \"FIBR\"  POC: No results found for: \"BGM\", \"HCG\", \"HCGS\"  HEPATIC:   Lab Results   Component Value Date    ALBUMIN 4.9 03/20/2024    PROTTOTAL 7.5 03/20/2024    ALT 14 03/20/2024    AST 19 03/20/2024    ALKPHOS 54 03/20/2024    BILITOTAL 0.2 03/20/2024     OTHER:   Lab Results   Component Value Date    A1C 5.3 03/20/2024    YOSEF 10.1 (H) 03/20/2024    TSH 2.25 03/20/2024       Anesthesia Plan    ASA Status:  1    NPO Status:  NPO Appropriate    Anesthesia Type: MAC.     - Reason for MAC: immobility needed   Induction: Intravenous, Propofol.   Maintenance: TIVA.        Consents    Anesthesia Plan(s) and associated risks, benefits, and realistic alternatives discussed. Questions answered and patient/representative(s) expressed understanding.     - Discussed:     - Discussed with:  Patient      - Extended Intubation/Ventilatory Support Discussed: No.      - Patient is DNR/DNI Status: No     Use of blood products discussed: No .     Postoperative Care    Pain management: IV analgesics.   PONV prophylaxis: Ondansetron (or other 5HT-3), Background Propofol Infusion     Comments:    Other Comments: Discussed plan for IV anesthetic with native airway. Discussed potential need for conversion to general anesthetic with airway management and potential for recall.             Triston Gill MD    I have reviewed the pertinent notes and labs in the chart from the past 30 days and " (re)examined the patient.  Any updates or changes from those notes are reflected in this note.

## 2024-10-09 NOTE — PROGRESS NOTES
New Patient Oncology Nurse Navigator Note     Referring provider: Ronald Cazares MDMg [facility]     Referred to (specialty):  Genetic Counseling    Date Referral Received: 10/9/2024     Evaluation for: possible polyposis syndrome.  31 polyps on colonoscopy at a young age, fam hx CRC 1st deg relative.

## 2024-10-10 ENCOUNTER — TELEPHONE (OUTPATIENT)
Dept: GASTROENTEROLOGY | Facility: CLINIC | Age: 34
End: 2024-10-10
Payer: COMMERCIAL

## 2024-10-10 NOTE — TELEPHONE ENCOUNTER
----- Message from Marina ROD sent at 10/9/2024  4:13 PM CDT -----  Hello,  Please see the below from Dr. Cazares.   Thanks,  Marina Kenney RN  ----- Message -----  From: Ronald Cazares MD  Sent: 10/9/2024   3:01 PM CDT  To: Fabián Hurtado PA-C; #    Hi Team,  Can we schedule follow up visit with me or Fabián?  Patient with ileitis on colonoscopy, ideally we should try to get her seen in the next 8 weeks.  Thanks,   -Elliott

## 2024-10-10 NOTE — TELEPHONE ENCOUNTER
10/10 Called patient (1st attempt) left voicemail and provided [phone] for patient to call back and schedule appointment with  on Fabián Hurtado PA-C within the next 8 weeks.     Cierra Yeh   Gastroenterology, Infectious Diseases, Nephrology, Pulmonology and Rheumatology Specialties  [facility]

## 2024-10-14 LAB
PATH REPORT.COMMENTS IMP SPEC: NORMAL
PATH REPORT.FINAL DX SPEC: NORMAL
PATH REPORT.GROSS SPEC: NORMAL
PATH REPORT.MICROSCOPIC SPEC OTHER STN: NORMAL
PATH REPORT.RELEVANT HX SPEC: NORMAL
PHOTO IMAGE: NORMAL

## 2024-10-15 ENCOUNTER — VIRTUAL VISIT (OUTPATIENT)
Dept: ONCOLOGY | Facility: CLINIC | Age: 34
End: 2024-10-15
Attending: INTERNAL MEDICINE
Payer: COMMERCIAL

## 2024-10-15 DIAGNOSIS — K63.5 COLON POLYPOSIS: ICD-10-CM

## 2024-10-15 DIAGNOSIS — Z80.42 FAMILY HISTORY OF PROSTATE CANCER: ICD-10-CM

## 2024-10-15 DIAGNOSIS — Z80.0 FAMILY HISTORY OF COLON CANCER: Primary | ICD-10-CM

## 2024-10-15 PROCEDURE — 96040 HC GENETIC COUNSELING, EACH 30 MINUTES: CPT | Mod: GT,95 | Performed by: GENETIC COUNSELOR, MS

## 2024-10-15 NOTE — NURSING NOTE
Current patient location: [address] [place] [address]    Is the patient currently in the state of [place]? YES    Visit mode:VIDEO    If the visit is dropped, the patient can be reconnected by: VIDEO VISIT: Text to cell phone:   Telephone Information:   Mobile [phone]       Will anyone else be joining the visit? NO  (If patient encounters technical issues they should call [phone])    Are changes needed to the allergy or medication list? N/A    Are refills needed on medications prescribed by this physician? NO    Rooming Documentation:  Questionnaire(s) not done per department protocol    Reason for visit: Consult    Eri MENESESF

## 2024-10-15 NOTE — LETTER
"10/15/2024      Sophie Felton  [address] [address]      Dear Colleague,    Thank you for referring your patient, Sophie Felton, to the [facility]. Please see a copy of my visit note below.    10/15/2024    Virtual Visit Details  Type of service:  Video Visit   Originating Location (pt. Location): Home  Distant Location (provider location):  Off-site  Platform used for Video Visit: Lecture[employer]  Length of video visit: 47 minutes    Referring Provider: Ronald Cazares MD    Presenting Information:   Today Sophie \"Nunu" elected for a virtual genetic counseling visit through the Cancer Risk Management Program to discuss her personal history of colon polyps and family history of cancer. We reviewed this history, cancer screening recommendations, and available genetic testing options.    Personal History:  Ct is a 34 year old female. She does not have any personal history of cancer.    She had her first menstrual period at age 11, her first child at age 25, and is premenopausal. Ct has her ovaries, fallopian tubes and uterus in place, and she has had no ovarian cancer screening to date. She reports that she has never used hormone replacement therapy.      Her first colonoscopy in October 2024 removed 31 polyps - five sessile serrated adenomas (one polyp 15mm in size), three tubular adenomas, six polyps that were a combination of sessile serrated adenomas and tubular adenomas, and 17 polyps that were a combination of sessile serrated adenomas and hyperplastic polyps; follow-up was recommended in one year. She has not had a mammogram. Aside from dermatologic exams, she does not regularly do any other cancer screening at this time.    Family History: (Please see scanned pedigree for detailed family history information)  Ct's father was diagnosed with colon cancer at age 63 and passed away at age 66; he may have polyps, but additional details are " unknown.  One maternal uncle was diagnosed with and passed away from non-Hodgkin's lymphoma at age 9.  Ct's maternal grandmother had several skin cancers removed in her 80's before passing away at age 92.  Ct's maternal grandfather is 93 and was diagnosed with prostate cancer in his 70's, followed by colon cancer at age 92.  His sister's son was diagnosed with colon cancer in his 50/60's.  Of note, Ct shared that she had one supernumerary tooth removed at age 6. Her 7 year old daughter and one paternal uncle also had one extra tooth, and her nephew has 17 missing adult teeth.  Her maternal ethnicity is [place] and English. Her paternal ethnicity is Citizen of [place] and [place]i. There is no known Ashkenazi Muslim ancestry on either side of her family.    Discussion:  We reviewed the features of sporadic, familial, and hereditary cancers. In looking at Ct's personal and family history, it is possible that a cancer susceptibility gene is present as Ct has had 31 polyps removed under age 39 and a few relatives on each side of her family have been diagnosed with a potentially related cancer (colon); Ct and several of her close relatives have also had dental differences, which can be seen with several hereditary polyposis syndromes.  We discussed the natural history and genetics of hereditary polyposis, including familial adenomatous polyposis (FAP).   Familial adenomatous polyposis (FAP) is a condition caused by mutations in the APC gene. Individuals with classic FAP typically have many (more than 100) adenoma type polyps in the colon and significantly increased risk for colon cancer (~100% lifetime risk). Attenuated FAP (AFAP) is also a condition caused by mutations in the APC gene. It is milder than classic FAP, however, as affected usually have  polyps and the lifetime risk of colon cancer is lower at approximately 70%. Extracolonic manifestations of FAP and AFAP include congenital hypertrophy of  retinal pigment epithelium (CHRPE), osteomas, supernumerary teeth, odontomas, desmoids, epidermoid cysts, duodenal and other small bowel adenomas, and gastric fundic gland polyps. Other cancers associated with FAP and AFAP include thyroid, pancreatic, gastric, and duodenal cancers.   We discussed that there are additional genes that could cause increased risk for colon cancer and polyps. For example, mutations in the AXIN2 gene are also associated with increased risk for colon polyps and dental differences. Also, individuals with serrated polyposis syndrome (with/without a RNF43 gene mutation) are at increased risk for serrated adenomas. As many of these genes present with overlapping features in a family and accurate cancer risk cannot always be established based upon the pedigree analysis alone, it would be reasonable for Ct to consider panel genetic testing to analyze multiple genes at once.  Based on her personal and family history, Ct meets current National Comprehensive Cancer Network (NCCN) criteria for genetic testing of high penetrance polyposis genes (i.e. APC, AXIN2, MUTYH, MSH3, NTHL1, POLD1, POLE, etc.).  A detailed handout regarding these genes/syndromes and the information we discussed was provided to Ct at the end of our appointment today and can be found in the after visit summary. Topics included: inheritance pattern, cancer risks, cancer screening recommendations, and also risks, benefits and limitations of testing.  We reviewed genetic testing options for hereditary colon polyposis and related cancers: targeted colon cancer/polyposis panel and expanded pan-cancer panels. She opted for testing of the APC gene, with reflex to the Comprehensive Hereditary Cancer Panel + RNF43, RPS20 Genes.  Consent was obtained over the video and Ct will schedule a lab appointment at her earliest convenience. Once her blood is drawn, it will be sent to the [facility]. Turn around time: 4-6 weeks after her blood is drawn and insurance pre-authorization is obtained.  Medical Management: For Ct, we reviewed that the information from genetic testing may determine:  additional cancer screening for which Ct may qualify (i.e. more frequent colonoscopies, regular upper endoscopies, mammogram and breast MRI, more frequent dermatologic exams, etc.),  options for risk reducing surgeries Ct could consider (i.e. bilateral mastectomy, surgery to remove her ovaries and/or uterus, etc.),    and targeted chemotherapies if she were to develop certain cancers in the future (i.e. immunotherapy for individuals with Rodas syndrome, PARP inhibitors, etc.).   These recommendations and possible targeted chemotherapies will be discussed in detail once genetic testing is completed.     Plan:  1) Today Ct elected to proceed with testing of the APC gene, with reflex to the Comprehensive Hereditary Cancer Panel + RNF43, RPS20 Genes, through [facility]. She will schedule a lab appointment at her earliest convenience.  2) The results should be available 4-6 weeks after her blood is drawn and insurance pre-authorization is obtained.  3) Ct will be contacted to schedule a virtual return visit with me to discuss the results.    Celia Roe MS, [facility]  Licensed, Certified Genetic Counselor  Office: [phone]  [email or web address]      Again, thank you for allowing me to participate in the care of your patient.        Sincerely,        Celia Roe, GC

## 2024-10-15 NOTE — PROGRESS NOTES
"10/15/2024    Virtual Visit Details  Type of service:  Video Visit   Originating Location (pt. Location): Home  Distant Location (provider location):  Off-site  Platform used for Video Visit: Appsembler  Length of video visit: 47 minutes    Referring Provider: Ronald Cazares MD    Presenting Information:   Today Sophie \"Ct\" elected for a virtual genetic counseling visit through the Cancer Risk Management Program to discuss her personal history of colon polyps and family history of cancer. We reviewed this history, cancer screening recommendations, and available genetic testing options.    Personal History:  Ct is a 34 year old female. She does not have any personal history of cancer.    She had her first menstrual period at age 11, her first child at age 25, and is premenopausal. Ct has her ovaries, fallopian tubes and uterus in place, and she has had no ovarian cancer screening to date. She reports that she has never used hormone replacement therapy.      Her first colonoscopy in October 2024 removed 31 polyps - five sessile serrated adenomas (one polyp 15mm in size), three tubular adenomas, six polyps that were a combination of sessile serrated adenomas and tubular adenomas, and 17 polyps that were a combination of sessile serrated adenomas and hyperplastic polyps; follow-up was recommended in one year. She has not had a mammogram. Aside from dermatologic exams, she does not regularly do any other cancer screening at this time.    Family History: (Please see scanned pedigree for detailed family history information)  Ct's father was diagnosed with colon cancer at age 63 and passed away at age 66; he may have polyps, but additional details are unknown.  One maternal uncle was diagnosed with and passed away from non-Hodgkin's lymphoma at age 9.  Ct's maternal grandmother had several skin cancers removed in her 80's before passing away at age 92.  Ct's maternal grandfather is 93 and was " diagnosed with prostate cancer in his 70's, followed by colon cancer at age 92.  His sister's son was diagnosed with colon cancer in his 50/60's.  Of note, Ct shared that she had one supernumerary tooth removed at age 6. Her 7 year old daughter and one paternal uncle also had one extra tooth, and her nephew has 17 missing adult teeth.  Her maternal ethnicity is [place] and English. Her paternal ethnicity is Mac[place] and [place]. There is no known Ashkenazi Roman Catholic ancestry on either side of her family.    Discussion:  We reviewed the features of sporadic, familial, and hereditary cancers. In looking at Ct's personal and family history, it is possible that a cancer susceptibility gene is present as Ct has had 31 polyps removed under age 39 and a few relatives on each side of her family have been diagnosed with a potentially related cancer (colon); Ct and several of her close relatives have also had dental differences, which can be seen with several hereditary polyposis syndromes.  We discussed the natural history and genetics of hereditary polyposis, including familial adenomatous polyposis (FAP).   Familial adenomatous polyposis (FAP) is a condition caused by mutations in the APC gene. Individuals with classic FAP typically have many (more than 100) adenoma type polyps in the colon and significantly increased risk for colon cancer (~100% lifetime risk). Attenuated FAP (AFAP) is also a condition caused by mutations in the APC gene. It is milder than classic FAP, however, as affected usually have  polyps and the lifetime risk of colon cancer is lower at approximately 70%. Extracolonic manifestations of FAP and AFAP include congenital hypertrophy of retinal pigment epithelium (CHRPE), osteomas, supernumerary teeth, odontomas, desmoids, epidermoid cysts, duodenal and other small bowel adenomas, and gastric fundic gland polyps. Other cancers associated with FAP and AFAP include thyroid, pancreatic,  gastric, and duodenal cancers.   We discussed that there are additional genes that could cause increased risk for colon cancer and polyps. For example, mutations in the AXIN2 gene are also associated with increased risk for colon polyps and dental differences. Also, individuals with serrated polyposis syndrome (with/without a RNF43 gene mutation) are at increased risk for serrated adenomas. As many of these genes present with overlapping features in a family and accurate cancer risk cannot always be established based upon the pedigree analysis alone, it would be reasonable for Ct to consider panel genetic testing to analyze multiple genes at once.  Based on her personal and family history, Ct meets current National Comprehensive Cancer Network (NCCN) criteria for genetic testing of high penetrance polyposis genes (i.e. APC, AXIN2, MUTYH, MSH3, NTHL1, POLD1, POLE, etc.).  A detailed handout regarding these genes/syndromes and the information we discussed was provided to Ct at the end of our appointment today and can be found in the after visit summary. Topics included: inheritance pattern, cancer risks, cancer screening recommendations, and also risks, benefits and limitations of testing.  We reviewed genetic testing options for hereditary colon polyposis and related cancers: targeted colon cancer/polyposis panel and expanded pan-cancer panels. She opted for testing of the APC gene, with reflex to the Comprehensive Hereditary Cancer Panel + RNF43, RPS20 Genes.  Consent was obtained over the video and Ct will schedule a lab appointment at her earliest convenience. Once her blood is drawn, it will be sent to the [facility]. Turn around time: 4-6 weeks after her blood is drawn and insurance pre-authorization is obtained.  Medical Management: For Ct, we reviewed that the information from genetic testing may determine:  additional cancer screening for which  Ct may qualify (i.e. more frequent colonoscopies, regular upper endoscopies, mammogram and breast MRI, more frequent dermatologic exams, etc.),  options for risk reducing surgeries Ct could consider (i.e. bilateral mastectomy, surgery to remove her ovaries and/or uterus, etc.),    and targeted chemotherapies if she were to develop certain cancers in the future (i.e. immunotherapy for individuals with Rodas syndrome, PARP inhibitors, etc.).   These recommendations and possible targeted chemotherapies will be discussed in detail once genetic testing is completed.     Plan:  1) Today Ct elected to proceed with testing of the APC gene, with reflex to the Comprehensive Hereditary Cancer Panel + RNF43, RPS20 Genes, through the Molecular Diagnostics Laboratory. She will schedule a lab appointment at her earliest convenience.  2) The results should be available 4-6 weeks after her blood is drawn and insurance pre-authorization is obtained.  3) Ct will be contacted to schedule a virtual return visit with me to discuss the results.    Celia Roe MS, [facility] [place]  Licensed, Certified Genetic Counselor  Office: [phone]  [email or web address]

## 2024-10-18 NOTE — PATIENT INSTRUCTIONS
Conditions discussed today:  Familial adenomatous polyposis  Serrated polyposis syndrome    Assessing Cancer Risk  Only about 5-10% of cancers are thought to be due to an inherited cancer susceptibility gene.    These families often have:  Several people with the same or related types of cancer  Cancers diagnosed at a young age (before age 50)  Individuals with more than one primary cancer  Multiple generations of the family affected with cancer    Genetic Testing  Genetic testing involves a blood or saliva test and will look at the genetic information in select genes for any harmful mutations that are associated with increased cancer risk.  If possible, it is recommended that the person(s) who has had cancer be tested before other family members.  That person will give us the most useful information about whether or not a specific gene is associated with the cancer in the family.     Results  There are three possible results of genetic testing:  Positive--a harmful mutation was identified  Negative--no mutation was identified  Variant of unknown significance--a variation in one of the genes was identified, but it is unclear how this impacts cancer risk in the family    Advantages and Disadvantages  There are advantages and disadvantages to genetic testing.    Advantages  May clarify your cancer risk  Can help you make medical decisions  May explain the cancers in your family  May give useful information to your family members (if you share your results)    Disadvantages  Possible negative emotional impact of learning about inherited cancer risk  Uncertainty in interpreting a negative test result in some situations  Possible genetic discrimination concerns (see below)    Inheritance   Mutations in most cancer risk genes are inherited in an autosomal dominant pattern.  This means that if a parent has a mutation, each of their children will have a 50% chance of inheriting that same mutation.  Therefore, each child  would have a 50% chance of being at increased risk for developing cancer.                                              Image obtained from Genetics Home Reference, 2013     Genetic Information Nondiscrimination Act (SOCORRO)  SOCORRO is a federal law that protects individuals from health insurance or employment discrimination based on a genetic test result alone.  Although rare, there are currently no legal protections in terms of life insurance, long term care, or disability insurances.  Visit the National Human Genome Research [place] at Genome.gov/47792800 to learn more.    Reducing Cancer Risk  If a harmful mutation is found in a cancer risk gene, there may be certain screening tests or preventative surgeries that can be offered. This information will be discussed after genetic testing is completed. If no mutations are found on genetic testing, screening is then recommended based on personal and/or family history of cancer.     Questions to Think About Regarding Genetic Testing  What effect will the test result have on me and my relationship with my family members if I have an inherited gene mutation?  If I don t have a gene mutation?  Should I share my test results, and how will my family react to this news, which may also affect them?  Are my children ready to learn new information that may one day affect their own health?    Resources  American Cancer Society (ACS) cancer.org   National Cancer [place] (NCI) cancer.gov     Please call us if you have any questions or concerns.   Cancer Risk Management Program [phone]-CANCER ([phone])  Kalia Rhoades, MS [facility] [place] [phone]  Elsie Benjamin, MS, [facility] [place] [phone]  Hortencia Gallegos, MS, [facility]  [phone]  Heavenly Giron, MS, [facility]  [phone]  Yokasta Howe, MS, [facility]  [phone]  Celia Roe, MS, [facility] [phone]  Kelly Pierre, MS, [facility] [phone]

## 2024-10-20 DIAGNOSIS — Z86.0100 HISTORY OF COLONIC POLYPS: Primary | ICD-10-CM

## 2024-10-22 ENCOUNTER — LAB (OUTPATIENT)
Dept: LAB | Facility: CLINIC | Age: 34
End: 2024-10-22
Payer: COMMERCIAL

## 2024-10-22 DIAGNOSIS — K63.5 COLON POLYPOSIS: ICD-10-CM

## 2024-10-22 DIAGNOSIS — Z80.0 FAMILY HISTORY OF COLON CANCER: ICD-10-CM

## 2024-10-22 DIAGNOSIS — Z80.42 FAMILY HISTORY OF PROSTATE CANCER: ICD-10-CM

## 2024-10-22 LAB
INTERPRETATION: NORMAL
INTERPRETATION: NORMAL
LAB PDF RESULT: NORMAL
LAB PDF RESULT: NORMAL
LOCATION OF TASK: NORMAL
LOCATION OF TASK: NORMAL
SIGNIFICANT RESULTS: NORMAL
SIGNIFICANT RESULTS: NORMAL
SPECIMEN DESCRIPTION: NORMAL
SPECIMEN DESCRIPTION: NORMAL
TEST DETAILS, MDL: NORMAL
TEST DETAILS, MDL: NORMAL

## 2024-10-23 LAB — INTERPRETATION: NORMAL

## 2024-10-24 ENCOUNTER — OFFICE VISIT (OUTPATIENT)
Dept: GASTROENTEROLOGY | Facility: CLINIC | Age: 34
End: 2024-10-24
Payer: COMMERCIAL

## 2024-10-24 ENCOUNTER — TELEPHONE (OUTPATIENT)
Dept: LAB | Facility: CLINIC | Age: 34
End: 2024-10-24

## 2024-10-24 VITALS
OXYGEN SATURATION: 98 % | DIASTOLIC BLOOD PRESSURE: 83 MMHG | WEIGHT: 218 LBS | HEART RATE: 78 BPM | BODY MASS INDEX: 32.29 KG/M2 | HEIGHT: 69 IN | SYSTOLIC BLOOD PRESSURE: 126 MMHG

## 2024-10-24 DIAGNOSIS — K52.9 CHRONIC DIARRHEA: ICD-10-CM

## 2024-10-24 DIAGNOSIS — K92.1 HEMATOCHEZIA: ICD-10-CM

## 2024-10-24 DIAGNOSIS — Z86.0100 HISTORY OF COLONIC POLYPS: Primary | ICD-10-CM

## 2024-10-24 PROCEDURE — 99213 OFFICE O/P EST LOW 20 MIN: CPT | Performed by: PHYSICIAN ASSISTANT

## 2024-10-24 RX ORDER — GABAPENTIN 100 MG/1
CAPSULE ORAL
COMMUNITY
Start: 2024-08-21

## 2024-10-24 ASSESSMENT — PAIN SCALES - GENERAL: PAINLEVEL_OUTOF10: NO PAIN (0)

## 2024-10-24 NOTE — PROGRESS NOTES
Notified Ct that no prior authorization is required for genetic testing. But she meets NCCN medical necessity guidelines and criteria. Explained there's no option to guarantee coverage of testing upfront by insurance.    Explained that insurance benefits may still apply, therefore, there could be an out of pocket cost. However, Ct was aware that insurance may not cover the cost of testing and would be responsible for the billed amount.     Ct expressed understanding and stated that she wants to proceed with testing. We will call when results are available. Ct had no further questions. [facility] Hold For Preauthorization: [[id number]] order was placed by a genetics provider.    Manju Ibarra  Genomics Billing    [facility]   Molecular Diagnostics Laboratory  [address] [place] [id number]  [phone]

## 2024-10-24 NOTE — PROGRESS NOTES
FOLLOW UP GI CLINIC VISIT    CC/REFERRING MD:  Referred Self  REASON FOR CONSULTATION:   Referred Self for   Chief Complaint   Patient presents with    Follow Up     3 month follow up for chronic diarrhea, hematochezia and fecal incontinence.  Post colonoscopy 10/09/2024.       ASSESSMENT/PLAN: Patient is here for follow-up after recent colonoscopy.  We pursued this colonoscopy because she was having loose stools and intermittent hematochezia.  Her colonoscopy has shown evidence of terminal ileitis along with over 30 colon polyps, all of which have been shown to be either tubular adenomas or sessile serrated adenomas.  Biopsies from the terminal ileum show focal active colitis without evidence of chronicity.  We spent some time reviewing all of this in detail.  Interestingly, her GI distress symptoms have largely resolved, now passing formed stool without any pain, bloating, or hematochezia.  It is unclear if the findings in the terminal ileum truly represent a Crohn's disease.  At the moment, she is not having any distressing symptoms and this may be very mild/subclinical.  For now, I do not recommend any specific treatment.  Regarding the colon polyps, she has received genetic counseling and is in the process of completing genetic screening.  We will keep an eye out for the report on this.  We are tentatively planning repeating colonoscopy in 1 year, but will also take into account the findings from the genetic screening.  Lastly, if she does develop recurrence of loose stools or other digestive distress symptoms, we may consider repeating colonoscopy sooner or considering cross-sectional imaging to evaluate the small bowel further.  Patient stated understanding of plan and we will be in touch after her genetic screening report is available.    1. History of colonic polyps (Primary)    2. Chronic diarrhea    3. Hematochezia    Thank you for this consultation.  It was a pleasure to participate in the care of this  patient; please contact us with any further questions.      15 minutes spent on the date of the encounter doing chart review, patient visit, and documentation    This note was created with voice recognition software, and while reviewed for accuracy, typos may remain.     Fabián Hurtado PA-C  Division of Gastroenterology, Hepatology and Nutrition  [facility]  Sophie Felton is a 34 year old female that is seen for follow up in the GI clinic today.  To review, I initially met with patient just about 3 months ago for frequent loose stools.  Prior to seeing me, she had seen my partner, Denys Boo PA-C, underwent workup that included laboratory testing to rule out infectious and inflammatory diarrhea.  She had mildly elevated CRP but normal fecal calprotectin, normal infectious stool studies, and negative celiac serologies.  Abdominal x-ray did not show any evidence of obstruction or significant stool burden.  She started fiber and found it mildly helpful, but was continuing to have loose stools as well as intermittent hematochezia.  Because of this, we sent her for a colonoscopy.  Interestingly, this showed evidence of inflammation in the terminal ileum as well as numerous polyps, including at advanced polyp.  Biopsies revealed these polyps to be sessile serrated adenomas, tubular adenomas.  She subsequently was referred to genetic counselor and is undergoing genetic testing to evaluate for hereditary polyposis syndrome and other general cancer syndromes.  She has been advised to follow-up in 1 year for repeat colonoscopy.    Interestingly, her symptoms have changed, she is no longer having loose stool.  She is having formed stool and occasionally having constipation.  Has not seen any further blood in the stool.  Not having any abdominal pain, cramping, or bloating.  Was not on NSAIDs around the time of the colonoscopy.      ROS:    10 point ROS neg other than the  symptoms noted above in the HPI.    PREVIOUS ENDOSCOPY:  Reviewed, see HPI    PERTINENT RELEVANT IMAGING OR LABS:  Reviewed    ALLERGIES:     Allergies   Allergen Reactions    Blood Transfusion Related (Informational Only) Other (See Comments)     Patient has a history of a clinically significant antibody against RBC antigens.  A delay in compatible RBCs may occur.    Septra [Sulfamethoxazole-Trimethoprim]     Sulfamethoxazole-Trimethoprim Hives     As a child       PERTINENT MEDICATIONS:    Current Outpatient Medications:     albuterol (PROAIR HFA/PROVENTIL HFA/VENTOLIN HFA) 108 (90 Base) MCG/ACT inhaler, every 4 hours as needed for shortness of breath., Disp: , Rfl:     ammonium lactate (LAC-HYDRIN) 12 % lotion, Apply topically 2 times daily as needed for dry skin, Disp: 360 g, Rfl: 3    busPIRone HCl (BUSPAR) 30 MG tablet, Take 30 mg by mouth 2 times daily, Disp: , Rfl:     escitalopram (LEXAPRO) 20 MG tablet, TAKE 1 TABLET BY MOUTH EVERY DAY, Disp: 90 tablet, Rfl: 0    fluocinolone acetonide oil 0.01 % ear drops, Place 0.25 mLs (5 drops) in ear(s) every other day, Disp: 20 mL, Rfl: 4    gabapentin (NEURONTIN) 100 MG capsule, take 1 capsule by mouth three times a day as needed, Disp: , Rfl:     loratadine (CLARITIN) 10 MG tablet, Take 20 mg by mouth daily, Disp: , Rfl:     risperiDONE (RISPERDAL) 0.25 MG tablet, TAKE 1 TABLET BY MOUTH TWICE DAILY AS NEEDED FOR ANXIETY, AGITATION, OR SLEEP, Disp: , Rfl:     PROBLEM LIST  Patient Active Problem List    Diagnosis Date Noted    Cervical cancer screening 2019     Priority: Medium     8649-4783: NIL Pap's (care everywhere)       (normal spontaneous vaginal delivery) 2019     Priority: Medium    39 weeks gestation of pregnancy 2019     Priority: Medium    Seasonal allergic rhinitis 2018     Priority: Medium    Scalp psoriasis 2018     Priority: Medium    Keratosis pilaris 2018     Priority: Medium    Mild major depression (H)  07/12/2018     Priority: Medium    Family history of colon cancer 07/12/2018     Priority: Medium    Supervision of normal pregnancy 04/05/2017     Priority: Medium       PERTINENT PAST MEDICAL HISTORY:  Past Medical History:   Diagnosis Date    Depressive disorder Age 15    Anxiety and depression has worsened since swcond birth    Family history of colon cancer 07/12/2018       PREVIOUS SURGERIES:  Past Surgical History:   Procedure Laterality Date    COLONOSCOPY N/A 10/9/2024    Procedure: COLONOSCOPY, FLEXIBLE, WITH LESION REMOVAL USING SNARE;  Surgeon: Ronald Cazares MD;  Location: [facility] OR    COLONOSCOPY N/A 10/9/2024    Procedure: COLONOSCOPY, WITH POLYPECTOMY AND BIOPSY;  Surgeon: Ronald Cazares MD;  Location: [facility] OR    COLONOSCOPY WITH CO2 INSUFFLATION N/A 10/9/2024    Procedure: Colonoscopy with CO2 insufflation;  Surgeon: Ronald Cazares MD;  Location: [facility] OR    EYE SURGERY  2010    LASIK       SOCIAL HISTORY:  Social History     Socioeconomic History    Marital status:      Spouse name: Not on file    Number of children: 2    Years of education: Not on file    Highest education level: Not on file   Occupational History    Occupation: Nurse   Tobacco Use    Smoking status: Never    Smokeless tobacco: Never   Vaping Use    Vaping status: Never Used   Substance and Sexual Activity    Alcohol use: No     Comment: once a week    Drug use: No    Sexual activity: Yes     Partners: Male     Birth control/protection: Male Surgical     Comment: Vasectomy   Other Topics Concern    Parent/sibling w/ CABG, MI or angioplasty before 65F 55M? No   Social History Narrative    Not on file     Social Drivers of Health     Financial Resource Strain: Low Risk  (3/19/2024)    Financial Resource Strain     Within the past 12 months, have you or your family members you live with been unable to get utilities (heat, electricity) when it was really needed?: No   Food Insecurity: High Risk  (3/19/2024)    Food Insecurity     Within the past 12 months, did you worry that your food would run out before you got money to buy more?: Yes     Within the past 12 months, did the food you bought just not last and you didn t have money to get more?: No   Transportation Needs: Low Risk  (3/19/2024)    Transportation Needs     Within the past 12 months, has lack of transportation kept you from medical appointments, getting your medicines, non-medical meetings or appointments, work, or from getting things that you need?: No   Physical Activity: Insufficiently Active (3/19/2024)    Exercise Vital Sign     Days of Exercise per Week: 2 days     Minutes of Exercise per Session: 20 min   Stress: Stress Concern Present (3/19/2024)    [place] [employer] - Occupational Stress Questionnaire     Feeling of Stress : To some extent   Social Connections: Unknown (3/19/2024)    Social Connection and Isolation Panel [NHANES]     Frequency of Communication with Friends and Family: Not on file     Frequency of Social Gatherings with Friends and Family: Twice a week     Attends Pentecostal Services: Not on file     Active Member of Clubs or Organizations: Not on file     Attends Club or Organization Meetings: Not on file     Marital Status: Not on file   Interpersonal Safety: Low Risk  (3/20/2024)    Interpersonal Safety     Do you feel physically and emotionally safe where you currently live?: Yes     Within the past 12 months, have you been hit, slapped, kicked or otherwise physically hurt by someone?: No     Within the past 12 months, have you been humiliated or emotionally abused in other ways by your partner or ex-partner?: No   Housing Stability: Low Risk  (3/19/2024)    Housing Stability     Do you have housing? : Yes     Are you worried about losing your housing?: No       FAMILY HISTORY:  Family History   Problem Relation Age of Onset    Diabetes Brother     Substance Abuse Brother     Thyroid Disease  "Brother     Colon Cancer Father         age 62    Thyroid Disease Maternal Grandmother         Also had Alzheimer s    Prostate Cancer Maternal Grandfather         Also had colon cancer       Past/family/social history reviewed and no changes    PHYSICAL EXAMINATION:  Constitutional: aaox3, cooperative, pleasant, not dyspneic/diaphoretic, no acute distress  Vitals reviewed: /83 (BP Location: Left arm, Patient Position: Sitting, Cuff Size: Adult Large)   Pulse 78   Ht 1.74 m (5' 8.5\")   Wt 98.9 kg (218 lb)   LMP 10/03/2024 (Approximate)   SpO2 98%   BMI 32.66 kg/m    Wt:   Wt Readings from Last 2 Encounters:   10/24/24 98.9 kg (218 lb)   07/30/24 97.5 kg (215 lb)      Eyes: Sclera anicteric/injected  CV: No edema  Respiratory: Unlabored breathing  Abd: Nondistended, +bs, no hepatosplenomegaly, nontender, no peritoneal signs  Skin: warm, perfused, no jaundice  Psych: Normal affect  MSK: Normal gait                    "

## 2024-10-24 NOTE — LETTER
10/24/2024      Sophie Felton  [address] [address]      Dear Colleague,    Thank you for referring your patient, Sophie Felton, to the [facility]. Please see a copy of my visit note below.    FOLLOW UP GI CLINIC VISIT    CC/REFERRING MD:  Referred Self  REASON FOR CONSULTATION:   Referred Self for   Chief Complaint   Patient presents with     Follow Up     3 month follow up for chronic diarrhea, hematochezia and fecal incontinence.  Post colonoscopy 10/09/2024.       ASSESSMENT/PLAN: Patient is here for follow-up after recent colonoscopy.  We pursued this colonoscopy because she was having loose stools and intermittent hematochezia.  Her colonoscopy has shown evidence of terminal ileitis along with over 30 colon polyps, all of which have been shown to be either tubular adenomas or sessile serrated adenomas.  Biopsies from the terminal ileum show focal active colitis without evidence of chronicity.  We spent some time reviewing all of this in detail.  Interestingly, her GI distress symptoms have largely resolved, now passing formed stool without any pain, bloating, or hematochezia.  It is unclear if the findings in the terminal ileum truly represent a Crohn's disease.  At the moment, she is not having any distressing symptoms and this may be very mild/subclinical.  For now, I do not recommend any specific treatment.  Regarding the colon polyps, she has received genetic counseling and is in the process of completing genetic screening.  We will keep an eye out for the report on this.  We are tentatively planning repeating colonoscopy in 1 year, but will also take into account the findings from the genetic screening.  Lastly, if she does develop recurrence of loose stools or other digestive distress symptoms, we may consider repeating colonoscopy sooner or considering cross-sectional imaging to evaluate the small bowel further.  Patient stated understanding of plan  and we will be in touch after her genetic screening report is available.    1. History of colonic polyps (Primary)    2. Chronic diarrhea    3. Hematochezia    Thank you for this consultation.  It was a pleasure to participate in the care of this patient; please contact us with any further questions.      15 minutes spent on the date of the encounter doing chart review, patient visit, and documentation    This note was created with voice recognition software, and while reviewed for accuracy, typos may remain.     Fabián Hurtado PA-C  Division of Gastroenterology, Hepatology and Nutrition  [facility]  Sophie Felton is a 34 year old female that is seen for follow up in the GI clinic today.  To review, I initially met with patient just about 3 months ago for frequent loose stools.  Prior to seeing me, she had seen my partner, Denys Boo PA-C, underwent workup that included laboratory testing to rule out infectious and inflammatory diarrhea.  She had mildly elevated CRP but normal fecal calprotectin, normal infectious stool studies, and negative celiac serologies.  Abdominal x-ray did not show any evidence of obstruction or significant stool burden.  She started fiber and found it mildly helpful, but was continuing to have loose stools as well as intermittent hematochezia.  Because of this, we sent her for a colonoscopy.  Interestingly, this showed evidence of inflammation in the terminal ileum as well as numerous polyps, including at advanced polyp.  Biopsies revealed these polyps to be sessile serrated adenomas, tubular adenomas.  She subsequently was referred to genetic counselor and is undergoing genetic testing to evaluate for hereditary polyposis syndrome and other general cancer syndromes.  She has been advised to follow-up in 1 year for repeat colonoscopy.    Interestingly, her symptoms have changed, she is no longer having loose stool.  She is having  formed stool and occasionally having constipation.  Has not seen any further blood in the stool.  Not having any abdominal pain, cramping, or bloating.  Was not on NSAIDs around the time of the colonoscopy.      ROS:    10 point ROS neg other than the symptoms noted above in the HPI.    PREVIOUS ENDOSCOPY:  Reviewed, see HPI    PERTINENT RELEVANT IMAGING OR LABS:  Reviewed    ALLERGIES:     Allergies   Allergen Reactions     Blood Transfusion Related (Informational Only) Other (See Comments)     Patient has a history of a clinically significant antibody against RBC antigens.  A delay in compatible RBCs may occur.     Septra [Sulfamethoxazole-Trimethoprim]      Sulfamethoxazole-Trimethoprim Hives     As a child       PERTINENT MEDICATIONS:    Current Outpatient Medications:      albuterol (PROAIR HFA/PROVENTIL HFA/VENTOLIN HFA) 108 (90 Base) MCG/ACT inhaler, every 4 hours as needed for shortness of breath., Disp: , Rfl:      ammonium lactate (LAC-HYDRIN) 12 % lotion, Apply topically 2 times daily as needed for dry skin, Disp: 360 g, Rfl: 3     busPIRone HCl (BUSPAR) 30 MG tablet, Take 30 mg by mouth 2 times daily, Disp: , Rfl:      escitalopram (LEXAPRO) 20 MG tablet, TAKE 1 TABLET BY MOUTH EVERY DAY, Disp: 90 tablet, Rfl: 0     fluocinolone acetonide oil 0.01 % ear drops, Place 0.25 mLs (5 drops) in ear(s) every other day, Disp: 20 mL, Rfl: 4     gabapentin (NEURONTIN) 100 MG capsule, take 1 capsule by mouth three times a day as needed, Disp: , Rfl:      loratadine (CLARITIN) 10 MG tablet, Take 20 mg by mouth daily, Disp: , Rfl:      risperiDONE (RISPERDAL) 0.25 MG tablet, TAKE 1 TABLET BY MOUTH TWICE DAILY AS NEEDED FOR ANXIETY, AGITATION, OR SLEEP, Disp: , Rfl:     PROBLEM LIST  Patient Active Problem List    Diagnosis Date Noted     Cervical cancer screening 2019     Priority: Medium     7840-5692: NIL Pap's (care everywhere)        (normal spontaneous vaginal delivery) 2019     Priority: Medium      39 weeks gestation of pregnancy 04/17/2019     Priority: Medium     Seasonal allergic rhinitis 07/12/2018     Priority: Medium     Scalp psoriasis 07/12/2018     Priority: Medium     Keratosis pilaris 07/12/2018     Priority: Medium     Mild major depression (H) 07/12/2018     Priority: Medium     Family history of colon cancer 07/12/2018     Priority: Medium     Supervision of normal pregnancy 04/05/2017     Priority: Medium       PERTINENT PAST MEDICAL HISTORY:  Past Medical History:   Diagnosis Date     Depressive disorder Age 15    Anxiety and depression has worsened since swcond birth     Family history of colon cancer 07/12/2018       PREVIOUS SURGERIES:  Past Surgical History:   Procedure Laterality Date     COLONOSCOPY N/A 10/9/2024    Procedure: COLONOSCOPY, FLEXIBLE, WITH LESION REMOVAL USING SNARE;  Surgeon: Ronald Cazares MD;  Location: [facility] OR     COLONOSCOPY N/A 10/9/2024    Procedure: COLONOSCOPY, WITH POLYPECTOMY AND BIOPSY;  Surgeon: Ronald Cazares MD;  Location: [facility] OR     COLONOSCOPY WITH CO2 INSUFFLATION N/A 10/9/2024    Procedure: Colonoscopy with CO2 insufflation;  Surgeon: Ronald Cazares MD;  Location: [facility] OR     EYE SURGERY  2010    LASIK       SOCIAL HISTORY:  Social History     Socioeconomic History     Marital status:      Spouse name: Not on file     Number of children: 2     Years of education: Not on file     Highest education level: Not on file   Occupational History     Occupation: Nurse   Tobacco Use     Smoking status: Never     Smokeless tobacco: Never   Vaping Use     Vaping status: Never Used   Substance and Sexual Activity     Alcohol use: No     Comment: once a week     Drug use: No     Sexual activity: Yes     Partners: Male     Birth control/protection: Male Surgical     Comment: Vasectomy   Other Topics Concern     Parent/sibling w/ CABG, MI or angioplasty before 65F 55M? No   Social History Narrative     Not on file     Social  Drivers of Health     Financial Resource Strain: Low Risk  (3/19/2024)    Financial Resource Strain      Within the past 12 months, have you or your family members you live with been unable to get utilities (heat, electricity) when it was really needed?: No   Food Insecurity: High Risk (3/19/2024)    Food Insecurity      Within the past 12 months, did you worry that your food would run out before you got money to buy more?: Yes      Within the past 12 months, did the food you bought just not last and you didn t have money to get more?: No   Transportation Needs: Low Risk  (3/19/2024)    Transportation Needs      Within the past 12 months, has lack of transportation kept you from medical appointments, getting your medicines, non-medical meetings or appointments, work, or from getting things that you need?: No   Physical Activity: Insufficiently Active (3/19/2024)    Exercise Vital Sign      Days of Exercise per Week: 2 days      Minutes of Exercise per Session: 20 min   Stress: Stress Concern Present (3/19/2024)    [employer] - Occupational Stress Questionnaire      Feeling of Stress : To some extent   Social Connections: Unknown (3/19/2024)    Social Connection and Isolation Panel [NHANES]      Frequency of Communication with Friends and Family: Not on file      Frequency of Social Gatherings with Friends and Family: Twice a week      Attends Christian Services: Not on file      Active Member of Clubs or Organizations: Not on file      Attends Club or Organization Meetings: Not on file      Marital Status: Not on file   Interpersonal Safety: Low Risk  (3/20/2024)    Interpersonal Safety      Do you feel physically and emotionally safe where you currently live?: Yes      Within the past 12 months, have you been hit, slapped, kicked or otherwise physically hurt by someone?: No      Within the past 12 months, have you been humiliated or emotionally abused in other ways by your partner or  "ex-partner?: No   Housing Stability: Low Risk  (3/19/2024)    Housing Stability      Do you have housing? : Yes      Are you worried about losing your housing?: No       FAMILY HISTORY:  Family History   Problem Relation Age of Onset     Diabetes Brother      Substance Abuse Brother      Thyroid Disease Brother      Colon Cancer Father         age 62     Thyroid Disease Maternal Grandmother         Also had Alzheimer s     Prostate Cancer Maternal Grandfather         Also had colon cancer       Past/family/social history reviewed and no changes    PHYSICAL EXAMINATION:  Constitutional: aaox3, cooperative, pleasant, not dyspneic/diaphoretic, no acute distress  Vitals reviewed: /83 (BP Location: Left arm, Patient Position: Sitting, Cuff Size: Adult Large)   Pulse 78   Ht 1.74 m (5' 8.5\")   Wt 98.9 kg (218 lb)   LMP 10/03/2024 (Approximate)   SpO2 98%   BMI 32.66 kg/m    Wt:   Wt Readings from Last 2 Encounters:   10/24/24 98.9 kg (218 lb)   07/30/24 97.5 kg (215 lb)      Eyes: Sclera anicteric/injected  CV: No edema  Respiratory: Unlabored breathing  Abd: Nondistended, +bs, no hepatosplenomegaly, nontender, no peritoneal signs  Skin: warm, perfused, no jaundice  Psych: Normal affect  MSK: Normal gait                      Again, thank you for allowing me to participate in the care of your patient.        Sincerely,        Fabián Hurtado PA-C  "

## 2024-10-24 NOTE — NURSING NOTE
"Chief Complaint   Patient presents with    Follow Up     3 month follow up for chronic diarrhea, hematochezia and fecal incontinence.  Post colonoscopy 10/09/2024.     She requests these members of her care team be copied on today's visit information:  PCP: Giulia Godoy    Vitals:    10/24/24 1523   BP: 126/83   BP Location: Left arm   Patient Position: Sitting   Cuff Size: Adult Large   Pulse: 78   SpO2: 98%   Weight: 98.9 kg (218 lb)   Height: 1.74 m (5' 8.5\")     Body mass index is 32.66 kg/m .    Medications were reconciled.    Does patient need any medication refills at today's visit? No        Kaylee Contreras CMA    "

## 2024-10-25 ENCOUNTER — LAB (OUTPATIENT)
Dept: LAB | Facility: CLINIC | Age: 34
End: 2024-10-25
Payer: COMMERCIAL

## 2024-10-25 DIAGNOSIS — Z80.0 FAMILY HISTORY OF COLON CANCER: ICD-10-CM

## 2024-10-25 DIAGNOSIS — K63.5 COLON POLYPOSIS: Primary | ICD-10-CM

## 2024-10-25 DIAGNOSIS — Z80.42 FAMILY HISTORY OF PROSTATE CANCER: ICD-10-CM

## 2024-10-25 PROCEDURE — G0452 MOLECULAR PATHOLOGY INTERPR: HCPCS | Mod: 26 | Performed by: STUDENT IN AN ORGANIZED HEALTH CARE EDUCATION/TRAINING PROGRAM

## 2024-11-04 DIAGNOSIS — K63.5 COLON POLYPOSIS: Primary | ICD-10-CM

## 2024-11-04 DIAGNOSIS — Z80.42 FAMILY HISTORY OF PROSTATE CANCER: ICD-10-CM

## 2024-11-04 DIAGNOSIS — Z80.0 FAMILY HISTORY OF COLON CANCER: ICD-10-CM

## 2024-11-26 ENCOUNTER — VIRTUAL VISIT (OUTPATIENT)
Dept: ONCOLOGY | Facility: CLINIC | Age: 34
End: 2024-11-26
Attending: GENETIC COUNSELOR, MS
Payer: COMMERCIAL

## 2024-11-26 DIAGNOSIS — Z80.42 FAMILY HISTORY OF PROSTATE CANCER: ICD-10-CM

## 2024-11-26 DIAGNOSIS — K63.5 COLON POLYPOSIS: ICD-10-CM

## 2024-11-26 DIAGNOSIS — Z80.0 FAMILY HISTORY OF COLON CANCER: ICD-10-CM

## 2024-11-26 PROCEDURE — 96040 HC GENETIC COUNSELING, EACH 30 MINUTES: CPT | Mod: GT,95 | Performed by: GENETIC COUNSELOR, MS

## 2024-11-26 NOTE — PROGRESS NOTES
"11/26/2024    Virtual Visit Details  Type of service:  Video Visit   Originating Location (pt. Location): Home  Distant Location (provider location):  Off-site  Platform used for Video Visit: Rena  Length of video visit: 18 minutes    Referring Provider: Ronald Cazares MD    Presenting Information:  I spoke to Ct by video today to discuss her genetic testing results. We last met on 10/15/2024 and her blood was drawn on 10/22/2024. The Comprehensive Hereditary Cancer Panel + RNF43 Gene was ordered from the [facility]. This testing was done because of Ct's personal history of colon polyps and family history of cancer.    Genetic Testing Result: NEGATIVE  Ct is negative for mutations in the RNF43, APC, JAYESH, AXIN2, BAP1, BARD1, BMPR1A, BRCA1, BRCA2, BRIP1, CDH1, CDK4, CDKN2A, CHEK2, DICER1, EPCAM, GREM1, HOXB13, MBD4, MITF, MLH1, MLH3, MRE11, MSH2, MSH3, MSH6, MUTYH, NBN, NF1, NTHL1, PALB2, PMS2, POLD1, POLE, POT1, PTEN, RAD50, RAD51C, RAD51D, SMAD4, SMARCA4, STK11, TERT, and TP53 genes.      No mutations were found in any of the 44 genes analyzed. This test involved sequencing and deletion/duplication analysis of all genes with the exceptions of EPCAM and GREM1 (deletions/duplications only).      A copy of the test report can be found in the Laboratory tab, dated 10/25/2024, and named \"Next generation sequencing\".     Interpretation:  We discussed several different interpretations of this negative test result.    One explanation may be that there is a different gene or combination of genes and environment that are associated with the cancers/polyps in this family that are not identifiable using this genetic test. As such, Ct is encouraged to contact me regularly to review any new genetic testing options that may be appropriate for her.  Another explanation may be that her other relatives with cancer did have a mutation in one of these 44 genes and Ct " did not inherit it. If that is the case, Ct's colon polyps may be sporadic and caused by random cellular changes.  There is also a small possibility that there is a mutation in one of these genes, and the testing laboratory could not find it with their current testing methods.       Cancer Screening:  Based on this negative test result, it is important for Ct and her relatives to refer back to the family history for appropriate cancer screening.    We discussed that her history of colon polyps likely meets the current National Comprehensive Cancer Network (NCCN) criteria for a diagnosis of serrated polyposis syndrome (SPS), which is a diagnosis made based on the number, size, and type of colon polyps removed. Specifically, Ct has had more than 20 serrated lesions removed (both serrated adenomas and hyperplastic polyps).  We discussed that individuals with SPS are considered at higher risk for colon cancer, though the exact lifetime risk is currently uncertain. It is also uncertain if individuals with SPS are at increased risk for any other types of cancer.  If Ct's gastroenterologist determines that this is the correct diagnosis for Ct, it would be recommended that she continue to have colonoscopies every 1-3 years. The exact frequency will depend on the size and number of polyps removed during each exam.   We also briefly discussed that though not common with SPS, there are rarely individuals that may develop too many polyps to be adequately removed with colonoscopy alone. If that is the case, surgery may be considered.  We then discussed that typically individuals with SPS are the only members of their family with that diagnosis. Rarely, though, there are families with more than one individual with SPS. As such, all of Ct's first-degree relatives (mother, siblings, children) are recommended to have a colonoscopy at age 35 (same age as the onset of Ct's polyps) and repeated every 5 years  if no polyps are identified. If serrated polyps and/or multiple adenomas are identified, colonoscopies may then be recommended every 1-3 years.     Other population cancer screening options, such as those recommended by the American Cancer Society and NCCN, are also appropriate for Ct and her family. These screening recommendations may change if there are changes to Ct's personal and/or family history of cancer. Final screening recommendations should be made in consultation with each individual's primary care provider.      Inheritance:  We reviewed the autosomal dominant inheritance of mutations in these 44 genes. We discussed that Ct did not pass on an identifiable mutation in these genes to her children based on this test result. Mutations in these genes do not skip generations.      Additional Testing Considerations:  As Ct's genetic testing result was negative, it is unlikely other relatives carry a currently identifiable gene mutation associated with hereditary cancer. That being said, if any of her relatives do pursue genetic testing, Ct is encouraged to contact me so that we may review the impact of their test results on her.    Summary:  We do not have an explanation for Ct's personal history of polyps or family history of cancer. While no genetic changes were identified, Ct may still be at risk for certain cancers due to family history, environmental factors, or other genetic causes not identified by this test. Because of that, it is important that she continue with cancer screening based on her personal and family history as discussed above.    Genetic testing is rapidly advancing, and new cancer susceptibility genes will most likely be identified in the future. Therefore, I encouraged Ct to contact me regularly or if there are changes in her personal or family history. This may change how we assess her cancer risk, screening, and the testing we would offer.    Plan:  1.  Ct was provided a copy of her test results via [employer].  2. She plans to follow-up with her medical providers as discussed above.  3. She should contact me periodically, or if her personal or family history of cancer changes.    Celia oRe MS, [place]  Licensed, Certified Genetic Counselor  Office: [phone]  [email or web address]

## 2024-11-26 NOTE — LETTER
"11/26/2024      Sophie Felton  [address] [place] [id number]      Dear Colleague,    Thank you for referring your patient, Sophie Felton, to the [facility]. Please see a copy of my visit note below.    11/26/2024    Virtual Visit Details  Type of service:  Video Visit   Originating Location (pt. Location): Home  Distant Location (provider location):  Off-site  Platform used for Video Visit: Zuvvu  Length of video visit: 18 minutes    Referring Provider: Ronald Cazares MD    Presenting Information:  I spoke to Ct by video today to discuss her genetic testing results. We last met on 10/15/2024 and her blood was drawn on 10/22/2024. The Comprehensive Hereditary Cancer Panel + RNF43 Gene was ordered from the [facility]. This testing was done because of Ct's personal history of colon polyps and family history of cancer.    Genetic Testing Result: NEGATIVE  Ct is negative for mutations in the RNF43, APC, JAYESH, AXIN2, BAP1, BARD1, BMPR1A, BRCA1, BRCA2, BRIP1, CDH1, CDK4, CDKN2A, CHEK2, DICER1, EPCAM, GREM1, HOXB13, MBD4, MITF, MLH1, MLH3, MRE11, MSH2, MSH3, MSH6, MUTYH, NBN, NF1, NTHL1, PALB2, PMS2, POLD1, POLE, POT1, PTEN, RAD50, RAD51C, RAD51D, SMAD4, SMARCA4, STK11, TERT, and TP53 genes.      No mutations were found in any of the 44 genes analyzed. This test involved sequencing and deletion/duplication analysis of all genes with the exceptions of EPCAM and GREM1 (deletions/duplications only).      A copy of the test report can be found in the Laboratory tab, dated 10/25/2024, and named \"Next generation sequencing\".     Interpretation:  We discussed several different interpretations of this negative test result.    One explanation may be that there is a different gene or combination of genes and environment that are associated with the cancers/polyps in this family that are not identifiable using this genetic test. " As such, Ct is encouraged to contact me regularly to review any new genetic testing options that may be appropriate for her.  Another explanation may be that her other relatives with cancer did have a mutation in one of these 44 genes and Ct did not inherit it. If that is the case, Ct's colon polyps may be sporadic and caused by random cellular changes.  There is also a small possibility that there is a mutation in one of these genes, and the testing laboratory could not find it with their current testing methods.       Cancer Screening:  Based on this negative test result, it is important for Ct and her relatives to refer back to the family history for appropriate cancer screening.    We discussed that her history of colon polyps likely meets the current National Comprehensive Cancer Network (NCCN) criteria for a diagnosis of serrated polyposis syndrome (SPS), which is a diagnosis made based on the number, size, and type of colon polyps removed. Specifically, Ct has had more than 20 serrated lesions removed (both serrated adenomas and hyperplastic polyps).  We discussed that individuals with SPS are considered at higher risk for colon cancer, though the exact lifetime risk is currently uncertain. It is also uncertain if individuals with SPS are at increased risk for any other types of cancer.  If Ct's gastroenterologist determines that this is the correct diagnosis for Ct, it would be recommended that she continue to have colonoscopies every 1-3 years. The exact frequency will depend on the size and number of polyps removed during each exam.   We also briefly discussed that though not common with SPS, there are rarely individuals that may develop too many polyps to be adequately removed with colonoscopy alone. If that is the case, surgery may be considered.  We then discussed that typically individuals with SPS are the only members of their family with that diagnosis. Rarely, though,  there are families with more than one individual with SPS. As such, all of Ct's first-degree relatives (mother, siblings, children) are recommended to have a colonoscopy at age 35 (same age as the onset of Ct's polyps) and repeated every 5 years if no polyps are identified. If serrated polyps and/or multiple adenomas are identified, colonoscopies may then be recommended every 1-3 years.     Other population cancer screening options, such as those recommended by the American Cancer Society and NCCN, are also appropriate for Ct and her family. These screening recommendations may change if there are changes to Ct's personal and/or family history of cancer. Final screening recommendations should be made in consultation with each individual's primary care provider.      Inheritance:  We reviewed the autosomal dominant inheritance of mutations in these 44 genes. We discussed that Ct did not pass on an identifiable mutation in these genes to her children based on this test result. Mutations in these genes do not skip generations.      Additional Testing Considerations:  As Ct's genetic testing result was negative, it is unlikely other relatives carry a currently identifiable gene mutation associated with hereditary cancer. That being said, if any of her relatives do pursue genetic testing, Ct is encouraged to contact me so that we may review the impact of their test results on her.    Summary:  We do not have an explanation for Ct's personal history of polyps or family history of cancer. While no genetic changes were identified, Ct may still be at risk for certain cancers due to family history, environmental factors, or other genetic causes not identified by this test. Because of that, it is important that she continue with cancer screening based on her personal and family history as discussed above.    Genetic testing is rapidly advancing, and new cancer susceptibility genes will most  likely be identified in the future. Therefore, I encouraged Ct to contact me regularly or if there are changes in her personal or family history. This may change how we assess her cancer risk, screening, and the testing we would offer.    Plan:  1. Ct was provided a copy of her test results via [employer].  2. She plans to follow-up with her medical providers as discussed above.  3. She should contact me periodically, or if her personal or family history of cancer changes.    Celia Roe MS, [facility]  Licensed, Certified Genetic Counselor  Office: [phone]  [email or web address]      Again, thank you for allowing me to participate in the care of your patient.        Sincerely,        Celia Roe,

## 2024-11-26 NOTE — NURSING NOTE
Current patient location: [address] [place] [id number]    Is the patient currently in the state of [place]? YES    Visit mode:VIDEO    If the visit is dropped, the patient can be reconnected by:VIDEO VISIT: Text to cell phone:   Telephone Information:   Mobile [phone]       Will anyone else be joining the visit? NO  (If patient encounters technical issues they should call [phone]    Are changes needed to the allergy or medication list? N/A    Are refills needed on medications prescribed by this physician? NO    Rooming Documentation:  Questionnaire(s) not done per department protocol    Reason for visit: RECHECK    Eri MENESESF

## 2024-11-27 NOTE — PATIENT INSTRUCTIONS
Negative Genetic Test Result    Genetic Testing  Genetic testing involved a blood or saliva test which looked at the genetic information in select genes for variants associated with cancer risk.  This testing may have included analysis of a single gene due to a known variant in the family, multiple genes most associated with the cancers in a family, or an expanded panel of genes related to many types of cancers.     Results  There are several possible genetic test results, including:   Positive--a harmful mutation (also known as a  pathogenic  or  likely pathogenic  variant) was identified in a gene associated with increased cancer risk.  These risks, as well as medical management options, depend on the specific genetic variant identified.    Negative--no variants were identified in the genes analyzed   Variant of unknown significance--a variant was identified in one or more genes, though it is currently unclear how this impacts cancer risk in the family.  Genetic testing labs are working to collect evidence about these uncertain variants and may provide updates in the future.    What Does a Negative Genetic Test Result Mean?  A negative genetic test results means that no genetic changes (variants) were detected in the genes tested. While no inherited risk factors for cancer were identified, you and your family may be at risk for certain cancers due to family history, environmental factors, or other genetic causes not identified by this test.  It is also possible that your family may still be at risk of carrying a genetic risk factor which you did not inherit. Your genetic counselor can help interpret the result for you and your relatives.      It is important to note which genes were included in your test. A list of these genes can be found on your test result.    Screening Recommendations  A combination of personal and family history factors may inform cancer risk and medical management recommendations.   Population cancer screening options, such as those recommended by the American Cancer Society and the National Comprehensive Cancer Network (NCCN) are appropriate for many families at average risk for cancer.  However, earlier and/or more frequent screening may be recommended based on personal factors (lifestyle, exposures, medications, screening results), family history of cancer, and sometimes genetic factors.  These cancer risk management options should be discussed in more detail with an individual's medical providers.      Please call us if you have any questions or concerns.   Cancer Risk Management Program (Appointments: [phone])  Kalia Rhoades, MS [facility]  [phone]  Elsie Benjamin, MS, [facility] [phone]  Hortencia Gallegos, MS, [facility]  [phone]  Heavenly Giron, MS, [facility]  [phone]  Yokasta Howe, MS, [facility]  [phone]  Celia Roe, MS, [facility] [phone]  Kelly Pierre, MS, [facility] [phone]

## 2025-04-26 ENCOUNTER — HEALTH MAINTENANCE LETTER (OUTPATIENT)
Age: 35
End: 2025-04-26

## 2025-06-05 ENCOUNTER — TRANSFERRED RECORDS (OUTPATIENT)
Dept: HEALTH INFORMATION MANAGEMENT | Facility: CLINIC | Age: 35
End: 2025-06-05
Payer: COMMERCIAL

## (undated) DEVICE — KIT ENDO FIRST STEP DISINFECTANT 200ML W/POUCH EP-4

## (undated) DEVICE — PAD CHUX UNDERPAD 23X24" 7136